# Patient Record
Sex: MALE | Race: WHITE | NOT HISPANIC OR LATINO
[De-identification: names, ages, dates, MRNs, and addresses within clinical notes are randomized per-mention and may not be internally consistent; named-entity substitution may affect disease eponyms.]

---

## 2021-07-30 ENCOUNTER — APPOINTMENT (OUTPATIENT)
Dept: OPHTHALMOLOGY | Facility: CLINIC | Age: 81
End: 2021-07-30
Payer: MEDICARE

## 2021-07-30 ENCOUNTER — NON-APPOINTMENT (OUTPATIENT)
Age: 81
End: 2021-07-30

## 2021-07-30 PROCEDURE — 92134 CPTRZ OPH DX IMG PST SGM RTA: CPT

## 2021-07-30 PROCEDURE — 92014 COMPRE OPH EXAM EST PT 1/>: CPT

## 2022-02-04 ENCOUNTER — APPOINTMENT (OUTPATIENT)
Dept: OPHTHALMOLOGY | Facility: CLINIC | Age: 82
End: 2022-02-04
Payer: MEDICARE

## 2022-02-04 ENCOUNTER — NON-APPOINTMENT (OUTPATIENT)
Age: 82
End: 2022-02-04

## 2022-02-04 PROCEDURE — 92014 COMPRE OPH EXAM EST PT 1/>: CPT

## 2022-02-04 PROCEDURE — 92133 CPTRZD OPH DX IMG PST SGM ON: CPT

## 2022-09-23 ENCOUNTER — APPOINTMENT (OUTPATIENT)
Dept: OPHTHALMOLOGY | Facility: CLINIC | Age: 82
End: 2022-09-23

## 2022-09-23 ENCOUNTER — NON-APPOINTMENT (OUTPATIENT)
Age: 82
End: 2022-09-23

## 2022-09-23 PROCEDURE — 92014 COMPRE OPH EXAM EST PT 1/>: CPT

## 2022-09-23 PROCEDURE — 92134 CPTRZ OPH DX IMG PST SGM RTA: CPT

## 2022-12-15 ENCOUNTER — TRANSCRIPTION ENCOUNTER (OUTPATIENT)
Age: 82
End: 2022-12-15

## 2022-12-15 PROBLEM — Z00.00 ENCOUNTER FOR PREVENTIVE HEALTH EXAMINATION: Status: ACTIVE | Noted: 2022-12-15

## 2022-12-19 ENCOUNTER — APPOINTMENT (OUTPATIENT)
Dept: CARE COORDINATION | Facility: HOME HEALTH | Age: 82
End: 2022-12-19
Payer: MEDICARE

## 2022-12-19 ENCOUNTER — TRANSCRIPTION ENCOUNTER (OUTPATIENT)
Age: 82
End: 2022-12-19

## 2022-12-19 DIAGNOSIS — I25.2 OLD MYOCARDIAL INFARCTION: ICD-10-CM

## 2022-12-19 DIAGNOSIS — Z95.5 PRESENCE OF CORONARY ANGIOPLASTY IMPLANT AND GRAFT: ICD-10-CM

## 2022-12-19 PROCEDURE — 99495 TRANSJ CARE MGMT MOD F2F 14D: CPT

## 2022-12-21 ENCOUNTER — NON-APPOINTMENT (OUTPATIENT)
Age: 82
End: 2022-12-21

## 2022-12-21 ENCOUNTER — APPOINTMENT (OUTPATIENT)
Dept: CARDIOLOGY | Facility: CLINIC | Age: 82
End: 2022-12-21

## 2022-12-21 VITALS
HEART RATE: 64 BPM | OXYGEN SATURATION: 99 % | SYSTOLIC BLOOD PRESSURE: 138 MMHG | DIASTOLIC BLOOD PRESSURE: 74 MMHG | RESPIRATION RATE: 15 BRPM

## 2022-12-21 VITALS
WEIGHT: 215 LBS | SYSTOLIC BLOOD PRESSURE: 118 MMHG | DIASTOLIC BLOOD PRESSURE: 66 MMHG | HEART RATE: 65 BPM | BODY MASS INDEX: 31.84 KG/M2 | HEIGHT: 69 IN | OXYGEN SATURATION: 100 %

## 2022-12-21 VITALS — DIASTOLIC BLOOD PRESSURE: 62 MMHG | SYSTOLIC BLOOD PRESSURE: 122 MMHG

## 2022-12-21 DIAGNOSIS — I10 ESSENTIAL (PRIMARY) HYPERTENSION: ICD-10-CM

## 2022-12-21 DIAGNOSIS — Z78.9 OTHER SPECIFIED HEALTH STATUS: ICD-10-CM

## 2022-12-21 DIAGNOSIS — Z85.828 PERSONAL HISTORY OF OTHER MALIGNANT NEOPLASM OF SKIN: ICD-10-CM

## 2022-12-21 DIAGNOSIS — Z79.899 OTHER LONG TERM (CURRENT) DRUG THERAPY: ICD-10-CM

## 2022-12-21 DIAGNOSIS — Z80.0 FAMILY HISTORY OF MALIGNANT NEOPLASM OF DIGESTIVE ORGANS: ICD-10-CM

## 2022-12-21 DIAGNOSIS — E78.00 PURE HYPERCHOLESTEROLEMIA, UNSPECIFIED: ICD-10-CM

## 2022-12-21 DIAGNOSIS — I25.10 ATHEROSCLEROTIC HEART DISEASE OF NATIVE CORONARY ARTERY W/OUT ANGINA PECTORIS: ICD-10-CM

## 2022-12-21 PROBLEM — Z95.5 H/O HEART ARTERY STENT: Status: ACTIVE | Noted: 2022-12-21

## 2022-12-21 PROBLEM — I25.2 H/O NON-ST ELEVATION MYOCARDIAL INFARCTION (NSTEMI): Status: RESOLVED | Noted: 2022-12-21 | Resolved: 2022-12-21

## 2022-12-21 PROCEDURE — 99204 OFFICE O/P NEW MOD 45 MIN: CPT

## 2022-12-21 PROCEDURE — 93000 ELECTROCARDIOGRAM COMPLETE: CPT

## 2022-12-21 RX ORDER — FAMOTIDINE 20 MG/1
20 TABLET, FILM COATED ORAL
Qty: 60 | Refills: 0 | Status: ACTIVE | COMMUNITY
Start: 2022-12-07

## 2022-12-21 RX ORDER — TAMSULOSIN HYDROCHLORIDE 0.4 MG/1
0.4 CAPSULE ORAL
Qty: 90 | Refills: 0 | Status: ACTIVE | COMMUNITY
Start: 2022-11-15

## 2022-12-21 RX ORDER — TICAGRELOR 90 MG/1
90 TABLET ORAL
Qty: 60 | Refills: 0 | Status: ACTIVE | COMMUNITY
Start: 2022-12-06

## 2022-12-21 RX ORDER — ATORVASTATIN CALCIUM 80 MG/1
80 TABLET, FILM COATED ORAL
Qty: 30 | Refills: 0 | Status: ACTIVE | COMMUNITY
Start: 2022-12-07

## 2022-12-21 RX ORDER — ASPIRIN 81 MG/1
81 TABLET, COATED ORAL
Qty: 30 | Refills: 0 | Status: ACTIVE | COMMUNITY
Start: 2022-12-07

## 2022-12-21 RX ORDER — AMLODIPINE BESYLATE 5 MG/1
5 TABLET ORAL
Qty: 60 | Refills: 0 | Status: ACTIVE | COMMUNITY
Start: 2022-12-07

## 2022-12-21 RX ORDER — OLMESARTAN MEDOXOMIL 20 MG/1
20 TABLET, FILM COATED ORAL
Qty: 90 | Refills: 0 | Status: ACTIVE | COMMUNITY
Start: 2022-11-15

## 2022-12-21 RX ORDER — METOPROLOL TARTRATE 25 MG/1
25 TABLET, FILM COATED ORAL
Qty: 60 | Refills: 0 | Status: ACTIVE | COMMUNITY
Start: 2022-12-07

## 2022-12-21 NOTE — ASSESSMENT
[FreeTextEntry1] : Byron Montemayor  is being seen after discharge home from [facility]. He was admitted on 12/5/22 for NSTEMI s/p stent placement and discharged home on 12/7/22.  Discharge medications were reviewed and reconciled with the current medication list and medications in home.\par \par 1)NSTEMI-s/p cardiac cath with stent\par Denies CP, SOB, on ASA, Brillinta\par HR controlled\par RRA access site without edema, no bleeding or hematoma\par \par Reminded of TCM program and encouraged pt to call with any questions or concerns and especially with worsening of symptoms. Verbalized understanding.\par

## 2022-12-21 NOTE — DISCUSSION/SUMMARY
[FreeTextEntry1] : 1. CAD: Patient had NSTEMI and was treated at [facility]. He underwent cardiac catheterization, 12/6/2022, and had PCI with 1 JIMMIE to his LAD. Continue DAPT with Aspirin 81mg daily and Brilinta 90mg BID for 1 year. Continue atorvastatin 80mg daily with goal LDL less than 70. Continue olmesartan 20mg daily and metoprolol 25mg (high risk medication with no signs of toxicity).\par \par 2. HTN: Goal BP less than 130/80. Recommend low salt diet. Continue olmesartan 20mg daily, amlodipine 5mg daily and metoprolol 25mg (high risk medication with no signs of toxicity).\par \par 3. HLD: goal LDL less than 70. Continue atorvastatin 80mg daily.\par \par Discussed cardiac rehab with patient. Discussed benefits.  He is opting against it.\par \par Follow up in 6 months.\par  [EKG obtained to assist in diagnosis and management of assessed problem(s)] : EKG obtained to assist in diagnosis and management of assessed problem(s)

## 2022-12-21 NOTE — HISTORY OF PRESENT ILLNESS
[Post-hospitalization from ___ Hospital] : Post-hospitalization from [unfilled] Hospital [Admitted on: ___] : The patient was admitted on [unfilled] [Discharged on ___] : discharged on [unfilled] [Discharge Summary] : discharge summary [Discharge Med List] : discharge medication list [Patient Contacted By: ____] : and contacted by [unfilled] [FreeTextEntry2] : Copied from EMR,"HOSPITAL COURSE: s/p Cardiac cath done via RRA\par s/p One JIMMIE to LAD\par DAPT\par No heavy lifting or pushing with procedure arm for 2 weeksNo driving for 2 days post procedureYou May [id number] hours after procedure but avoid bathing andswimming / submersion for one weekCheck wrist site for swelling bleeding or change in color / temperature If any changes or increase pain at site notify cardiologist immediately if condition declines call 911\par Do Not Stop taking Aspirin or Plavix/ Brilinta without speaking with your cardiologist\par Seek medicalttentionif:Bleeding,chest pain,dizziness,fainting,trine139.5,headache,shortness ofbreath,visionchanges increased wrist pain\par pt symptoms resolved, bp high- meds adjusted per cardio, gum bleeding stopped, pt given instructions reg tooth cleening. pt cleared by cardio for d/c home.\par pt electrolyte abnormalities corrected. chron medical problems under control, stable for d/c. Diet cardiac, regular, medications see discharge instructions, follow up pcp, cardiology, activity as tolerated. patient seen, examined on discharge day, stable for discharge home. pt understand and acknowledge and agree with discharge plan, medication regimen, and importance of f/ups after discharge."\par \par Pt seen in his home for transitional care management. He denies CP, SOB, no bleeding or edema from RRA.

## 2022-12-21 NOTE — PHYSICAL EXAM
[Normal] : moves all extremities, no focal deficits, normal speech [de-identified] : No carotid bruits auscultated bilaterally

## 2022-12-21 NOTE — CARDIOLOGY SUMMARY
[de-identified] : 12/21/2022, NSR with first degree AV Block. [de-identified] : 12/7/2022, LV EF 72%, trace--mild valvular disease.

## 2022-12-21 NOTE — PHYSICAL EXAM
[No Acute Distress] : no acute distress [Well Nourished] : well nourished [Well Developed] : well developed [Well-Appearing] : well-appearing [No Respiratory Distress] : no respiratory distress  [Clear to Auscultation] : lungs were clear to auscultation bilaterally [No Accessory Muscle Use] : no accessory muscle use [Normal Rate] : normal rate  [Regular Rhythm] : with a regular rhythm [Normal S1, S2] : normal S1 and S2 [No Murmur] : no murmur heard [Pedal Pulses Present] : the pedal pulses are present [Normal Gait] : normal gait [Coordination Grossly Intact] : coordination grossly intact [Speech Grossly Normal] : speech grossly normal [Normal Affect] : the affect was normal [Alert and Oriented x3] : oriented to person, place, and time [Normal Mood] : the mood was normal [Normal Insight/Judgement] : insight and judgment were intact [de-identified] : Trace pedal edema

## 2022-12-21 NOTE — HISTORY OF PRESENT ILLNESS
[FreeTextEntry1] : 82 year old male with PMhx of HTN, HLD presents to establish care with a cardiologist. Patient had NSTEMI and was treated at [facility]. He underwent cardiac catheterization, 12/6/2022, and had PCI with 1 JIMMIE to his LAD. \par \par Patient currently with no chest pain, dyspnea or palpitations.\par \par There is no history of CVA, CHF.\par

## 2023-01-05 ENCOUNTER — TRANSCRIPTION ENCOUNTER (OUTPATIENT)
Age: 83
End: 2023-01-05

## 2023-01-06 ENCOUNTER — TRANSCRIPTION ENCOUNTER (OUTPATIENT)
Age: 83
End: 2023-01-06

## 2023-03-24 ENCOUNTER — APPOINTMENT (OUTPATIENT)
Dept: OPHTHALMOLOGY | Facility: CLINIC | Age: 83
End: 2023-03-24

## 2023-04-28 ENCOUNTER — NON-APPOINTMENT (OUTPATIENT)
Age: 83
End: 2023-04-28

## 2023-04-28 ENCOUNTER — APPOINTMENT (OUTPATIENT)
Dept: OPHTHALMOLOGY | Facility: CLINIC | Age: 83
End: 2023-04-28
Payer: MEDICARE

## 2023-04-28 PROCEDURE — 92250 FUNDUS PHOTOGRAPHY W/I&R: CPT

## 2023-04-28 PROCEDURE — 92014 COMPRE OPH EXAM EST PT 1/>: CPT

## 2023-06-21 ENCOUNTER — APPOINTMENT (OUTPATIENT)
Dept: CARDIOLOGY | Facility: CLINIC | Age: 83
End: 2023-06-21

## 2023-12-08 ENCOUNTER — APPOINTMENT (OUTPATIENT)
Dept: OPHTHALMOLOGY | Facility: CLINIC | Age: 83
End: 2023-12-08
Payer: MEDICARE

## 2023-12-08 ENCOUNTER — NON-APPOINTMENT (OUTPATIENT)
Age: 83
End: 2023-12-08

## 2023-12-08 PROCEDURE — 92014 COMPRE OPH EXAM EST PT 1/>: CPT

## 2023-12-08 PROCEDURE — 92134 CPTRZ OPH DX IMG PST SGM RTA: CPT

## 2024-04-23 ENCOUNTER — INPATIENT (INPATIENT)
Facility: HOSPITAL | Age: 84
LOS: 7 days | Discharge: HOME | DRG: 66 | End: 2024-05-01
Attending: STUDENT IN AN ORGANIZED HEALTH CARE EDUCATION/TRAINING PROGRAM | Admitting: STUDENT IN AN ORGANIZED HEALTH CARE EDUCATION/TRAINING PROGRAM
Payer: MEDICARE

## 2024-04-23 VITALS
HEART RATE: 64 BPM | DIASTOLIC BLOOD PRESSURE: 60 MMHG | TEMPERATURE: 97 F | RESPIRATION RATE: 18 BRPM | SYSTOLIC BLOOD PRESSURE: 133 MMHG | WEIGHT: 226.64 LBS

## 2024-04-23 DIAGNOSIS — I60.9 NONTRAUMATIC SUBARACHNOID HEMORRHAGE, UNSPECIFIED: ICD-10-CM

## 2024-04-23 LAB
ABO RH CONFIRMATION: SIGNIFICANT CHANGE UP
ALBUMIN SERPL ELPH-MCNC: 2.6 G/DL — LOW (ref 3.3–5.2)
ALP SERPL-CCNC: 52 U/L — SIGNIFICANT CHANGE UP (ref 40–120)
ALT FLD-CCNC: 13 U/L — SIGNIFICANT CHANGE UP
AMPHET UR-MCNC: NEGATIVE — SIGNIFICANT CHANGE UP
ANION GAP SERPL CALC-SCNC: 10 MMOL/L — SIGNIFICANT CHANGE UP (ref 5–17)
ANION GAP SERPL CALC-SCNC: 9 MMOL/L — SIGNIFICANT CHANGE UP (ref 5–17)
APTT BLD: 26.3 SEC — SIGNIFICANT CHANGE UP (ref 24.5–35.6)
AST SERPL-CCNC: 22 U/L — SIGNIFICANT CHANGE UP
BARBITURATES UR SCN-MCNC: NEGATIVE — SIGNIFICANT CHANGE UP
BASOPHILS # BLD AUTO: 0.04 K/UL — SIGNIFICANT CHANGE UP (ref 0–0.2)
BASOPHILS NFR BLD AUTO: 0.5 % — SIGNIFICANT CHANGE UP (ref 0–2)
BENZODIAZ UR-MCNC: NEGATIVE — SIGNIFICANT CHANGE UP
BILIRUB DIRECT SERPL-MCNC: 0.2 MG/DL — SIGNIFICANT CHANGE UP (ref 0–0.3)
BILIRUB INDIRECT FLD-MCNC: 0.4 MG/DL — SIGNIFICANT CHANGE UP (ref 0.2–1)
BILIRUB SERPL-MCNC: 0.6 MG/DL — SIGNIFICANT CHANGE UP (ref 0.4–2)
BLD GP AB SCN SERPL QL: SIGNIFICANT CHANGE UP
BUN SERPL-MCNC: 22.8 MG/DL — HIGH (ref 8–20)
BUN SERPL-MCNC: 26.3 MG/DL — HIGH (ref 8–20)
CALCIUM SERPL-MCNC: 5.9 MG/DL — CRITICAL LOW (ref 8.4–10.5)
CALCIUM SERPL-MCNC: 7.9 MG/DL — LOW (ref 8.4–10.5)
CHLORIDE SERPL-SCNC: 107 MMOL/L — SIGNIFICANT CHANGE UP (ref 96–108)
CHLORIDE SERPL-SCNC: 116 MMOL/L — HIGH (ref 96–108)
CK MB CFR SERPL CALC: 4.7 NG/ML — SIGNIFICANT CHANGE UP (ref 0–6.7)
CK SERPL-CCNC: 185 U/L — SIGNIFICANT CHANGE UP (ref 30–200)
CO2 SERPL-SCNC: 17 MMOL/L — LOW (ref 22–29)
CO2 SERPL-SCNC: 22 MMOL/L — SIGNIFICANT CHANGE UP (ref 22–29)
COCAINE METAB.OTHER UR-MCNC: NEGATIVE — SIGNIFICANT CHANGE UP
CREAT SERPL-MCNC: 0.49 MG/DL — LOW (ref 0.5–1.3)
CREAT SERPL-MCNC: 0.81 MG/DL — SIGNIFICANT CHANGE UP (ref 0.5–1.3)
EGFR: 101 ML/MIN/1.73M2 — SIGNIFICANT CHANGE UP
EGFR: 87 ML/MIN/1.73M2 — SIGNIFICANT CHANGE UP
EOSINOPHIL # BLD AUTO: 0.01 K/UL — SIGNIFICANT CHANGE UP (ref 0–0.5)
EOSINOPHIL NFR BLD AUTO: 0.1 % — SIGNIFICANT CHANGE UP (ref 0–6)
ETHANOL SERPL-MCNC: <10 MG/DL — SIGNIFICANT CHANGE UP (ref 0–9)
GLUCOSE SERPL-MCNC: 79 MG/DL — SIGNIFICANT CHANGE UP (ref 70–99)
GLUCOSE SERPL-MCNC: 94 MG/DL — SIGNIFICANT CHANGE UP (ref 70–99)
HCT VFR BLD CALC: 34.8 % — LOW (ref 39–50)
HGB BLD-MCNC: 11.2 G/DL — LOW (ref 13–17)
IMM GRANULOCYTES NFR BLD AUTO: 0.2 % — SIGNIFICANT CHANGE UP (ref 0–0.9)
INR BLD: 1.13 RATIO — SIGNIFICANT CHANGE UP (ref 0.85–1.18)
LACTATE SERPL-SCNC: 1.3 MMOL/L — SIGNIFICANT CHANGE UP (ref 0.5–2)
LYMPHOCYTES # BLD AUTO: 0.74 K/UL — LOW (ref 1–3.3)
LYMPHOCYTES # BLD AUTO: 9.1 % — LOW (ref 13–44)
MAGNESIUM SERPL-MCNC: 1.8 MG/DL — SIGNIFICANT CHANGE UP (ref 1.6–2.6)
MAGNESIUM SERPL-MCNC: 2 MG/DL — SIGNIFICANT CHANGE UP (ref 1.8–2.6)
MCHC RBC-ENTMCNC: 28.4 PG — SIGNIFICANT CHANGE UP (ref 27–34)
MCHC RBC-ENTMCNC: 32.2 GM/DL — SIGNIFICANT CHANGE UP (ref 32–36)
MCV RBC AUTO: 88.1 FL — SIGNIFICANT CHANGE UP (ref 80–100)
METHADONE UR-MCNC: NEGATIVE — SIGNIFICANT CHANGE UP
MONOCYTES # BLD AUTO: 0.38 K/UL — SIGNIFICANT CHANGE UP (ref 0–0.9)
MONOCYTES NFR BLD AUTO: 4.7 % — SIGNIFICANT CHANGE UP (ref 2–14)
NEUTROPHILS # BLD AUTO: 6.97 K/UL — SIGNIFICANT CHANGE UP (ref 1.8–7.4)
NEUTROPHILS NFR BLD AUTO: 85.4 % — HIGH (ref 43–77)
OPIATES UR-MCNC: NEGATIVE — SIGNIFICANT CHANGE UP
PCP SPEC-MCNC: SIGNIFICANT CHANGE UP
PCP UR-MCNC: NEGATIVE — SIGNIFICANT CHANGE UP
PHOSPHATE SERPL-MCNC: 2.1 MG/DL — LOW (ref 2.4–4.7)
PHOSPHATE SERPL-MCNC: 2.1 MG/DL — LOW (ref 2.4–4.7)
PLATELET # BLD AUTO: 138 K/UL — LOW (ref 150–400)
PLATELET RESPONSE ASPIRIN RESULT: 411 ARU — SIGNIFICANT CHANGE UP
POTASSIUM SERPL-MCNC: 3.3 MMOL/L — LOW (ref 3.5–5.3)
POTASSIUM SERPL-MCNC: 4.2 MMOL/L — SIGNIFICANT CHANGE UP (ref 3.5–5.3)
POTASSIUM SERPL-SCNC: 3.3 MMOL/L — LOW (ref 3.5–5.3)
POTASSIUM SERPL-SCNC: 4.2 MMOL/L — SIGNIFICANT CHANGE UP (ref 3.5–5.3)
PROT SERPL-MCNC: 4.3 G/DL — LOW (ref 6.6–8.7)
PROTHROM AB SERPL-ACNC: 12.5 SEC — SIGNIFICANT CHANGE UP (ref 9.5–13)
RBC # BLD: 3.95 M/UL — LOW (ref 4.2–5.8)
RBC # FLD: 13.8 % — SIGNIFICANT CHANGE UP (ref 10.3–14.5)
SODIUM SERPL-SCNC: 139 MMOL/L — SIGNIFICANT CHANGE UP (ref 135–145)
SODIUM SERPL-SCNC: 142 MMOL/L — SIGNIFICANT CHANGE UP (ref 135–145)
THC UR QL: NEGATIVE — SIGNIFICANT CHANGE UP
TROPONIN T, HIGH SENSITIVITY RESULT: 25 NG/L — SIGNIFICANT CHANGE UP (ref 0–51)
WBC # BLD: 8.16 K/UL — SIGNIFICANT CHANGE UP (ref 3.8–10.5)
WBC # FLD AUTO: 8.16 K/UL — SIGNIFICANT CHANGE UP (ref 3.8–10.5)

## 2024-04-23 PROCEDURE — 70496 CT ANGIOGRAPHY HEAD: CPT | Mod: 26

## 2024-04-23 PROCEDURE — 93010 ELECTROCARDIOGRAM REPORT: CPT

## 2024-04-23 PROCEDURE — 70498 CT ANGIOGRAPHY NECK: CPT | Mod: 26

## 2024-04-23 PROCEDURE — 99285 EMERGENCY DEPT VISIT HI MDM: CPT | Mod: GC

## 2024-04-23 PROCEDURE — 99285 EMERGENCY DEPT VISIT HI MDM: CPT

## 2024-04-23 PROCEDURE — 70450 CT HEAD/BRAIN W/O DYE: CPT | Mod: 26,59

## 2024-04-23 RX ORDER — INFLUENZA VIRUS VACCINE 15; 15; 15; 15 UG/.5ML; UG/.5ML; UG/.5ML; UG/.5ML
0.7 SUSPENSION INTRAMUSCULAR ONCE
Refills: 0 | Status: COMPLETED | OUTPATIENT
Start: 2024-04-23 | End: 2024-04-23

## 2024-04-23 RX ORDER — MAGNESIUM SULFATE 500 MG/ML
2 VIAL (ML) INJECTION ONCE
Refills: 0 | Status: COMPLETED | OUTPATIENT
Start: 2024-04-23 | End: 2024-04-23

## 2024-04-23 RX ORDER — ACETAMINOPHEN 500 MG
1000 TABLET ORAL EVERY 6 HOURS
Refills: 0 | Status: DISCONTINUED | OUTPATIENT
Start: 2024-04-23 | End: 2024-04-30

## 2024-04-23 RX ORDER — TAMSULOSIN HYDROCHLORIDE 0.4 MG/1
0.4 CAPSULE ORAL AT BEDTIME
Refills: 0 | Status: DISCONTINUED | OUTPATIENT
Start: 2024-04-23 | End: 2024-05-01

## 2024-04-23 RX ORDER — ATORVASTATIN CALCIUM 80 MG/1
40 TABLET, FILM COATED ORAL AT BEDTIME
Refills: 0 | Status: DISCONTINUED | OUTPATIENT
Start: 2024-04-23 | End: 2024-05-01

## 2024-04-23 RX ORDER — CHLORHEXIDINE GLUCONATE 213 G/1000ML
1 SOLUTION TOPICAL DAILY
Refills: 0 | Status: DISCONTINUED | OUTPATIENT
Start: 2024-04-23 | End: 2024-05-01

## 2024-04-23 RX ORDER — FAMOTIDINE 10 MG/ML
1 INJECTION INTRAVENOUS
Refills: 0 | DISCHARGE

## 2024-04-23 RX ORDER — FAMOTIDINE 10 MG/ML
20 INJECTION INTRAVENOUS DAILY
Refills: 0 | Status: DISCONTINUED | OUTPATIENT
Start: 2024-04-23 | End: 2024-05-01

## 2024-04-23 RX ORDER — EZETIMIBE 10 MG/1
1 TABLET ORAL
Refills: 0 | DISCHARGE

## 2024-04-23 RX ORDER — ATORVASTATIN CALCIUM 80 MG/1
1 TABLET, FILM COATED ORAL
Refills: 0 | DISCHARGE

## 2024-04-23 RX ORDER — SODIUM CHLORIDE 9 MG/ML
1000 INJECTION INTRAMUSCULAR; INTRAVENOUS; SUBCUTANEOUS
Refills: 0 | Status: DISCONTINUED | OUTPATIENT
Start: 2024-04-23 | End: 2024-04-24

## 2024-04-23 RX ORDER — TAMSULOSIN HYDROCHLORIDE 0.4 MG/1
1 CAPSULE ORAL
Refills: 0 | DISCHARGE

## 2024-04-23 RX ADMIN — Medication 85 MILLIMOLE(S): at 21:36

## 2024-04-23 RX ADMIN — SODIUM CHLORIDE 100 MILLILITER(S): 9 INJECTION INTRAMUSCULAR; INTRAVENOUS; SUBCUTANEOUS at 16:00

## 2024-04-23 RX ADMIN — Medication 25 GRAM(S): at 21:36

## 2024-04-23 RX ADMIN — TAMSULOSIN HYDROCHLORIDE 0.4 MILLIGRAM(S): 0.4 CAPSULE ORAL at 21:33

## 2024-04-23 RX ADMIN — ATORVASTATIN CALCIUM 40 MILLIGRAM(S): 80 TABLET, FILM COATED ORAL at 21:33

## 2024-04-23 NOTE — H&P ADULT - ATTENDING COMMENTS
Patient w/ ?syncopal falls, most recent fall w/ workup revealing ICH.   GCS 15 on assessment in trauma bay.   --To SICU for neuro monitoring.   --f/u 6hr repeat CT head; to undergo CT A head/neck at 6hr scan as well.   --Hold AC

## 2024-04-23 NOTE — PHARMACOTHERAPY INTERVENTION NOTE - COMMENTS
Referenced outpatient fill record to obtain medication list. Of note, patient reports non-compliance and has not taken his recently prescribed eliquis.    Outpatient Medication Review updated.    HOME MEDICATIONS:  apixaban 5 mg oral tablet: 1 tab(s) orally 2 times a day (23 Apr 2024 13:48)  atorvastatin 40 mg oral tablet: 1 tab(s) orally once a day (23 Apr 2024 13:48)  ezetimibe 10 mg oral tablet: 1 tab(s) orally once a day (23 Apr 2024 13:49)  famotidine 20 mg oral tablet: 1 tab(s) orally 2 times a day (23 Apr 2024 13:49)  metoprolol succinate 25 mg oral tablet, extended release: 1 tab(s) orally once a day (23 Apr 2024 13:49)  olmesartan 20 mg oral tablet: 1 tab(s) orally once a day (23 Apr 2024 13:49)  tamsulosin 0.4 mg oral capsule: 1 cap(s) orally once a day (23 Apr 2024 13:49)   Referenced outpatient fill record to obtain medication list. Of note, patient reports he was recently prescribed apixiban but has not taken it. He stated his PCP wanted to re-evaluate need for it on next visit.    Outpatient Medication Review updated.    HOME MEDICATIONS:  apixaban 5 mg oral tablet: 1 tab(s) orally 2 times a day (23 Apr 2024 13:48)-needs follow-up  atorvastatin 40 mg oral tablet: 1 tab(s) orally once a day (23 Apr 2024 13:48)  ezetimibe 10 mg oral tablet: 1 tab(s) orally once a day (23 Apr 2024 13:49)  famotidine 20 mg oral tablet: 1 tab(s) orally 2 times a day (23 Apr 2024 13:49)  metoprolol succinate 25 mg oral tablet, extended release: 1 tab(s) orally once a day (23 Apr 2024 13:49)  olmesartan 20 mg oral tablet: 1 tab(s) orally once a day (23 Apr 2024 13:49)  tamsulosin 0.4 mg oral capsule: 1 cap(s) orally once a day (23 Apr 2024 13:49)   Referenced outpatient fill record to obtain medication list. Of note, patient reports he was recently prescribed apixiban but has not taken it. He stated his PCP wanted to re-evaluate need for it on next visit.      Outpatient Medication Review updated.    HOME MEDICATIONS:  apixaban 5 mg oral tablet: 1 tab(s) orally 2 times a day (23 Apr 2024 14:01)  aspirin 81 mg oral delayed release tablet: 1 tab(s) orally once a day (23 Apr 2024 14:06)  atorvastatin 40 mg oral tablet: 1 tab(s) orally once a day (23 Apr 2024 13:48)  ezetimibe 10 mg oral tablet: 1 tab(s) orally once a day (23 Apr 2024 13:49)  famotidine 20 mg oral tablet: 1 tab(s) orally 2 times a day (23 Apr 2024 13:49)  metoprolol succinate 25 mg oral tablet, extended release: 1 tab(s) orally once a day (23 Apr 2024 14:01)  olmesartan 20 mg oral tablet: 1 tab(s) orally once a day (23 Apr 2024 14:01)  tamsulosin 0.4 mg oral capsule: 1 cap(s) orally once a day (23 Apr 2024 14:01)

## 2024-04-23 NOTE — ED ADULT NURSE NOTE - OBJECTIVE STATEMENT
A&OX4, RR even and unlabored on RA. Skin is warm a d dry. Bruising noted to the right flank that goes up the back. PT S/ unwitnessed mechanical fall. On Aspirin. Transfer for +brain bleed. Neuro intact. Offering no compilations at this time. Admitted to SICU. NEURO and SICU team at bedside.

## 2024-04-23 NOTE — ED ADULT TRIAGE NOTE - CHIEF COMPLAINT QUOTE
transfer [place] [facility] [place]. Presents to ed s/p unwitnessed fall questionable on adherence to taking his eliquis. CT @ [facility] showed "L sided subarachnoid hemorrhage" transfer from [facility] [place], trauma B activated. Presents to ed s/p unwitnessed fall questionable on adherence to taking his eliquis. CT @ [facility] showed "L sided subarachnoid hemorrhage"

## 2024-04-23 NOTE — ED ADULT NURSE NOTE - NSFALLHARMRISKINTERV_ED_ALL_ED
Assistance OOB with selected safe patient handling equipment if applicable/Assistance with ambulation/Communicate risk of Fall with Harm to all staff, patient, and family/Monitor gait and stability/Provide visual cue: red socks, yellow wristband, yellow gown, etc/Reinforce activity limits and safety measures with patient and family/Bed in lowest position, wheels locked, appropriate side rails in place/Call bell, personal items and telephone in reach/Instruct patient to call for assistance before getting out of bed/chair/stretcher/Non-slip footwear applied when patient is off stretcher/[place] to call system/Physically safe environment - no spills, clutter or unnecessary equipment/Purposeful Proactive Rounding/Room/bathroom lighting operational, light cord in reach

## 2024-04-23 NOTE — ED PROVIDER NOTE - PHYSICAL EXAMINATION
Gen: Well appearing in NAD  Head: NC +ABRASION TO PARIETAL SCALP  Neck: trachea midline  Resp:  No distress  Abd: soft, nd, nt  Ext: no deformities  MSK: +LOW BACK TTP  Neuro:  A&O appears non focal  Skin:  Warm and dry as visualized +ECCHYMOSIS TO LOW BACK  Psych:  Normal affect and mood Gen: Well appearing in NAD  Head: NC +ABRASION TO PARIETAL SCALP  Neck: trachea midline  Resp:  No distress, saturating well on RA, Breath sounds present and equal b/l  CV: /60, central pulses in tact  Abd: soft, nd, nt  Ext: no deformities  MSK: +LOW BACK TTP  Neuro:  A&O appears non focal  Skin:  Warm and dry as visualized +ECCHYMOSIS TO LOW BACK  Psych:  Normal affect and mood

## 2024-04-23 NOTE — ED ADULT NURSE NOTE - CHIEF COMPLAINT QUOTE
[place] [facility] [place] activated. Presents to ed s/p unwitnessed fall questionable on adherence to taking his eliquis. CT @ [facility] showed "L sided subarachnoid hemorrhage"

## 2024-04-23 NOTE — GOALS OF CARE CONVERSATION - ADVANCED CARE PLANNING - CONVERSATION DETAILS
Byron Montemayor is a 84y M, PMHx CAD on ASA, s/p fall and admitted to the SICU with SAH.     I spoke with Byron and his wife Sonja ([phone]) at bedside, who was identified as the health care proxy, and we had an extensive conversation about Byron's care and current condition.     Discussed overall goals of care including advanced directives; during this discussion we reviewed the current diagnosis, treatment plan, and likely prognosis. An explanation of advanced directives and MOLST was provided.    I obtained consent to discuss what his wishes would be if his conditioned worsened. Explained that if his brain bleed were to worsen, his breathing could slow down, requiring intubation and mechanical ventilation, or his heart could stop, requiring chest compressions and electricity to bring him back.     After this conversation decision was made by Byron and his wife to continue full code status.

## 2024-04-23 NOTE — H&P ADULT - ASSESSMENT
83 yo male with a pmhx of a cardiac stent (No on AC), A. Flutter (recently diagnosed), HTN, HLD, GERD, who presents to the ED as as transfer from [facility] [place] after having a fall at home with a SAH.     - Admit to SICU   - Med rec   - Appreciate nsx med recs  - Follow up repeat scans 6 hours later from [facility] [place] at 5:30pm   - Pain control   - Rest of care per primary team

## 2024-04-23 NOTE — PATIENT PROFILE ADULT - FALL HARM RISK - HARM RISK INTERVENTIONS

## 2024-04-23 NOTE — ED PROVIDER NOTE - ATTENDING CONTRIBUTION TO CARE
I have seen and examined this patient and fully participated in the care of this patient as the teaching attending. I personally made/approved the management plan and take responsibility for the patient management.     I have reviewed the resident's documentation. The documentation has been edited as indicated to reflect my findings. Dolores Lewis MD, Attending Physician

## 2024-04-23 NOTE — H&P ADULT - HISTORY OF PRESENT ILLNESS
Pt is 84 year old male with a PMH gerd, HTN, HLD, newly diagnosed a flutter, and cardiac stent (not on blood thinners) transferred from [facility] after a fall from standing height. [place] [facility] [place] the pt was triaged and scans performed at 11:30am were significant for a SAH. When the pt was tx to [facility], the pt was seen and examined in the trauma bay. The pt was A&Ox3, speaking in full sentences, and had a GCS of 15. The pt stated that he was walking and "doesn't know what happened" but fell with (+) head strike, and that he recently fell prior on April 19th. Upon examination, the pt had a right parietal abrasion, ecchymosis on the right hip, and stated that he was seeing double. The pt denied headaches and stated that he currently does not take any blood thinners, despite being prescribed them. Recently he stated that his cardiologist started him on a new medication which he said "could be the reason I fell".

## 2024-04-23 NOTE — ED PROVIDER NOTE - OBJECTIVE STATEMENT
KALINA CHRISTENSEN is a 83yo Male with PMH HTN, a flutter on Eliquis who presents from [facility] [place] where he was found to have SAH. Pt reports two episodes of syncope in the last week w/ associated falls. PT denies any symptoms of cp/sob, fevers, chills, nausea, vomiting, abdominal pain, urinary symptoms, weakness, confusion. KALINA CHRISTENSEN is a 85yo Male with PMH HTN, a flutter on Eliquis which he has not taken recently who [place] [facility] [place] SAH. Pt reports two episodes of syncope in the last week w/ associated falls. PT denies any symptoms of cp/sob, fevers, chills, nausea, vomiting, abdominal pain, urinary symptoms, weakness, confusion.

## 2024-04-23 NOTE — ED ADULT NURSE REASSESSMENT NOTE - NS ED NURSE REASSESS COMMENT FT1
Pt awake and alert, able to move all extremities with no drifts, no facial droop, sensory intact, respirations even and unlabored on RA, skin warm and dry, NSR on CM. Pt denies HA, dizziness, SOB, N/V/D, CP, palpitations, fevers, chills. Pt endorses double vision which he stated has been present since arrival, and has not gotten better or worse. Pt placed on transport monitor for transport to [id number]

## 2024-04-23 NOTE — ED PROVIDER NOTE - CLINICAL SUMMARY MEDICAL DECISION MAKING FREE TEXT BOX
ASSESSMENT:   KALINA CHRISTENSEN is a 83yo M who presented as transfer from [facility] [place] w/ SAH. Vitals stable.     PLAN: Admit SICU ASSESSMENT:   KALINA CHRISTENSEN is a 83yo M who presented as transfer from [facility] [place] w/ SAH. Vitals stable.     PLAN: Admit SICU. SICU team to follow up repeat imaging

## 2024-04-23 NOTE — CONSULT NOTE ADULT - SUBJECTIVE AND OBJECTIVE BOX
Patient is a 84y old  Male who presents with a chief complaint of fall     HISTORY OF PRESENT ILLNESS:   83 yo male with a pmhx of a cardiac stent (No on AC), A. Flutter (recently diagnosed), HTN, HLD, GERD, who presents to the ED as as [place] [facility] [place] having a fall at home. Patient states that he was going to get some coffee and fell. Patient denies any symptoms prior to the fall. He denies any headaches or new weakness in his extremities at this time. (+) Head strije, (-) LOC    Allergies  Allergy Status Unknown    REVIEW OF SYSTEMS  Negative except as noted in HPI    Vital Signs Last 24 Hrs  T(C): 36.1 (23 Apr 2024 13:32), Max: 36.1 (23 Apr 2024 13:32)  T(F): 97 (23 Apr 2024 13:32), Max: 97 (23 Apr 2024 13:32)  HR: 64 (23 Apr 2024 13:32) (64 - 64)  BP: 133/60 (23 Apr 2024 13:32) (133/60 - 133/60)  BP(mean): --  RR: 18 (23 Apr 2024 13:32) (18 - 18)  SpO2: --    Parameters below as of 23 Apr 2024 13:32  Patient On (Oxygen Delivery Method): room air    PHYSICAL EXAM:  GENERAL: NAD  HEAD:  (+) Abrasion to the medial temporal area of his scalp  EYES: Pupils 3mmR  NECK: Supple, No tenderness to palpation  DNENYS COMA SCORE: E- V- M- = 15  MENTAL STATUS: AAO x3; Awake, Opens eyes spontaneously, Appropriately conversant without aphasia, following simple commands  CRANIAL NERVES: PERRL. EOMI without nystagmus. Facial sensation intact V1-3 distribution b/l. Face symmetric w/ normal eye closure and smile, tongue midline. Hearing grossly intact. Speech clear  REFLEXES: PERRL. Corneals intact b/l. Gag intact. Cough intact. Oculocephalic reflex intact (Doll's eye). Negative Goetz's b/l. Negative clonus b/l  MOTOR: strength 5/5 b/l upper and lower extremities  SENSATION: grossly intact to light touch all extremities  SKIN: Warm, dry; no rashes or lesions    LABS:                CULTURES:      RADIOLOGY & ADDITIONAL STUDIES:      CAPRINI SCORE [CLOT]:  Patient has an estimated Caprini score of greater than 5.  However, the patient's unique clinical situation will be addressed in an individual manner to determine appropriate anticoagulation treatment, if any. Patient is a 84y old  Male who presents with a chief complaint of fall     HISTORY OF PRESENT ILLNESS:   85 yo male with a pmhx of a cardiac stent (No on AC), A. Flutter (recently diagnosed), HTN, HLD, GERD, who [place] ED [place] [facility] [place] at [place]. Patient states that he was going to get some coffee and fell. Patient denies any symptoms prior to the fall. He denies any headaches or new weakness in his extremities at this time. (+) Head strike (-) LOC    Allergies  Allergy Status Unknown    REVIEW OF SYSTEMS  Negative except as noted in HPI    Vital Signs Last 24 Hrs  T(C): 36.1 (23 Apr 2024 13:32), Max: 36.1 (23 Apr 2024 13:32)  T(F): 97 (23 Apr 2024 13:32), Max: 97 (23 Apr 2024 13:32)  HR: 64 (23 Apr 2024 13:32) (64 - 64)  BP: 133/60 (23 Apr 2024 13:32) (133/60 - 133/60)  BP(mean): --  RR: 18 (23 Apr 2024 13:32) (18 - 18)  SpO2: --    Parameters below as of 23 Apr 2024 13:32  Patient On (Oxygen Delivery Method): room air    PHYSICAL EXAM:  GENERAL: NAD  HEAD:  (+) Abrasion to the medial temporal area of his scalp  EYES: Pupils 3mmR  NECK: Supple, No tenderness to palpation  DENNYS COMA SCORE: E- V- M- = 15  MENTAL STATUS: AAO x3; Awake, Opens eyes spontaneously, Appropriately conversant without aphasia, following simple commands  CRANIAL NERVES: PERRL. EOMI without nystagmus. Facial sensation intact V1-3 distribution b/l. Face symmetric w/ normal eye closure and smile, tongue midline. Hearing grossly intact. Speech clear  REFLEXES: PERRLA  MOTOR: strength 5/5 b/l upper and lower extremities  SENSATION: grossly intact to light touch all extremities  SKIN: Warm, dry; no rashes or lesions    LABS:  Prothrombin Time and INR, Plasma (04.23.24 @ 13:34)   Prothrombin Time, Plasma: 12.5 sec  INR: 1.13: Recommended targets/ranges for therapeutic INR:   2.0-3.0 Deep vein thrombosis, pulmonary embolism, atrial fibrillation   2.0-3.0 Mechanical aortic valve, antiphospholipid syndrome with previous   arterial or venous thromboembolism   2.5-3.5 Mechanical mitral valve, double mechanical valve (aortic and   mitral positions, high risk valves)   Note: Chest 2012 Feb;141(2 Suppl):7S-47S   Routine coagulation results should be interpreted with caution when   taking Factor Xa inhibitors or direct thrombin inhibitors; blood sampling   prior to drug intake is recommended. ratioComplete Blood Count + Automated Diff (04.23.24 @ 13:34)   WBC Count: 8.16 K/uL  RBC Count: 3.95 M/uL  Hemoglobin: 11.2 g/dL  Hematocrit: 34.8 %  Mean Cell Volume: 88.1 fl  Mean Cell Hemoglobin: 28.4 pg  Mean Cell Hemoglobin Conc: 32.2 gm/dL  Red Cell Distrib Width: 13.8 %  Platelet Count - Automated: 138 K/uL  Auto Neutrophil #: 6.97 K/uL  Auto Lymphocyte #: 0.74 K/uL  Auto Monocyte #: 0.38 K/uL  Auto Eosinophil #: 0.01 K/uL  Auto Basophil #: 0.04 K/uL  Auto Neutrophil %: 85.4: Differential percentages must be correlated with absolute numbers for   clinical significance. %  Auto Lymphocyte %: 9.1 %  Auto Monocyte %: 4.7 %  Auto Eosinophil %: 0.1 %  Auto Basophil %: 0.5 %  Auto Immature Granulocyte %: 0.2: (Includes meta, myelo and promyelocytes). Mild elevations in immature   granulocytes may be seen with many inflammatory processes and pregnancy;   clinical correlation suggested. %Alcohol, Blood (04.23.24 @ 13:34)   Alcohol, Blood: <10: TOXIC CONCENTRATIONS (mg/dL):   Flushing, Slowing of Reflexes, Impaired Visual Acuity:    Depression of CNS:   > 100   Fatalities Reported:   > 400   Results reported as a "< number" are below reliably detectable limits and   considered negative   These ranges are intended as general guidelines.   Alcohol metabolism can vary widely among individuals.   This test is approved for clinical and not for forensic purposes. mg/dL  RADIOLOGY & ADDITIONAL STUDIES:  [facility] CTH @ 11:34am: left frontal TSAH, Left ambient/quadrigeminal plate cistern    CAPRINI SCORE [CLOT]:  Patient has an estimated Caprini score of greater than 5.  However, the patient's unique clinical situation will be addressed in an individual manner to determine appropriate anticoagulation treatment, if any.

## 2024-04-24 ENCOUNTER — RESULT REVIEW (OUTPATIENT)
Age: 84
End: 2024-04-24

## 2024-04-24 DIAGNOSIS — I48.92 UNSPECIFIED ATRIAL FLUTTER: ICD-10-CM

## 2024-04-24 DIAGNOSIS — R55 SYNCOPE AND COLLAPSE: ICD-10-CM

## 2024-04-24 DIAGNOSIS — I25.10 ATHEROSCLEROTIC HEART DISEASE OF NATIVE CORONARY ARTERY WITHOUT ANGINA PECTORIS: ICD-10-CM

## 2024-04-24 LAB
A1C WITH ESTIMATED AVERAGE GLUCOSE RESULT: 5.3 % — SIGNIFICANT CHANGE UP (ref 4–5.6)
ANION GAP SERPL CALC-SCNC: 10 MMOL/L — SIGNIFICANT CHANGE UP (ref 5–17)
BASOPHILS # BLD AUTO: 0.03 K/UL — SIGNIFICANT CHANGE UP (ref 0–0.2)
BASOPHILS NFR BLD AUTO: 0.5 % — SIGNIFICANT CHANGE UP (ref 0–2)
BUN SERPL-MCNC: 21.7 MG/DL — HIGH (ref 8–20)
CALCIUM SERPL-MCNC: 7.1 MG/DL — LOW (ref 8.4–10.5)
CHLORIDE SERPL-SCNC: 107 MMOL/L — SIGNIFICANT CHANGE UP (ref 96–108)
CO2 SERPL-SCNC: 20 MMOL/L — LOW (ref 22–29)
CREAT SERPL-MCNC: 0.69 MG/DL — SIGNIFICANT CHANGE UP (ref 0.5–1.3)
EGFR: 91 ML/MIN/1.73M2 — SIGNIFICANT CHANGE UP
EOSINOPHIL # BLD AUTO: 0.05 K/UL — SIGNIFICANT CHANGE UP (ref 0–0.5)
EOSINOPHIL NFR BLD AUTO: 0.8 % — SIGNIFICANT CHANGE UP (ref 0–6)
ESTIMATED AVERAGE GLUCOSE: 105 MG/DL — SIGNIFICANT CHANGE UP (ref 68–114)
FOLATE SERPL-MCNC: >20 NG/ML — SIGNIFICANT CHANGE UP
GLUCOSE SERPL-MCNC: 104 MG/DL — HIGH (ref 70–99)
HCT VFR BLD CALC: 32.3 % — LOW (ref 39–50)
HGB BLD-MCNC: 10.4 G/DL — LOW (ref 13–17)
IMM GRANULOCYTES NFR BLD AUTO: 0.2 % — SIGNIFICANT CHANGE UP (ref 0–0.9)
INR BLD: 1.24 RATIO — HIGH (ref 0.85–1.18)
LYMPHOCYTES # BLD AUTO: 1.33 K/UL — SIGNIFICANT CHANGE UP (ref 1–3.3)
LYMPHOCYTES # BLD AUTO: 20.3 % — SIGNIFICANT CHANGE UP (ref 13–44)
MAGNESIUM SERPL-MCNC: 2.1 MG/DL — SIGNIFICANT CHANGE UP (ref 1.6–2.6)
MCHC RBC-ENTMCNC: 28 PG — SIGNIFICANT CHANGE UP (ref 27–34)
MCHC RBC-ENTMCNC: 32.2 GM/DL — SIGNIFICANT CHANGE UP (ref 32–36)
MCV RBC AUTO: 87.1 FL — SIGNIFICANT CHANGE UP (ref 80–100)
MONOCYTES # BLD AUTO: 0.57 K/UL — SIGNIFICANT CHANGE UP (ref 0–0.9)
MONOCYTES NFR BLD AUTO: 8.7 % — SIGNIFICANT CHANGE UP (ref 2–14)
MRSA PCR RESULT.: SIGNIFICANT CHANGE UP
NEUTROPHILS # BLD AUTO: 4.56 K/UL — SIGNIFICANT CHANGE UP (ref 1.8–7.4)
NEUTROPHILS NFR BLD AUTO: 69.5 % — SIGNIFICANT CHANGE UP (ref 43–77)
PHOSPHATE SERPL-MCNC: 3.6 MG/DL — SIGNIFICANT CHANGE UP (ref 2.4–4.7)
PLATELET # BLD AUTO: 135 K/UL — LOW (ref 150–400)
POTASSIUM SERPL-MCNC: 4.1 MMOL/L — SIGNIFICANT CHANGE UP (ref 3.5–5.3)
POTASSIUM SERPL-SCNC: 4.1 MMOL/L — SIGNIFICANT CHANGE UP (ref 3.5–5.3)
PROTHROM AB SERPL-ACNC: 13.7 SEC — HIGH (ref 9.5–13)
RBC # BLD: 3.71 M/UL — LOW (ref 4.2–5.8)
RBC # FLD: 13.9 % — SIGNIFICANT CHANGE UP (ref 10.3–14.5)
S AUREUS DNA NOSE QL NAA+PROBE: SIGNIFICANT CHANGE UP
SODIUM SERPL-SCNC: 137 MMOL/L — SIGNIFICANT CHANGE UP (ref 135–145)
TROPONIN T, HIGH SENSITIVITY RESULT: 33 NG/L — SIGNIFICANT CHANGE UP (ref 0–51)
TSH SERPL-MCNC: 0.48 UIU/ML — SIGNIFICANT CHANGE UP (ref 0.27–4.2)
VIT B12 SERPL-MCNC: 1166 PG/ML — SIGNIFICANT CHANGE UP (ref 232–1245)
WBC # BLD: 6.55 K/UL — SIGNIFICANT CHANGE UP (ref 3.8–10.5)
WBC # FLD AUTO: 6.55 K/UL — SIGNIFICANT CHANGE UP (ref 3.8–10.5)

## 2024-04-24 PROCEDURE — 99291 CRITICAL CARE FIRST HOUR: CPT

## 2024-04-24 PROCEDURE — 93010 ELECTROCARDIOGRAM REPORT: CPT

## 2024-04-24 PROCEDURE — 99223 1ST HOSP IP/OBS HIGH 75: CPT

## 2024-04-24 PROCEDURE — 70450 CT HEAD/BRAIN W/O DYE: CPT | Mod: 26

## 2024-04-24 PROCEDURE — 99233 SBSQ HOSP IP/OBS HIGH 50: CPT

## 2024-04-24 PROCEDURE — 99222 1ST HOSP IP/OBS MODERATE 55: CPT

## 2024-04-24 PROCEDURE — 93306 TTE W/DOPPLER COMPLETE: CPT | Mod: 26

## 2024-04-24 RX ORDER — HYDRALAZINE HCL 50 MG
10 TABLET ORAL
Refills: 0 | Status: DISCONTINUED | OUTPATIENT
Start: 2024-04-24 | End: 2024-05-01

## 2024-04-24 RX ORDER — POLYETHYLENE GLYCOL 3350 17 G/17G
17 POWDER, FOR SOLUTION ORAL DAILY
Refills: 0 | Status: DISCONTINUED | OUTPATIENT
Start: 2024-04-24 | End: 2024-05-01

## 2024-04-24 RX ORDER — SODIUM CHLORIDE 9 MG/ML
1000 INJECTION INTRAMUSCULAR; INTRAVENOUS; SUBCUTANEOUS
Refills: 0 | Status: DISCONTINUED | OUTPATIENT
Start: 2024-04-25 | End: 2024-04-25

## 2024-04-24 RX ORDER — SODIUM CHLORIDE 9 MG/ML
1000 INJECTION, SOLUTION INTRAVENOUS
Refills: 0 | Status: DISCONTINUED | OUTPATIENT
Start: 2024-04-24 | End: 2024-04-24

## 2024-04-24 RX ORDER — SENNA PLUS 8.6 MG/1
2 TABLET ORAL AT BEDTIME
Refills: 0 | Status: DISCONTINUED | OUTPATIENT
Start: 2024-04-24 | End: 2024-05-01

## 2024-04-24 RX ORDER — LABETALOL HCL 100 MG
10 TABLET ORAL
Refills: 0 | Status: DISCONTINUED | OUTPATIENT
Start: 2024-04-24 | End: 2024-04-26

## 2024-04-24 RX ADMIN — FAMOTIDINE 20 MILLIGRAM(S): 10 INJECTION INTRAVENOUS at 12:10

## 2024-04-24 RX ADMIN — POLYETHYLENE GLYCOL 3350 17 GRAM(S): 17 POWDER, FOR SOLUTION ORAL at 18:22

## 2024-04-24 RX ADMIN — CHLORHEXIDINE GLUCONATE 1 APPLICATION(S): 213 SOLUTION TOPICAL at 12:11

## 2024-04-24 RX ADMIN — ATORVASTATIN CALCIUM 40 MILLIGRAM(S): 80 TABLET, FILM COATED ORAL at 21:47

## 2024-04-24 RX ADMIN — TAMSULOSIN HYDROCHLORIDE 0.4 MILLIGRAM(S): 0.4 CAPSULE ORAL at 21:47

## 2024-04-24 NOTE — PHYSICAL THERAPY INITIAL EVALUATION ADULT - GENERAL OBSERVATIONS, REHAB EVAL
Pt received in chair + IV loc, tele//BP monitoring, breathing on RA in NAD, in 0/10 pain, agreeable to PT evaluation

## 2024-04-24 NOTE — CONSULT NOTE ADULT - PROBLEM SELECTOR RECOMMENDATION 9
.  - pt states newly diagnosed, denies taking AC at home  - Aflutter variable rates, ranging 2:1 to 7:1  - Majority of pauses less then 3 seconds, longest 4.5 seconds   - avoid AV mariela blockers  - CHADSVASC (age, HTN, MI) 4   - Admitted for fall, sub arachnoid  - not a candidate for AC at this time  - EP consulted to evaluate for ILR vs PPM, also would benefit from eventual watchman evaluation

## 2024-04-24 NOTE — CONSULT NOTE ADULT - ASSESSMENT
84 year old male with a PMH GERD, HTN, HLD, recently diagnosed A flutter (never started Eliquis due to fear of side-effects), and cardiac stent 1.5 years ago, was transferred from [facility] after a fall from standing height. [place] [facility] and scans performed at 11:30am were significant for a SAH. When the pt was tx to [facility], the pt was seen and examined in the trauma bay. The pt was A&Ox3, speaking in full sentences, and had a GCS of 15. The pt stated that he was walking and "doesn't know what happened" but fell with (+) head strike, and that he recently fell prior on April 19th. Upon examination, the pt had a right parietal abrasion, ecchymosis on the right hip, and stated that he was seeing double.   [facility] consulted for comanagement.     # Traumatic SAH  - management per neurosurgery and trauma service  - Suggest syncope work up:       Monitor on telemetry (ensure there are no bradycardic events, or other significant arrhythmias)       TTE pending       Obtain Orthostatic vital signs  - PT evaluation    # Atrial flutter  - currently off metoprolol ER 25mg daily with Aflutter in 60s on telemetry.   - Would obtain cardiology consult, evaluate need for PPM  - monitor vital signs    # Normocytic anemia  - Hg 10.4 appears stable  - Check CBC in AM  - TSH, B12 and folate WNL  - Suggest outpatient follow up with PCP    # BPH  - continue flomax for now (can result in orthostatic hypotension, pending vital signs)    # HLD  - continue home meds    # HTN  - hold ARB for now 84 year old male with a PMH GERD, HTN, HLD, recently diagnosed A flutter (never started Eliquis due to fear of side-effects), and cardiac stent 1.5 years ago, was transferred from [facility] after a fall from standing height. At [facility] [place], the pt was triaged and scans performed at 11:30am were significant for a SAH. When the pt was tx to [facility], the pt was seen and examined in the trauma bay. The pt was A&Ox3, speaking in full sentences, and had a GCS of 15. The pt stated that he was walking and "doesn't know what happened" but fell with (+) head strike, and that he recently fell prior on April 19th. Upon examination, the pt had a right parietal abrasion, ecchymosis on the right hip, and stated that he was seeing double.   [facility] consulted for comanagement.     # Traumatic SAH  - management per neurosurgery and trauma service  - Suggest syncope work up:       Monitor on telemetry (ensure there are no bradycardic events, or other significant arrhythmias)       TTE pending       Obtain Orthostatic vital signs  - PT evaluation    # Atrial flutter  - currently off metoprolol ER 25mg daily with Aflutter in 60s on telemetry.   - Would obtain EP consult, evaluate need for PPM vs ILR  - monitor vital signs    # Normocytic anemia  - Hg 10.4 appears stable  - Check CBC in AM  - TSH, B12 and folate WNL  - Suggest outpatient follow up with PCP    # BPH  - continue flomax for now (can result in orthostatic hypotension, pending vital signs)    # HLD  - continue home meds    # HTN  - hold ARB for now

## 2024-04-24 NOTE — PHYSICAL THERAPY INITIAL EVALUATION ADULT - PERTINENT HX OF CURRENT PROBLEM, REHAB EVAL
85 yo male s/p a fall who was found to have a left frontal hemorrhage and a left ambient cistern hemorrhage concerning for a AVF

## 2024-04-24 NOTE — CONSULT NOTE ADULT - PROBLEM SELECTOR RECOMMENDATION 2
.  - PCI LAD 12/2022  - Off brilinta approximately 2/24  - resume asa when cleared by neurosx team  - cont statin

## 2024-04-24 NOTE — PHYSICAL THERAPY INITIAL EVALUATION ADULT - ADDITIONAL COMMENTS
Pt reports living in private home with his wife who is able to assist. No steps to enter/inside. Independent prior with no DME. Pt drives

## 2024-04-24 NOTE — CONSULT NOTE ADULT - ASSESSMENT
A/P  84 year old male with GERD, HTN, HLD, CAD (PCI with JIMMIE to LAD 12/22), newly diagnosed a flutter (not on AC) and now with SAH.  Pt with 2 recent falls within past 2 weeks, with at least one being related to a syncopal episode, new onset Atrial flutter diagnosed about a week and a half ago on EKG at urgent care center and now with SAH after the most recent fall.   EP consulted as pt has been in Atrial Flutter with pauses on telemetry monitor since arriving at [facility].   Of note pt had been started on Toprol 25mg daily for which he took only 2 doses but does not know when the last dose was.  Pt does also state that he previously was on metoprolol years ago but was taken off of it but does not know why it was discontinued.  Pt also states that he was prescribed Eliquis after being diagnosed with Atrial flutter but did never took it because of concern for bleeding risk.    Telemetry- Atrial flutter ~60bpm with frequent pauses most under 3 secs (longest episode 4.6 secs @ 3:00am during sleeping hours), and 3 beats of NSVT.      EKG 8/24/24 @ 08:00 - typical Atrial Flutter VR 65 bpm    Recommendations  -TTE  -continue telemetry monitoring  -no indication for PPM at this time as pt is in typical Atrial flutter and pauses are all less then 5secs  -pt would benefit from SERGEY guided DCCV or MERARY ablation as a more definitive therapy for typical atrial flutter but would need to be cleared to start AC from neurosurgery standpoint first  -would hold all AV mariela blockers at this time  -possible ILR at discharge if pt does not have significant SND  -Case d/w Dr Pratt, Further recommendations to follow

## 2024-04-24 NOTE — CONSULT NOTE ADULT - SUBJECTIVE AND OBJECTIVE BOX
Patient is a 84y old  Male who presents with a chief complaint of Fall (24 Apr 2024 13:57)    HPI:  Pt is 84 year old male with a PMH gerd, HTN, HLD, newly diagnosed a flutter, and cardiac stent (not on blood thinners) transferred from [facility] after a fall from standing height. [place] [facility] [place] and scans performed at 11:30am were significant for a SAH. When the pt was tx to [facility], the pt was seen and examined in the trauma bay. The pt was A&Ox3, speaking in full sentences, and had a GCS of 15. The pt stated that he was walking and "doesn't know what happened" but fell with (+) head strike, and that he recently fell prior on April 19th. Upon examination, the pt had a right parietal abrasion, ecchymosis on the right hip, and stated that he was seeing double. The pt denied headaches and stated that he currently does not take any blood thinners, despite being prescribed them. Recently he stated that his cardiologist started him on a new medication which he said "could be the reason I fell".  (23 Apr 2024 14:00)      HISTORY OF PRESENT ILLNESS:   84yM PMH     PAST MEDICAL & SURGICAL HISTORY:    FAMILY HISTORY:      SOCIAL HISTORY:  Tobacco Use:  EtOH use:   Substance:    Allergies    Allergy Status Unknown    Intolerances        REVIEW OF SYSTEMS  Negative except as noted in HPI    HOME MEDICATIONS:  Home Medications:  apixaban 5 mg oral tablet: 1 tab(s) orally 2 times a day (23 Apr 2024 15:59)  aspirin 81 mg oral delayed release tablet: 1 tab(s) orally once a day (23 Apr 2024 15:59)  atorvastatin 40 mg oral tablet: 1 tab(s) orally once a day (23 Apr 2024 15:59)  ezetimibe 10 mg oral tablet: 1 tab(s) orally once a day (23 Apr 2024 15:59)  famotidine 20 mg oral tablet: 1 tab(s) orally 2 times a day (23 Apr 2024 15:59)  metoprolol succinate 25 mg oral tablet, extended release: 1 tab(s) orally once a day (23 Apr 2024 15:59)  olmesartan 20 mg oral tablet: 1 tab(s) orally once a day (23 Apr 2024 15:59)  tamsulosin 0.4 mg oral capsule: 1 cap(s) orally once a day (23 Apr 2024 15:59)      MEDICATIONS:  Antibiotics:    Neuro:  acetaminophen   IVPB .. 1000 milliGRAM(s) IV Intermittent every 6 hours PRN    Anticoagulation:    OTHER:  atorvastatin 40 milliGRAM(s) Oral at bedtime  chlorhexidine 2% Cloths 1 Application(s) Topical daily  famotidine    Tablet 20 milliGRAM(s) Oral daily  influenza  Vaccine (HIGH DOSE) 0.7 milliLiter(s) IntraMuscular once  polyethylene glycol 3350 17 Gram(s) Oral daily  senna 2 Tablet(s) Oral at bedtime  tamsulosin 0.4 milliGRAM(s) Oral at bedtime    IVF:      Vital Signs Last 24 Hrs  T(C): 37 (24 Apr 2024 11:11), Max: 37.1 (23 Apr 2024 23:54)  T(F): 98.6 (24 Apr 2024 11:11), Max: 98.7 (23 Apr 2024 23:54)  HR: 67 (24 Apr 2024 13:00) (52 - 67)  BP: 134/68 (24 Apr 2024 13:00) (114/57 - 149/66)  BP(mean): 86 (24 Apr 2024 13:00) (68 - 94)  RR: 17 (24 Apr 2024 13:00) (15 - 26)  SpO2: 97% (24 Apr 2024 13:00) (93% - 100%)    Parameters below as of 24 Apr 2024 08:00  Patient On (Oxygen Delivery Method): room air          PHYSICAL EXAM:  GENERAL: NAD, well-groomed  HEAD:  Atraumatic, normocephalic  NECK: Supple, nontender to palpation  [place] COMA SCORE: E- V- M- = 15       E: 4= opens eyes spontaneously 3= to voice 2= to noxious 1= no opening       V: 5= oriented 4= confused 3= inappropriate words 2= incomprehensible sounds 1= nonverbal 1T= intubated       M: 6= follows commands 5= localizes 4= withdraws 3= flexor posturing 2= extensor posturing 1= no movement  MENTAL STATUS: AAO x3; Awake; Opens eyes spontaneously; Appropriately conversant without aphasia; following simple commands  CRANIAL NERVES: Visual fields full to confrontation, PERRL. EOMI without nystagmus. Facial sensation intact V1-3 distribution b/l. Face symmetric w/ normal eye closure and smile, tongue midline. Hearing grossly intact. Speech clear. Head turning and shoulder shrug intact.   MOTOR: strength 5/5 b/l upper and lower extremities  CHEST/LUNG: Chest rise and fall equally and bilaterally   HEART: +S1/+S2   ABDOMEN: Soft, nontender, nondistended   EXTREMITIES:  2+ peripheral pulses, no clubbing, cyanosis, or edema  SKIN: Warm, dry; no rashes or lesions    LABS:                        10.4   6.55  )-----------( 135      ( 24 Apr 2024 03:56 )             32.3     04-24    137  |  107  |  21.7<H>  ----------------------------<  104<H>  4.1   |  20.0<L>  |  0.69    Ca    7.1<L>      24 Apr 2024 03:56  Phos  3.6     04-24  Mg     2.1     04-24    TPro  4.3<L>  /  Alb  2.6<L>  /  TBili  0.6  /  DBili  0.2  /  AST  22  /  ALT  13  /  AlkPhos  52  04-23    PT/INR - ( 24 Apr 2024 03:56 )   PT: 13.7 sec;   INR: 1.24 ratio         PTT - ( 23 Apr 2024 13:34 )  PTT:26.3 sec  Urinalysis Basic - ( 24 Apr 2024 03:56 )    Color: x / Appearance: x / SG: x / pH: x  Gluc: 104 mg/dL / Ketone: x  / Bili: x / Urobili: x   Blood: x / Protein: x / Nitrite: x   Leuk Esterase: x / RBC: x / WBC x   Sq Epi: x / Non Sq Epi: x / Bacteria: x        CULTURES:      RADIOLOGY & ADDITIONAL STUDIES:  4/23 CTH: acute SAH    4/23 CT C-spine: No evidence of an acute cervical spine fracture.    4/23 6hr CTH: stable    4/23 CTA H/N: Prominent vasculature L perimesencephalic cistern/Ltentorium. Cannot rule out dural AV fistula    4/24 24 h CTH: Stable     ASSESSMENT AND PLAN:   Assessment:	  85 yo male s/p a fall who was found to have a left frontal hemorrhage and a left ambient cistern hemorrhage concerning for a AVF    PLAN:  Neuro:  - Q1 hour Neuro checks, Q1 hour Vitals  - HOB 30 degrees, Neck midline position  - Maintain normothermia, PO acetaminophen for temp>38 C or pain  - 24 hour CTH stable   - CTA head/neck 4/23: Prominent vasculature L perimesencephalic cistern/Ltentorium. Cannot rule out dural AV fistula  - MRI then angio   - PT/OT   	  CV:  - SBP Goal<160  - EP and cardiology following for AV flutter with block; f/u recs   - Atorvastatin   - PRN: hydralazine and labetalol     Pulm:  - Supplemental O2 PRN to maintain Spo2>92%    GI:  - Regular diet   - Miralax and senna   - Famotidine   	  Gu:  - Monitor Electrolytes & Renal Function  - Flomax     Heme:  - Monitor H&H  - DVT ppx: SCDs  - Hold ASA and SQL; get MRI before starting   	  ID:  - Monitor WBC and Temperature    Endo  - Monitor BGL, maintain <180  - ISS          Patient is a 84y old  Male who presents with a chief complaint of Fall (24 Apr 2024 13:57)    HPI:  Pt is 84 year old male with a PMH gerd, HTN, HLD, newly diagnosed a flutter, and cardiac stent (not on blood thinners) transferred from [facility] after a fall from standing height. [place] [facility] [place] and scans performed at 11:30am were significant for a SAH. When the pt was tx to [facility], the pt was seen and examined in the trauma bay. The pt was A&Ox3, speaking in full sentences, and had a GCS of 15. The pt stated that he was walking and "doesn't know what happened" but fell with (+) head strike, and that he recently fell prior on April 19th. Upon examination, the pt had a right parietal abrasion, ecchymosis on the right hip, and stated that he was seeing double. The pt denied headaches and stated that he currently does not take any blood thinners, despite being prescribed them. Recently he stated that his cardiologist started him on a new medication which he said "could be the reason I fell".  (23 Apr 2024 14:00)      HISTORY OF PRESENT ILLNESS:   84yM PMH     PAST MEDICAL & SURGICAL HISTORY:    FAMILY HISTORY:      SOCIAL HISTORY:  Tobacco Use:  EtOH use:   Substance:    Allergies    Allergy Status Unknown    Intolerances        REVIEW OF SYSTEMS  Negative except as noted in HPI    HOME MEDICATIONS:  Home Medications:  apixaban 5 mg oral tablet: 1 tab(s) orally 2 times a day (23 Apr 2024 15:59)  aspirin 81 mg oral delayed release tablet: 1 tab(s) orally once a day (23 Apr 2024 15:59)  atorvastatin 40 mg oral tablet: 1 tab(s) orally once a day (23 Apr 2024 15:59)  ezetimibe 10 mg oral tablet: 1 tab(s) orally once a day (23 Apr 2024 15:59)  famotidine 20 mg oral tablet: 1 tab(s) orally 2 times a day (23 Apr 2024 15:59)  metoprolol succinate 25 mg oral tablet, extended release: 1 tab(s) orally once a day (23 Apr 2024 15:59)  olmesartan 20 mg oral tablet: 1 tab(s) orally once a day (23 Apr 2024 15:59)  tamsulosin 0.4 mg oral capsule: 1 cap(s) orally once a day (23 Apr 2024 15:59)      MEDICATIONS:  Antibiotics:    Neuro:  acetaminophen   IVPB .. 1000 milliGRAM(s) IV Intermittent every 6 hours PRN    Anticoagulation:    OTHER:  atorvastatin 40 milliGRAM(s) Oral at bedtime  chlorhexidine 2% Cloths 1 Application(s) Topical daily  famotidine    Tablet 20 milliGRAM(s) Oral daily  influenza  Vaccine (HIGH DOSE) 0.7 milliLiter(s) IntraMuscular once  polyethylene glycol 3350 17 Gram(s) Oral daily  senna 2 Tablet(s) Oral at bedtime  tamsulosin 0.4 milliGRAM(s) Oral at bedtime    IVF:      Vital Signs Last 24 Hrs  T(C): 37 (24 Apr 2024 11:11), Max: 37.1 (23 Apr 2024 23:54)  T(F): 98.6 (24 Apr 2024 11:11), Max: 98.7 (23 Apr 2024 23:54)  HR: 67 (24 Apr 2024 13:00) (52 - 67)  BP: 134/68 (24 Apr 2024 13:00) (114/57 - 149/66)  BP(mean): 86 (24 Apr 2024 13:00) (68 - 94)  RR: 17 (24 Apr 2024 13:00) (15 - 26)  SpO2: 97% (24 Apr 2024 13:00) (93% - 100%)    Parameters below as of 24 Apr 2024 08:00  Patient On (Oxygen Delivery Method): room air          PHYSICAL EXAM:  GENERAL: NAD, well-groomed  HEAD:  Atraumatic, normocephalic  NECK: Supple, nontender to palpation  [place] COMA SCORE: E- V- M- = 15       E: 4= opens eyes spontaneously 3= to voice 2= to noxious 1= no opening       V: 5= oriented 4= confused 3= inappropriate words 2= incomprehensible sounds 1= nonverbal 1T= intubated       M: 6= follows commands 5= localizes 4= withdraws 3= flexor posturing 2= extensor posturing 1= no movement  MENTAL STATUS: AAO x3; Awake; Opens eyes spontaneously; Appropriately conversant without aphasia; following simple commands  CRANIAL NERVES: Visual fields full to confrontation, PERRL. EOMI without nystagmus. Facial sensation intact V1-3 distribution b/l. Face symmetric w/ normal eye closure and smile, tongue midline. Hearing grossly intact. Speech clear. Head turning and shoulder shrug intact.   MOTOR: strength 5/5 b/l upper and lower extremities  CHEST/LUNG: Chest rise and fall equally and bilaterally   HEART: +S1/+S2   ABDOMEN: Soft, nontender, nondistended   EXTREMITIES:  2+ peripheral pulses, no clubbing, cyanosis, or edema  SKIN: Warm, dry; no rashes or lesions    LABS:                        10.4   6.55  )-----------( 135      ( 24 Apr 2024 03:56 )             32.3     04-24    137  |  107  |  21.7<H>  ----------------------------<  104<H>  4.1   |  20.0<L>  |  0.69    Ca    7.1<L>      24 Apr 2024 03:56  Phos  3.6     04-24  Mg     2.1     04-24    TPro  4.3<L>  /  Alb  2.6<L>  /  TBili  0.6  /  DBili  0.2  /  AST  22  /  ALT  13  /  AlkPhos  52  04-23    PT/INR - ( 24 Apr 2024 03:56 )   PT: 13.7 sec;   INR: 1.24 ratio         PTT - ( 23 Apr 2024 13:34 )  PTT:26.3 sec  Urinalysis Basic - ( 24 Apr 2024 03:56 )    Color: x / Appearance: x / SG: x / pH: x  Gluc: 104 mg/dL / Ketone: x  / Bili: x / Urobili: x   Blood: x / Protein: x / Nitrite: x   Leuk Esterase: x / RBC: x / WBC x   Sq Epi: x / Non Sq Epi: x / Bacteria: x        CULTURES:      RADIOLOGY & ADDITIONAL STUDIES:  4/23 CTH: acute SAH    4/23 CT C-spine: No evidence of an acute cervical spine fracture.    4/23 6hr CTH: stable    4/23 CTA H/N: Prominent vasculature L perimesencephalic cistern/Ltentorium. Cannot rule out dural AV fistula    4/24 24 h CTH: Stable     ASSESSMENT AND PLAN:   Assessment:	  85 yo male s/p a fall who was found to have a left frontal hemorrhage and a left ambient cistern hemorrhage concerning for a AVF    PLAN:  Neuro:  - Q1 hour Neuro checks, Q1 hour Vitals  - HOB 30 degrees, Neck midline position  - Maintain normothermia, PO acetaminophen for temp>38 C or pain  - 24 hour CTH stable   - CTA head/neck 4/23: Prominent vasculature L perimesencephalic cistern/Ltentorium. Cannot rule out dural AV fistula  - MRI then angio 4/25; preop tonight   - PT/OT   	  CV:  - SBP Goal<160  - EP and cardiology following for AV flutter with block; f/u recs   - Atorvastatin   - PRN: hydralazine and labetalol     Pulm:  - Supplemental O2 PRN to maintain Spo2>92%    GI:  - Regular diet   - Miralax and senna   - Famotidine   	  Gu:  - Monitor Electrolytes & Renal Function  - Flomax     Heme:  - Monitor H&H  - DVT ppx: SCDs  - Hold ASA and SQL; get MRI before starting   	  ID:  - Monitor WBC and Temperature    Endo  - Monitor BGL, maintain <180  - ISS          Patient is a 84y old  Male who presents with a chief complaint of Fall (24 Apr 2024 13:57)    HPI:  Pt is 84 year old male with a PMH gerd, HTN, HLD, newly diagnosed a flutter, and cardiac stent (not on blood thinners) transferred from [facility] after a fall from standing height. [place] [facility] [place] and scans performed at 11:30am were significant for a SAH. When the pt was tx to [facility], the pt was seen and examined in the trauma bay. The pt was A&Ox3, speaking in full sentences, and had a GCS of 15. The pt stated that he was walking and "doesn't know what happened" but fell with (+) head strike, and that he recently fell prior on April 19th. Upon examination, the pt had a right parietal abrasion, ecchymosis on the right hip, and stated that he was seeing double. The pt denied headaches and stated that he currently does not take any blood thinners, despite being prescribed them. Recently he stated that his cardiologist started him on a new medication which he said "could be the reason I fell".  (23 Apr 2024 14:00)      HISTORY OF PRESENT ILLNESS:   84yM PMH     PAST MEDICAL & SURGICAL HISTORY:    FAMILY HISTORY:      SOCIAL HISTORY:  Tobacco Use:  EtOH use:   Substance:    Allergies    Allergy Status Unknown    Intolerances        REVIEW OF SYSTEMS  Negative except as noted in HPI    HOME MEDICATIONS:  Home Medications:  apixaban 5 mg oral tablet: 1 tab(s) orally 2 times a day (23 Apr 2024 15:59)  aspirin 81 mg oral delayed release tablet: 1 tab(s) orally once a day (23 Apr 2024 15:59)  atorvastatin 40 mg oral tablet: 1 tab(s) orally once a day (23 Apr 2024 15:59)  ezetimibe 10 mg oral tablet: 1 tab(s) orally once a day (23 Apr 2024 15:59)  famotidine 20 mg oral tablet: 1 tab(s) orally 2 times a day (23 Apr 2024 15:59)  metoprolol succinate 25 mg oral tablet, extended release: 1 tab(s) orally once a day (23 Apr 2024 15:59)  olmesartan 20 mg oral tablet: 1 tab(s) orally once a day (23 Apr 2024 15:59)  tamsulosin 0.4 mg oral capsule: 1 cap(s) orally once a day (23 Apr 2024 15:59)      MEDICATIONS:  Antibiotics:    Neuro:  acetaminophen   IVPB .. 1000 milliGRAM(s) IV Intermittent every 6 hours PRN    Anticoagulation:    OTHER:  atorvastatin 40 milliGRAM(s) Oral at bedtime  chlorhexidine 2% Cloths 1 Application(s) Topical daily  famotidine    Tablet 20 milliGRAM(s) Oral daily  influenza  Vaccine (HIGH DOSE) 0.7 milliLiter(s) IntraMuscular once  polyethylene glycol 3350 17 Gram(s) Oral daily  senna 2 Tablet(s) Oral at bedtime  tamsulosin 0.4 milliGRAM(s) Oral at bedtime    IVF:      Vital Signs Last 24 Hrs  T(C): 37 (24 Apr 2024 11:11), Max: 37.1 (23 Apr 2024 23:54)  T(F): 98.6 (24 Apr 2024 11:11), Max: 98.7 (23 Apr 2024 23:54)  HR: 67 (24 Apr 2024 13:00) (52 - 67)  BP: 134/68 (24 Apr 2024 13:00) (114/57 - 149/66)  BP(mean): 86 (24 Apr 2024 13:00) (68 - 94)  RR: 17 (24 Apr 2024 13:00) (15 - 26)  SpO2: 97% (24 Apr 2024 13:00) (93% - 100%)    Parameters below as of 24 Apr 2024 08:00  Patient On (Oxygen Delivery Method): room air          PHYSICAL EXAM:  GENERAL: NAD, well-groomed  HEAD:  Atraumatic, normocephalic  NECK: Supple, nontender to palpation  [place] COMA SCORE: E- V- M- = 15       E: 4= opens eyes spontaneously 3= to voice 2= to noxious 1= no opening       V: 5= oriented 4= confused 3= inappropriate words 2= incomprehensible sounds 1= nonverbal 1T= intubated       M: 6= follows commands 5= localizes 4= withdraws 3= flexor posturing 2= extensor posturing 1= no movement  MENTAL STATUS: AAO x3; Awake; Opens eyes spontaneously; Appropriately conversant without aphasia; following simple commands  CRANIAL NERVES: PERRL. EOMI without nystagmus. Facial sensation intact V1-3 distribution b/l. Face symmetric w/ normal eye closure and smile, tongue midline. Hearing grossly intact. Speech clear. Head turning and shoulder shrug intact.   MOTOR: strength 5/5 b/l upper and lower extremities  CHEST/LUNG: Chest rise and fall equally and bilaterally   HEART: +S1/+S2   ABDOMEN: Soft, nontender, nondistended   EXTREMITIES:  2+ peripheral pulses, no clubbing, cyanosis, or edema  SKIN: Warm, dry; no rashes or lesions    LABS:                        10.4   6.55  )-----------( 135      ( 24 Apr 2024 03:56 )             32.3     04-24    137  |  107  |  21.7<H>  ----------------------------<  104<H>  4.1   |  20.0<L>  |  0.69    Ca    7.1<L>      24 Apr 2024 03:56  Phos  3.6     04-24  Mg     2.1     04-24    TPro  4.3<L>  /  Alb  2.6<L>  /  TBili  0.6  /  DBili  0.2  /  AST  22  /  ALT  13  /  AlkPhos  52  04-23    PT/INR - ( 24 Apr 2024 03:56 )   PT: 13.7 sec;   INR: 1.24 ratio         PTT - ( 23 Apr 2024 13:34 )  PTT:26.3 sec  Urinalysis Basic - ( 24 Apr 2024 03:56 )    Color: x / Appearance: x / SG: x / pH: x  Gluc: 104 mg/dL / Ketone: x  / Bili: x / Urobili: x   Blood: x / Protein: x / Nitrite: x   Leuk Esterase: x / RBC: x / WBC x   Sq Epi: x / Non Sq Epi: x / Bacteria: x        CULTURES:      RADIOLOGY & ADDITIONAL STUDIES:  4/23 CTH: acute SAH    4/23 CT C-spine: No evidence of an acute cervical spine fracture.    4/23 6hr CTH: stable    4/23 CTA H/N: Prominent vasculature L perimesencephalic cistern/Ltentorium. Cannot rule out dural AV fistula    4/24 24 h CTH: Stable     ASSESSMENT AND PLAN:   Assessment:	  83 yo male s/p a fall who was found to have a left frontal hemorrhage and a left ambient cistern hemorrhage concerning for a AVF    PLAN:  Neuro:  - Q1 hour Neuro checks, Q1 hour Vitals  - HOB 30 degrees, Neck midline position  - Maintain normothermia, PO acetaminophen for temp>38 C or pain  - 24 hour CTH stable   - CTA head/neck 4/23: Prominent vasculature L perimesencephalic cistern/Ltentorium. Cannot rule out dural AV fistula  - MRI then angio 4/25; preop tonight   - PT/OT   	  CV:  - SBP Goal<160  - EP and cardiology following for AV flutter with block; f/u recs   - Atorvastatin   - PRN: hydralazine and labetalol     Pulm:  - Supplemental O2 PRN to maintain Spo2>92%    GI:  - Regular diet   - Miralax and senna   - Famotidine   	  Gu:  - Monitor Electrolytes & Renal Function  - Flomax     Heme:  - Monitor H&H  - DVT ppx: SCDs  - Hold ASA and SQL; get MRI before starting   	  ID:  - Monitor WBC and Temperature    Endo  - Monitor BGL, maintain <180  - ISS

## 2024-04-24 NOTE — PHYSICAL THERAPY INITIAL EVALUATION ADULT - LEVEL OF INDEPENDENCE: SIT/SUPINE, REHAB EVAL
returned to [facility] with patient transport and KLEBER Skelton to go to CT Scan/supervision

## 2024-04-24 NOTE — CONSULT NOTE ADULT - SUBJECTIVE AND OBJECTIVE BOX
[facility]                                              CARDIOLOGY AT [address], [place] [address]                                             Telephone: [phone]. Fax:[phone]                                                       CARDIOLOGY CONSULTATION NOTE                                                                                             History obtained by: Patient and medical record  Community Cardiologist:    obtained: Yes [  ] No [  ]  Reason for Consultation:   Available out pt records reviewed: Yes [  ] No [  ]    Chief complaint:    Patient is a 84y old  Male who presents with a chief complaint of Fall (24 Apr 2024 08:33)      HPI:  Pt is 84 year old male with a PMH gerd, HTN, HLD, newly diagnosed a flutter, and cardiac stent (not on blood thinners) transferred from [facility] after a [place] from standing height. [place] [facility] [place] and scans performed at 11:30am were significant for a SAH. When the pt was tx to [facility], the pt was seen and examined in the trauma bay. The pt was A&Ox3, speaking in full sentences, and had a GCS of 15. The pt stated that he was walking and "doesn't know what happened" but fell with (+) head strike, and that he recently fell prior on April 19th. Upon examination, the pt had a right parietal abrasion, ecchymosis on the right hip, and stated that he was seeing double. The pt denied headaches and stated that he currently does not take any blood thinners, despite being prescribed them. Recently he stated that his cardiologist started him on a new medication which he said "could be the reason I fell".  (23 Apr 2024 14:00)      CARDIAC TESTING   pending echo      PAST MEDICAL HISTORY      PAST SURGICAL HISTORY      SOCIAL HISTORY:  Denies smoking/alcohol/drugs  lives at home with wife      FAMILY HISTORY:  Denies         HOME MEDICATIONS:  aspirin 81 mg oral delayed release tablet: 1 tab(s) orally once a day (23 Apr 2024 15:59)  atorvastatin 40 mg oral tablet: 1 tab(s) orally once a day (23 Apr 2024 15:59)  ezetimibe 10 mg oral tablet: 1 tab(s) orally once a day (23 Apr 2024 15:59)  famotidine 20 mg oral tablet: 1 tab(s) orally 2 times a day (23 Apr 2024 15:59)  metoprolol succinate 25 mg oral tablet, extended release: 1 tab(s) orally once a day (23 Apr 2024 15:59)  olmesartan 20 mg oral tablet: 1 tab(s) orally once a day (23 Apr 2024 15:59)  tamsulosin 0.4 mg oral capsule: 1 cap(s) orally once a day (23 Apr 2024 15:59)        CURRENT CARDIAC MEDICATIONS:        CURRENT OTHER MEDICATIONS:  acetaminophen   IVPB .. 1000 milliGRAM(s) IV Intermittent every 6 hours, Stop order after: 4 Doses PRN Temp greater or equal to 38C (100.4F), Mild Pain (1 - 3)  famotidine    Tablet 20 milliGRAM(s) Oral daily  atorvastatin 40 milliGRAM(s) Oral at bedtime  chlorhexidine 2% Cloths 1 Application(s) Topical daily  influenza  Vaccine (HIGH DOSE) 0.7 milliLiter(s) IntraMuscular once  multiple electrolytes Injection Type 1 1000 milliLiter(s) (100 mL/Hr) IV Continuous <Continuous>  tamsulosin 0.4 milliGRAM(s) Oral at bedtime        ALLERGIES:   Allergy Status Unknown        REVIEW OF SYMPTOMS:   CONSTITUTIONAL: No fever, no chills, no weight loss, no weight gain, no fatigue   ENMT:  No vertigo; No sinus or throat pain  NECK: No pain or stiffness  CARDIOVASCULAR: No chest pain, no dyspnea, no syncope/presyncope, no palpitations, no dizziness, no Orthopnea, no Paroxsymal nocturnal dyspnea  RESPIRATORY: no Shortness of breath, no cough, no wheezing  : No dysuria, no hematuria   GI: No dark color stool, no nausea, no diarrhea, no constipation, no abdominal pain   NEURO: No headache, no slurred speech   MUSCULOSKELETAL: No joint pain or swelling; No muscle, back, or extremity pain  PSYCH: No agitation, no anxiety.    ALL OTHER REVIEW OF SYSTEMS ARE NEGATIVE.        VITAL SIGNS:  T(C): 36.9 (04-24-24 @ 07:23), Max: 37.1 (04-23-24 @ 23:54)  T(F): 98.5 (04-24-24 @ 07:23), Max: 98.7 (04-23-24 @ 23:54)  HR: 66 (04-24-24 @ 09:00) (52 - 70)  BP: 125/45 (04-24-24 @ 09:00) (114/57 - 149/66)  RR: 18 (04-24-24 @ 09:00) (18 - 26)  SpO2: 95% (04-24-24 @ 09:00) (93% - 100%)        INTAKE AND OUTPUT:     04-23 @ 07:01  -  04-24 @ 07:00  --------------------------------------------------------  IN: 1600 mL / OUT: 1050 mL / NET: 550 mL        PHYSICAL EXAM:  Constitutional: Comfortable . No acute distress.   HEENT: Atraumatic and normocephalic , neck is supple . no JVD. No carotid bruit.  CNS: A&Ox3. No focal deficits.   Respiratory: CTAB, unlabored   Cardiovascular: RRR normal s1 s2. No murmur. No rubs or gallop.  Gastrointestinal: Soft, non-tender. +Bowel sounds.   Extremities: 2+ Peripheral Pulses, No clubbing, cyanosis, or edema  Psychiatric: Calm . no agitation.   Skin: Warm and dry, no ulcers on extremities         LABS:  ( 23 Apr 2024 13:34 )  Troponin T  X    ,  CPK  185  , CKMB  X    , BNP X                                  10.4   6.55  )-----------( 135      ( 24 Apr 2024 03:56 )             32.3     04-24    137  |  107  |  21.7<H>  ----------------------------<  104<H>  4.1   |  20.0<L>  |  0.69    Ca    7.1<L>      24 Apr 2024 03:56  Phos  3.6     04-24  Mg     2.1     04-24    TPro  4.3<L>  /  Alb  2.6<L>  /  TBili  0.6  /  DBili  0.2  /  AST  22  /  ALT  13  /  AlkPhos  52  04-23    PT/INR - ( 24 Apr 2024 03:56 )   PT: 13.7 sec;   INR: 1.24 ratio         PTT - ( 23 Apr 2024 13:34 )  PTT:26.3 sec  Urinalysis Basic - ( 24 Apr 2024 03:56 )    Color: x / Appearance: x / SG: x / pH: x  Gluc: 104 mg/dL / Ketone: x  / Bili: x / Urobili: x   Blood: x / Protein: x / Nitrite: x   Leuk Esterase: x / RBC: x / WBC x   Sq Epi: x / Non Sq Epi: x / Bacteria: x        Thyroid Stimulating Hormone, Serum: 0.48 uIU/mL (04-24-24 @ 03:56)      INTERPRETATION OF TELEMETRY: Aflutter, Afib, variable rates, pauses majority under 3 seconds, longest pause 4.5 seconds     ECG: rate 65 aflutter variable rates, no ischemia, no infarct      RADIOLOGY & ADDITIONAL STUDIES:    X-ray:    CT scan:   MRI:   US:                                                [facility]                                              CARDIOLOGY AT [address], [place] [address]                                             Telephone: [phone]. Fax:[phone]                                                       CARDIOLOGY CONSULTATION NOTE                                                                                             History obtained by: Patient and medical record  Community Cardiologist: Juan Ramon   obtained: Yes [  ] No [ x ]  Reason for Consultation: aflutter  Available out pt records reviewed: Yes [  ] No [x  ]    Chief complaint:    Patient is a 84y old  Male who presents with a chief complaint of Fall (24 Apr 2024 08:33)      HPI:  84 year old male with a PMH gerd, HTN, HLD, newly diagnosed a flutter, CAD (PCI LAD 12/22) [place] [facility] [place] standing [place]. States fell two times, first time recalls feeling a hot flash, grabbed chair and then fell. 2nd fall patient does not recall. Patient does not know if he passed out. Denies other syncopal episodes. Denies chest pain shortness of breath. [facility] PCP diagnosed aflutter, within past week, has not seen cardiologist yet. [facility] takes Asa, taken off brilinta few months ago, recently doseages of metoprolol changed, but can not recall if he took new dose or not. TTE 12/22 normal EF, no rwma.      CARDIAC TESTING   pending echo      PAST MEDICAL HISTORY      PAST SURGICAL HISTORY      SOCIAL HISTORY:  Denies smoking/alcohol/drugs  lives at home with wife      FAMILY HISTORY:  Denies         HOME MEDICATIONS:  aspirin 81 mg oral delayed release tablet: 1 tab(s) orally once a day (23 Apr 2024 15:59)  atorvastatin 40 mg oral tablet: 1 tab(s) orally once a day (23 Apr 2024 15:59)  ezetimibe 10 mg oral tablet: 1 tab(s) orally once a day (23 Apr 2024 15:59)  famotidine 20 mg oral tablet: 1 tab(s) orally 2 times a day (23 Apr 2024 15:59)  metoprolol succinate 25 mg oral tablet, extended release: 1 tab(s) orally once a day (23 Apr 2024 15:59)  olmesartan 20 mg oral tablet: 1 tab(s) orally once a day (23 Apr 2024 15:59)  tamsulosin 0.4 mg oral capsule: 1 cap(s) orally once a day (23 Apr 2024 15:59)        CURRENT CARDIAC MEDICATIONS:        CURRENT OTHER MEDICATIONS:  acetaminophen   IVPB .. 1000 milliGRAM(s) IV Intermittent every 6 hours, Stop order after: 4 Doses PRN Temp greater or equal to 38C (100.4F), Mild Pain (1 - 3)  famotidine    Tablet 20 milliGRAM(s) Oral daily  atorvastatin 40 milliGRAM(s) Oral at bedtime  chlorhexidine 2% Cloths 1 Application(s) Topical daily  influenza  Vaccine (HIGH DOSE) 0.7 milliLiter(s) IntraMuscular once  multiple electrolytes Injection Type 1 1000 milliLiter(s) (100 mL/Hr) IV Continuous <Continuous>  tamsulosin 0.4 milliGRAM(s) Oral at bedtime        ALLERGIES:   Allergy Status Unknown        REVIEW OF SYMPTOMS:   CONSTITUTIONAL: No fever, no chills, no weight loss, no weight gain, no fatigue   ENMT:  No vertigo; No sinus or throat pain  NECK: No pain or stiffness  CARDIOVASCULAR: No chest pain, no dyspnea, no syncope/presyncope, no palpitations, no dizziness, no Orthopnea, no Paroxsymal nocturnal dyspnea  RESPIRATORY: no Shortness of breath, no cough, no wheezing  : No dysuria, no hematuria   GI: No dark color stool, no nausea, no diarrhea, no constipation, no abdominal pain   NEURO: No headache, no slurred speech   MUSCULOSKELETAL: No joint pain or swelling; No muscle, back, or extremity pain  PSYCH: No agitation, no anxiety.    ALL OTHER REVIEW OF SYSTEMS ARE NEGATIVE.        VITAL SIGNS:  T(C): 36.9 (04-24-24 @ 07:23), Max: 37.1 (04-23-24 @ 23:54)  T(F): 98.5 (04-24-24 @ 07:23), Max: 98.7 (04-23-24 @ 23:54)  HR: 66 (04-24-24 @ 09:00) (52 - 70)  BP: 125/45 (04-24-24 @ 09:00) (114/57 - 149/66)  RR: 18 (04-24-24 @ 09:00) (18 - 26)  SpO2: 95% (04-24-24 @ 09:00) (93% - 100%)        INTAKE AND OUTPUT:     04-23 @ 07:01  -  04-24 @ 07:00  --------------------------------------------------------  IN: 1600 mL / OUT: 1050 mL / NET: 550 mL        PHYSICAL EXAM:  Constitutional: Comfortable . No acute distress.   HEENT: normocephalic , neck is supple . no JVD.   CNS: A&Ox3. No focal deficits.   Respiratory: CTAB, unlabored   Cardiovascular: RRR normal s1 s2. No murmur.   Gastrointestinal: Soft, non-tender. +Bowel sounds.   Extremities: + edema  Psychiatric: Calm . no agitation.   Skin: Warm and dry, no ulcers on extremities         LABS:  ( 23 Apr 2024 13:34 )  Troponin T  X    ,  CPK  185  , CKMB  X    , BNP X                                10.4   6.55  )-----------( 135      ( 24 Apr 2024 03:56 )             32.3     04-24    137  |  107  |  21.7<H>  ----------------------------<  104<H>  4.1   |  20.0<L>  |  0.69    Ca    7.1<L>      24 Apr 2024 03:56  Phos  3.6     04-24  Mg     2.1     04-24    TPro  4.3<L>  /  Alb  2.6<L>  /  TBili  0.6  /  DBili  0.2  /  AST  22  /  ALT  13  /  AlkPhos  52  04-23    PT/INR - ( 24 Apr 2024 03:56 )   PT: 13.7 sec;   INR: 1.24 ratio         PTT - ( 23 Apr 2024 13:34 )  PTT:26.3 sec  Urinalysis Basic - ( 24 Apr 2024 03:56 )    Color: x / Appearance: x / SG: x / pH: x  Gluc: 104 mg/dL / Ketone: x  / Bili: x / Urobili: x   Blood: x / Protein: x / Nitrite: x   Leuk Esterase: x / RBC: x / WBC x   Sq Epi: x / Non Sq Epi: x / Bacteria: x      Thyroid Stimulating Hormone, Serum: 0.48 uIU/mL (04-24-24 @ 03:56)      INTERPRETATION OF TELEMETRY: Aflutter, Afib, variable rates, pauses majority under 3 seconds, longest pause 4.5 seconds       ECG: rate 65 aflutter variable rates, no ischemia, no infarct      RADIOLOGY & ADDITIONAL STUDIES:    X-ray:    CT scan:   < from: CT Angio Neck w/ IV Cont (04.23.24 @ 17:53) >    IMPRESSION:  CT head:  -Stable left perimesencephalic cistern and left superior frontal   subarachnoid hemorrhage.  -No new or worsening intracranial hemorrhage.    CT angiogram head:  -Prominent vasculature in the left perimesencephalic cistern/left   tentorium. Differential includes reactive changes due to known hemorrhage   versus underlying dural AV fistula. Recommend neurointerventional   consultation to consider conventional angiography.    CT angiogram neck:  -No vaso-occlusive disease.    < end of copied text >    MRI:   US:                                                [facility]                                              CARDIOLOGY AT [address], [place] [address]                                             Telephone: [phone]. Fax:[phone]                                                       CARDIOLOGY CONSULTATION NOTE                                                                                             History obtained by: Patient and medical record  Community Cardiologist:  Dr. Markus Delatorre   obtained: Yes [  ] No [ x ]  Reason for Consultation: andres  Available out pt records reviewed: Yes [  ] No [x  ]    Chief complaint:    Patient is a 84y old  Male who presents with a chief complaint of Fall (24 Apr 2024 08:33)      HPI:  84 year old male with a PMH gerd, HTN, HLD, newly diagnosed a flutter, CAD (PCI LAD 12/22) [place] [facility] [place]. States fell two times, first time recalls feeling a hot flash, grabbed chair and then fell. 2nd fall patient does not recall. Patient does not know if he passed out. Denies other syncopal episodes. Denies chest pain shortness of breath. [facility] P[facility] diagnosed aflutter, within past week, has not seen cardiologist yet. [facility] takes As[facility], taken off brilinta few months ago, recently doseages of metoprolol changed, but can not recall if he took new dose or not. TTE 12/22 normal EF, no rwma.      CARDIAC TESTING   pending echo      PAST MEDICAL HISTORY      PAST SURGICAL HISTORY      SOCIAL HISTORY:  Denies smoking/alcohol/drugs  lives at home with wife      FAMILY HISTORY:  Denies         HOME MEDICATIONS:  aspirin 81 mg oral delayed release tablet: 1 tab(s) orally once a day (23 Apr 2024 15:59)  atorvastatin 40 mg oral tablet: 1 tab(s) orally once a day (23 Apr 2024 15:59)  ezetimibe 10 mg oral tablet: 1 tab(s) orally once a day (23 Apr 2024 15:59)  famotidine 20 mg oral tablet: 1 tab(s) orally 2 times a day (23 Apr 2024 15:59)  metoprolol succinate 25 mg oral tablet, extended release: 1 tab(s) orally once a day (23 Apr 2024 15:59)  olmesartan 20 mg oral tablet: 1 tab(s) orally once a day (23 Apr 2024 15:59)  tamsulosin 0.4 mg oral capsule: 1 cap(s) orally once a day (23 Apr 2024 15:59)        CURRENT CARDIAC MEDICATIONS:        CURRENT OTHER MEDICATIONS:  acetaminophen   IVPB .. 1000 milliGRAM(s) IV Intermittent every 6 hours, Stop order after: 4 Doses PRN Temp greater or equal to 38C (100.4F), Mild Pain (1 - 3)  famotidine    Tablet 20 milliGRAM(s) Oral daily  atorvastatin 40 milliGRAM(s) Oral at bedtime  chlorhexidine 2% Cloths 1 Application(s) Topical daily  influenza  Vaccine (HIGH DOSE) 0.7 milliLiter(s) IntraMuscular once  multiple electrolytes Injection Type 1 1000 milliLiter(s) (100 mL/Hr) IV Continuous <Continuous>  tamsulosin 0.4 milliGRAM(s) Oral at bedtime        ALLERGIES:   Allergy Status Unknown        REVIEW OF SYMPTOMS:   CONSTITUTIONAL: No fever, no chills, no weight loss, no weight gain, no fatigue   ENMT:  No vertigo; No sinus or throat pain  NECK: No pain or stiffness  CARDIOVASCULAR: No chest pain, no dyspnea, no syncope/presyncope, no palpitations, no dizziness, no Orthopnea, no Paroxsymal nocturnal dyspnea  RESPIRATORY: no Shortness of breath, no cough, no wheezing  : No dysuria, no hematuria   GI: No dark color stool, no nausea, no diarrhea, no constipation, no abdominal pain   NEURO: No headache, no slurred speech   MUSCULOSKELETAL: No joint pain or swelling; No muscle, back, or extremity pain  PSYCH: No agitation, no anxiety.    ALL OTHER REVIEW OF SYSTEMS ARE NEGATIVE.        VITAL SIGNS:  T(C): 36.9 (04-24-24 @ 07:23), Max: 37.1 (04-23-24 @ 23:54)  T(F): 98.5 (04-24-24 @ 07:23), Max: 98.7 (04-23-24 @ 23:54)  HR: 66 (04-24-24 @ 09:00) (52 - 70)  BP: 125/45 (04-24-24 @ 09:00) (114/57 - 149/66)  RR: 18 (04-24-24 @ 09:00) (18 - 26)  SpO2: 95% (04-24-24 @ 09:00) (93% - 100%)        INTAKE AND OUTPUT:     04-23 @ 07:01 - 04-24 @ 07:00  --------------------------------------------------------  IN: 1600 mL / OUT: 1050 mL / NET: 550 mL        PHYSICAL EXAM:  Constitutional: Comfortable . No acute distress.   HEENT: normocephalic , neck is supple . no JVD.   CNS: A&Ox3. No focal deficits.   Respiratory: CTAB, unlabored   Cardiovascular: RRR normal s1 s2. No murmur.   Gastrointestinal: Soft, non-tender. +Bowel sounds.   Extremities: + edema  Psychiatric: Calm . no agitation.   Skin: Warm and dry, no ulcers on extremities         LABS:  ( 23 Apr 2024 13:34 )  Troponin T  X    ,  CPK  185  , CKMB  X    , BNP X                                10.4   6.55  )-----------( 135      ( 24 Apr 2024 03:56 )             32.3     04-24    137  |  107  |  21.7<H>  ----------------------------<  104<H>  4.1   |  20.0<L>  |  0.69    Ca    7.1<L>      24 Apr 2024 03:56  Phos  3.6     04-24  Mg     2.1     04-24    TPro  4.3<L>  /  Alb  2.6<L>  /  TBili  0.6  /  DBili  0.2  /  AST  22  /  ALT  13  /  AlkPhos  52  04-23    PT/INR - ( 24 Apr 2024 03:56 )   PT: 13.7 sec;   INR: 1.24 ratio         PTT - ( 23 Apr 2024 13:34 )  PTT:26.3 sec  Urinalysis Basic - ( 24 Apr 2024 03:56 )    Color: x / Appearance: x / SG: x / pH: x  Gluc: 104 mg/dL / Ketone: x  / Bili: x / Urobili: x   Blood: x / Protein: x / Nitrite: x   Leuk Esterase: x / RBC: x / WBC x   Sq Epi: x / Non Sq Epi: x / Bacteria: x      Thyroid Stimulating Hormone, Serum: 0.48 uIU/mL (04-24-24 @ 03:56)      INTERPRETATION OF TELEMETRY: Aflutter, Afib, variable rates, pauses majority under 3 seconds, longest pause 4.5 seconds       ECG: rate 65 aflutter variable rates, no ischemia, no infarct      RADIOLOGY & ADDITIONAL STUDIES:    X-ray:    CT scan:   < from: CT Angio Neck w/ IV Cont (04.23.24 @ 17:53) >    IMPRESSION:  CT head:  -Stable left perimesencephalic cistern and left superior frontal   subarachnoid hemorrhage.  -No new or worsening intracranial hemorrhage.    CT angiogram head:  -Prominent vasculature in the left perimesencephalic cistern/left   tentorium. Differential includes reactive changes due to known hemorrhage   versus underlying dural AV fistula. Recommend neurointerventional   consultation to consider conventional angiography.    CT angiogram neck:  -No vaso-occlusive disease.    < end of copied text >    MRI:   US:

## 2024-04-24 NOTE — PROGRESS NOTE ADULT - ASSESSMENT
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

## 2024-04-24 NOTE — PROGRESS NOTE ADULT - SUBJECTIVE AND OBJECTIVE BOX
HPI:  HPI:  Pt is 84 year old male with a PMH gerd, HTN, HLD, newly diagnosed a flutter, and cardiac stent (not on blood thinners) transferred from [facility] after a fall from standing height. At [facility], the pt was triaged and scans performed at 11:30am were significant for a SAH. When the pt was tx to [facility], the pt was seen and examined in the trauma bay. The pt was A&Ox3, speaking in full sentences, and had a GCS of 15. The pt stated that he was walking and "doesn't know what happened" but fell with (+) head strike, and that he recently fell prior on April 19th. Upon examination, the pt had a right parietal abrasion, ecchymosis on the right hip, and stated that he was seeing double. The pt denied headaches and stated that he currently does not take any blood thinners, despite being prescribed them. Recently he stated that his cardiologist started him on a new medication which he said "could be the reason I fell".  (23 Apr 2024 14:00)        INTERVAL HPI/OVERNIGHT EVENTS:  POD#  Hospital Day#    84y Male s/p __ seen lying comfortably in bed. Tolerating diet. Passing gas/BM. Voiding. Santana in with __ clear urine. Denies headache, weakness, numbness, n/v/d, fevers, chills, chest pain, SOB.       Vital Signs Last 24 Hrs  T(C): 36.9 (24 Apr 2024 07:23), Max: 37.1 (23 Apr 2024 23:54)  T(F): 98.5 (24 Apr 2024 07:23), Max: 98.7 (23 Apr 2024 23:54)  HR: 55 (24 Apr 2024 06:00) (52 - 70)  BP: 117/55 (24 Apr 2024 06:00) (114/57 - 149/66)  BP(mean): 70 (24 Apr 2024 06:00) (70 - 88)  RR: 21 (24 Apr 2024 06:00) (18 - 26)  SpO2: 95% (24 Apr 2024 06:00) (93% - 100%)    Parameters below as of 23 Apr 2024 15:30  Patient On (Oxygen Delivery Method): room air      PHYSICAL EXAM:  GENERAL: NAD, well-groomed, well-developed  HEAD: (+) Scalp abrasion  MENTAL STATUS: AAO x3; Awake/Comatose; Opens eyes spontaneously/to voice/to light touch/to noxious stimuli; Appropriately conversant without aphasia/Nonverbal; following simple commands/mimicking/not following commands  CRANIAL NERVES: PERRL; EOMI; corneals intact b/l; Dolls sign positive; blinks to threat b/l; no facial asymmetry; facial sensation grossly intact to light touch b/l;  tongue midline; palate rises symmetrically; gag reflex intact  MOTOR: strength 5/5 B/L upper and lower extremities; sensation grossly intact all extremities; DTRs 2+ intact and symmetric; negative Goetz's b/l; negative clonus b/l  CHEST/LUNG: Clear to auscultation bilaterally; no rales, rhonchi, wheezing, or rubs  HEART: +S1/+S2; Regular rate and rhythm; no murmurs, rubs, or gallops  ABDOMEN: Soft, nontender, nondistended; bowel sounds present all four quadrants  EXTREMITIES:  2+ peripheral pulses, no clubbing, cyanosis, or edema  SKIN: Warm, dry; no rashes or lesions      LABS:                        10.4   6.55  )-----------( 135      ( 24 Apr 2024 03:56 )             32.3     04-24    137  |  107  |  21.7<H>  ----------------------------<  104<H>  4.1   |  20.0<L>  |  0.69    Ca    7.1<L>      24 Apr 2024 03:56  Phos  3.6     04-24  Mg     2.1     04-24    TPro  4.3<L>  /  Alb  2.6<L>  /  TBili  0.6  /  DBili  0.2  /  AST  22  /  ALT  13  /  AlkPhos  52  04-23    PT/INR - ( 24 Apr 2024 03:56 )   PT: 13.7 sec;   INR: 1.24 ratio         PTT - ( 23 Apr 2024 13:34 )  PTT:26.3 sec  Urinalysis Basic - ( 24 Apr 2024 03:56 )    Color: x / Appearance: x / SG: x / pH: x  Gluc: 104 mg/dL / Ketone: x  / Bili: x / Urobili: x   Blood: x / Protein: x / Nitrite: x   Leuk Esterase: x / RBC: x / WBC x   Sq Epi: x / Non Sq Epi: x / Bacteria: x        04-23 @ 07:01  -  04-24 @ 07:00  --------------------------------------------------------  IN: 1600 mL / OUT: 1050 mL / NET: 550 mL        RADIOLOGY & ADDITIONAL TESTS:   HPI:  Pt is 84 year old male with a PMH gerd, HTN, HLD, newly diagnosed a flutter, and cardiac stent (not on blood thinners) transferred from [facility] after a fall from standing height. At [facility] [place], the pt was triaged and scans performed at 11:30am were significant for a SAH. When the pt was tx to [facility], the pt was seen and examined in the trauma bay. The pt was A&Ox3, speaking in full sentences, and had a GCS of 15. The pt stated that he was walking and "doesn't know what happened" but fell with (+) head strike, and that he recently fell prior on April 19th. Upon examination, the pt had a right parietal abrasion, ecchymosis on the right hip, and stated that he was seeing double. The pt denied headaches and stated that he currently does not take any blood thinners, despite being prescribed them. Recently he stated that his cardiologist started him on a new medication which he said "could be the reason I fell".  (23 Apr 2024 14:00)    INTERVAL HPI      OVERNIGHT EVENTS:    83 yo male seen and examined       Vital Signs Last 24 Hrs  T(C): 36.9 (24 Apr 2024 07:23), Max: 37.1 (23 Apr 2024 23:54)  T(F): 98.5 (24 Apr 2024 07:23), Max: 98.7 (23 Apr 2024 23:54)  HR: 55 (24 Apr 2024 06:00) (52 - 70)  BP: 117/55 (24 Apr 2024 06:00) (114/57 - 149/66)  BP(mean): 70 (24 Apr 2024 06:00) (70 - 88)  RR: 21 (24 Apr 2024 06:00) (18 - 26)  SpO2: 95% (24 Apr 2024 06:00) (93% - 100%)    Parameters below as of 23 Apr 2024 15:30  Patient On (Oxygen Delivery Method): room air      PHYSICAL EXAM:  GENERAL: NAD, well-groomed, well-developed  HEAD: (+) Scalp abrasion  MENTAL STATUS: AAO x3; Awake/Comatose; Opens eyes spontaneously/to voice/to light touch/to noxious stimuli; Appropriately conversant without aphasia/Nonverbal; following simple commands/mimicking/not following commands  CRANIAL NERVES: PERRL; EOMI; corneals intact b/l; Dolls sign positive; blinks to threat b/l; no facial asymmetry; facial sensation grossly intact to light touch b/l;  tongue midline; palate rises symmetrically; gag reflex intact  MOTOR: strength 5/5 B/L upper and lower extremities; sensation grossly intact all extremities; DTRs 2+ intact and symmetric; negative Goetz's b/l; negative clonus b/l  CHEST/LUNG: Clear to auscultation bilaterally; no rales, rhonchi, wheezing, or rubs  HEART: +S1/+S2; Regular rate and rhythm; no murmurs, rubs, or gallops  ABDOMEN: Soft, nontender, nondistended; bowel sounds present all four quadrants  EXTREMITIES:  2+ peripheral pulses, no clubbing, cyanosis, or edema  SKIN: Warm, dry; no rashes or lesions      LABS:                        10.4   6.55  )-----------( 135      ( 24 Apr 2024 03:56 )             32.3     04-24    137  |  107  |  21.7<H>  ----------------------------<  104<H>  4.1   |  20.0<L>  |  0.69    Ca    7.1<L>      24 Apr 2024 03:56  Phos  3.6     04-24  Mg     2.1     04-24    TPro  4.3<L>  /  Alb  2.6<L>  /  TBili  0.6  /  DBili  0.2  /  AST  22  /  ALT  13  /  AlkPhos  52  04-23    PT/INR - ( 24 Apr 2024 03:56 )   PT: 13.7 sec;   INR: 1.24 ratio         PTT - ( 23 Apr 2024 13:34 )  PTT:26.3 sec  Urinalysis Basic - ( 24 Apr 2024 03:56 )    Color: x / Appearance: x / SG: x / pH: x  Gluc: 104 mg/dL / Ketone: x  / Bili: x / Urobili: x   Blood: x / Protein: x / Nitrite: x   Leuk Esterase: x / RBC: x / WBC x   Sq Epi: x / Non Sq Epi: x / Bacteria: x        04-23 @ 07:01  -  04-24 @ 07:00  --------------------------------------------------------  IN: 1600 mL / OUT: 1050 mL / NET: 550 mL        RADIOLOGY & ADDITIONAL TESTS:   HPI:  Pt is 84 year old male with a PMH GEDRD, HTN, HLD, newly diagnosed a flutter, and cardiac stent (not on blood thinners) transferred from [facility] after a fall from standing height. At [facility] [place], the [place] was triaged and scans performed at 11:30am were significant for a SAH. When the pt was tx to [facility], the pt was seen and examined in the trauma bay. The pt was A&Ox3, speaking in full sentences, and had a GCS of 15. The pt stated that he was walking and "doesn't know what happened" but fell with (+) head strike, and that he recently fell prior on April 19th. Upon examination, the pt had a right parietal abrasion, ecchymosis on the right hip, and stated that he was seeing double. The pt denied headaches and stated that he currently does not take any blood thinners, despite being prescribed them. Recently he stated that his cardiologist started him on a new medication which he said "could be the reason I fell".  (23 Apr 2024 14:00)    INTERVAL HPI  4/24 CTH: Stable, CTA: Concerns for an underlying dural AVF    OVERNIGHT EVENTS:    85 yo male seen and examined at bedside NAD. Patient denies any worsening headaches, weakness or numbness in her extremities. No changes since admission     Vital Signs Last 24 Hrs  T(C): 36.9 (24 Apr 2024 07:23), Max: 37.1 (23 Apr 2024 23:54)  T(F): 98.5 (24 Apr 2024 07:23), Max: 98.7 (23 Apr 2024 23:54)  HR: 55 (24 Apr 2024 06:00) (52 - 70)  BP: 117/55 (24 Apr 2024 06:00) (114/57 - 149/66)  BP(mean): 70 (24 Apr 2024 06:00) (70 - 88)  RR: 21 (24 Apr 2024 06:00) (18 - 26)  SpO2: 95% (24 Apr 2024 06:00) (93% - 100%)    Parameters below as of 23 Apr 2024 15:30  Patient On (Oxygen Delivery Method): room air      PHYSICAL EXAM:  GENERAL: NAD, well-groomed, well-developed  HEAD: (+) Scalp abrasion  MENTAL STATUS: AAO x3; Awake, Opens eyes spontaneously, Appropriately conversant without aphasia, following simple commands  CRANIAL NERVES: PERRLA; EOMI; No facial asymmetry; facial sensation grossly intact to light touch b/l;  tongue midline  MOTOR: strength 5/5 B/L upper and lower extremities; sensation grossly intact all extremities  SKIN: Warm, dry; no rashes or lesions      LABS:                        10.4   6.55  )-----------( 135      ( 24 Apr 2024 03:56 )             32.3     04-24    137  |  107  |  21.7<H>  ----------------------------<  104<H>  4.1   |  20.0<L>  |  0.69    Ca    7.1<L>      24 Apr 2024 03:56  Phos  3.6     04-24  Mg     2.1     04-24    TPro  4.3<L>  /  Alb  2.6<L>  /  TBili  0.6  /  DBili  0.2  /  AST  22  /  ALT  13  /  AlkPhos  52  04-23    PT/INR - ( 24 Apr 2024 03:56 )   PT: 13.7 sec;   INR: 1.24 ratio         PTT - ( 23 Apr 2024 13:34 )  PTT:26.3 sec  Urinalysis Basic - ( 24 Apr 2024 03:56 )    Color: x / Appearance: x / SG: x / pH: x  Gluc: 104 mg/dL / Ketone: x  / Bili: x / Urobili: x   Blood: x / Protein: x / Nitrite: x   Leuk Esterase: x / RBC: x / WBC x   Sq Epi: x / Non Sq Epi: x / Bacteria: x    04-23 @ 07:01  -  04-24 @ 07:00  --------------------------------------------------------  IN: 1600 mL / OUT: 1050 mL / NET: 550 mL    RADIOLOGY & ADDITIONAL TESTS:    < from: CT Angio Neck w/ IV Cont (04.23.24 @ 17:53) >  IMPRESSION:  CT head:  -Stable left perimesencephalic cistern and left superior frontal   subarachnoid hemorrhage.  -No new or worsening intracranial hemorrhage.    CT angiogram head:  -Prominent vasculature in the left perimesencephalic cistern/left   tentorium. Differential includes reactive changes due to known hemorrhage   versus underlying dural AV fistula. Recommend neurointerventional   consultation to consider conventional angiography.    CT angiogram neck:  -No vaso-occlusive disease.    _____________  NASCET criteria for internal carotid artery stenosis:  Mild: 0% to 49%  Moderate: 50% to 69%  Severe: 70% to 99%  Complete Occlusion    --- End of Report ---            MARKIE CUNNINGHAM MD;Attending Radiologist  This document has been electronically signed. Apr 23 2024  6:47PM    < end of copied text >   HPI:  Pt is 84 year old male with a PMH GEDRD, HTN, HLD, newly diagnosed a flutter, and cardiac stent (not on blood thinners) transferred from [facility] after a fall from standing height. At [facility] [place], the pt was triaged and scans performed at 11:30am were significant for a SAH. When the pt was tx to [facility], the pt was seen and examined in the trauma bay. The pt was A&Ox3, speaking in full sentences, and had a GCS of 15. The pt stated that he was walking and "doesn't know what happened" but fell with (+) head strike, and that he recently fell prior on April 19th. Upon examination, the pt had a right parietal abrasion, ecchymosis on the right hip, and stated that he was seeing double. The pt denied headaches and stated that he currently does not take any blood thinners, despite being prescribed them. Recently he stated that his cardiologist started him on a new medication which he said "could be the reason I fell".  (23 Apr 2024 14:00)    INTERVAL HPI  4/24 CTH: Stable, CTA: Concerns for an underlying dural AVF    OVERNIGHT EVENTS:    85 yo male seen and examined at bedside NAD. Patient denies any worsening headaches, weakness or numbness in her extremities. No changes since admission     Vital Signs Last 24 Hrs  T(C): 36.9 (24 Apr 2024 07:23), Max: 37.1 (23 Apr 2024 23:54)  T(F): 98.5 (24 Apr 2024 07:23), Max: 98.7 (23 Apr 2024 23:54)  HR: 55 (24 Apr 2024 06:00) (52 - 70)  BP: 117/55 (24 Apr 2024 06:00) (114/57 - 149/66)  BP(mean): 70 (24 Apr 2024 06:00) (70 - 88)  RR: 21 (24 Apr 2024 06:00) (18 - 26)  SpO2: 95% (24 Apr 2024 06:00) (93% - 100%)    Parameters below as of 23 Apr 2024 15:30  Patient On (Oxygen Delivery Method): room air      PHYSICAL EXAM:  GENERAL: NAD, well-groomed, well-developed  HEAD: (+) Scalp abrasion  MENTAL STATUS: AAO x3; Awake, Opens eyes spontaneously, Appropriately conversant without aphasia, following simple commands  CRANIAL NERVES: PERRLA; EOMI; No facial asymmetry; facial sensation grossly intact to light touch b/l;  tongue midline  MOTOR: strength 5/5 B/L upper and lower extremities; sensation grossly intact all extremities  SKIN: Warm, dry; no rashes or lesions      LABS:                        10.4   6.55  )-----------( 135      ( 24 Apr 2024 03:56 )             32.3     04-24    137  |  107  |  21.7<H>  ----------------------------<  104<H>  4.1   |  20.0<L>  |  0.69    Ca    7.1<L>      24 Apr 2024 03:56  Phos  3.6     04-24  Mg     2.1     04-24    TPro  4.3<L>  /  Alb  2.6<L>  /  TBili  0.6  /  DBili  0.2  /  AST  22  /  ALT  13  /  AlkPhos  52  04-23    PT/INR - ( 24 Apr 2024 03:56 )   PT: 13.7 sec;   INR: 1.24 ratio         PTT - ( 23 Apr 2024 13:34 )  PTT:26.3 sec  Urinalysis Basic - ( 24 Apr 2024 03:56 )    Color: x / Appearance: x / SG: x / pH: x  Gluc: 104 mg/dL / Ketone: x  / Bili: x / Urobili: x   Blood: x / Protein: x / Nitrite: x   Leuk Esterase: x / RBC: x / WBC x   Sq Epi: x / Non Sq Epi: x / Bacteria: x    04-23 @ 07:01  -  04-24 @ 07:00  --------------------------------------------------------  IN: 1600 mL / OUT: 1050 mL / NET: 550 mL    RADIOLOGY & ADDITIONAL TESTS:    < from: CT Angio Neck w/ IV Cont (04.23.24 @ 17:53) >  IMPRESSION:  CT head:  -Stable left perimesencephalic cistern and left superior frontal   subarachnoid hemorrhage.  -No new or worsening intracranial hemorrhage.    CT angiogram head:  -Prominent vasculature in the left perimesencephalic cistern/left   tentorium. Differential includes reactive changes due to known hemorrhage   versus underlying dural AV fistula. Recommend neurointerventional   consultation to consider conventional angiography.    CT angiogram neck:  -No vaso-occlusive disease.    _____________  NASCET criteria for internal carotid artery stenosis:  Mild: 0% to 49%  Moderate: 50% to 69%  Severe: 70% to 99%  Complete Occlusion    --- End of Report ---    MARKIE CUNNINGHAM MD;Attending Radiologist  This document has been electronically signed. Apr 23 2024  6:47PM    < end of copied text >

## 2024-04-24 NOTE — CONSULT NOTE ADULT - TIME BILLING
Time spent reviewing the chart documentation, reviewing labs and imaging studies, evaluating the patient, discussing the plan of care with the treatment team, and documenting.
Aflutter with pauses, SAH, stable CAD

## 2024-04-24 NOTE — CHART NOTE - NSCHARTNOTEFT_GEN_A_CORE
Tertiary Trauma Survey (TTS)    Date of TTS: 04-24-24 @ 09:49                             Admit Date: 04-23-24 @ 13:59      Trauma Activation:    List Injuries Identified to Date:  1. Subarachnoid Hemorrhage         List Operative and Interventional Radiological Procedures:           Physical Exam:  Vital Signs:      Primary Survey:    A - airway intact  B - bilateral breath sounds and good chest rise  C - palpable pulses in all extremities  D - GCS 15 on arrival, MURO  Exposure obtained    Secondary Survey:   General: NAD  HEENT: Normocephalic, atraumatic, EOMI, PEERL. no scalp lacerations   Neck: Soft, midline trachea. no cspine tenderness  Chest: No chest wall tenderness. or subq  emphysema   Cardiac: S1, S2, RRR  Respiratory: Bilateral breath sounds, clear and equal bilaterally  Abdomen: Soft, non-distended, non-tender, no rebound,   Groin: Normal appearing, pelvis stable   Ext: palp radial b/l UE, b/l DP palp in Lower Extrem.   Back: no TTP, no palpable runoff/stepoff/deformity  Rectal: No loyda blood, DAVID with good tone    FAST      RADIOLOGICAL FINDINGS REVIEW:    INCIDENTAL FINDINGS:    [ ] No    [ ] Yes, Findings are:        [ ] Tertiary exam complete, there are no new injuries identified    [ ] Tertiary exam done, new injuries identified are:                [ ] Imaging ordered: Tertiary Trauma Survey (TTS)    Date of TTS: 04-24-24 @ 09:49                             Admit Date: 04-23-24 @ 13:59      Trauma Activation:    List Injuries Identified to Date:  1. Subarachnoid Hemorrhage   2.  Reactive changes due to known hemorrhage vs underlying dural AV fistula        List Operative and Interventional Radiological Procedures:           Physical Exam:  Vital Signs:      Primary Survey:    A - airway intact  B - bilateral breath sounds and good chest rise  C - palpable pulses in all extremities  D - GCS 15 on arrival, MURO  Exposure obtained    Secondary Survey:   General: NAD  HEENT: +Diplopia. Normocephalic, atraumatic, EOMI, PEERL. Skin cancer s/p excision on forehead.   Neck: Soft, midline trachea. no cspine tenderness  Chest: No chest wall tenderness. or subq  emphysema   Cardiac: S1, S2, RRR  Respiratory: Bilateral breath sounds, clear and equal bilaterally  Abdomen: Soft, non-distended, non-tender, no rebound,   Groin: Normal appearing, pelvis stable   Ext: palp radial b/l UE, b/l DP palp in Lower Extrem.   Back: no TTP, no palpable runoff/stepoff/deformity. + Extensive ecchymosis over lumbo-sacral area, no point tenderness.       RADIOLOGICAL FINDINGS REVIEW:    4/23/24 CT Chest / Abdomen / Pelvis (11:33):  IMPRESSION:  1. No acute intra-thoracoabdominal injury or displaced fracture of included axial skeleton.  2. Subcutaneous RIGHT buttock infiltration / bruising    4/23/24 CT Cervical Spine:  CERVICAL SPINE CT: No evidence of an acute cervical spine fracture.    < from: CT Head No Cont (04.23.24 @ 17:51) >    IMPRESSION:  CT head:  -Stable left perimesencephalic cistern and left superior frontal   subarachnoid hemorrhage.  -No new or worsening intracranial hemorrhage.    CT angiogram head:  -Prominent vasculature in the left perimesencephalic cistern/left   tentorium. Differential includes reactive changes due to known hemorrhage   versus underlying dural AV fistula. Recommend neurointerventional   consultation to consider conventional angiography.    CT angiogram neck:  -No vaso-occlusive disease.    < end of copied text >        INCIDENTAL FINDINGS:    [x] No    [ ] Yes, Findings are:        [x] Tertiary exam complete, there are no new injuries identified    [ ] Tertiary exam done, new injuries identified are:                [ ] Imaging ordered:

## 2024-04-24 NOTE — CHART NOTE - NSCHARTNOTEFT_GEN_A_CORE
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 1000 milliGRAM(s) IV Intermittent every 6 hours PRN  atorvastatin 40 milliGRAM(s) Oral at bedtime  chlorhexidine 2% Cloths 1 Application(s) Topical daily  famotidine    Tablet 20 milliGRAM(s) Oral daily  influenza  Vaccine (HIGH DOSE) 0.7 milliLiter(s) IntraMuscular once  multiple electrolytes Injection Type 1 1000 milliLiter(s) IV Continuous <Continuous>  tamsulosin 0.4 milliGRAM(s) Oral at bedtime      [x] I attest I have reviewed and reconciled all medications prior to transfer    IV Fluids  Plasma-Lyte A, 1000 milliLiter(s) infuse at 100 mL/Hr    Indication: maintenance     Antibiotics: none        I have discussed this case with the ACS team upon transfer and all questions regarding ICU course were answered.  The following items are to be followed up:    - Pain management  - PT/OT  - Continue with SCD's, holding chemical DVT prophylaxis for now  - Pending MRI head w/wo contrast  - F/u formal echo  - Q1 neurochecks  - Cardiology: avoid AV mariela blockers. Resume aspirin once cleared. Continue statins. Pending EP cs for possible pacemaker vs ILR placement. Cards/EP to follow. Recs appreciated.  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 1000 milliGRAM(s) IV Intermittent every 6 hours PRN  atorvastatin 40 milliGRAM(s) Oral at bedtime  chlorhexidine 2% Cloths 1 Application(s) Topical daily  famotidine    Tablet 20 milliGRAM(s) Oral daily  influenza  Vaccine (HIGH DOSE) 0.7 milliLiter(s) IntraMuscular once  multiple electrolytes Injection Type 1 1000 milliLiter(s) IV Continuous <Continuous>  tamsulosin 0.4 milliGRAM(s) Oral at bedtime      [x] I attest I have reviewed and reconciled all medications prior to transfer    IV Fluids  Plasma-Lyte A, 1000 milliLiter(s) infuse at 100 mL/Hr    Indication: maintenance     Antibiotics: none        I have discussed this case with the NSICU team upon transfer and all questions regarding ICU course were answered.  The following items are to be followed up:    - Pain management  - PT/OT  - Continue with SCD's, holding chemical DVT prophylaxis for now  - Pending MRI head w/wo contrast  - F/u formal echo  - Q1 neurochecks  - Cardiology: avoid AV mariela blockers. Resume aspirin once cleared. Continue statins. Pending EP cs for possible pacemaker vs ILR placement. Cards/EP to follow. Recs appreciated.  - Continue home meds  - Regular diet

## 2024-04-24 NOTE — PROGRESS NOTE ADULT - SUBJECTIVE AND OBJECTIVE BOX
SICU Attending Progress Note    24H Events:  Patient had episodes of bradycardia yesterday down to the 30s and 40s but blood pressure remained within normal limits. CTA head and neck showed a possible AV fistula requiring MRI to confirm as per neurosurgery. Patient continues to complain of diplopia. Neurosurgery aware. Otherwise, patient is out of bed to chair this morning.       acetaminophen   IVPB .. 1000 milliGRAM(s) IV Intermittent every 6 hours PRN  atorvastatin 40 milliGRAM(s) Oral at bedtime  chlorhexidine 2% Cloths 1 Application(s) Topical daily  famotidine    Tablet 20 milliGRAM(s) Oral daily  influenza  Vaccine (HIGH DOSE) 0.7 milliLiter(s) IntraMuscular once  multiple electrolytes Injection Type 1 1000 milliLiter(s) IV Continuous <Continuous>  tamsulosin 0.4 milliGRAM(s) Oral at bedtime    T(C): 37 (04-24-24 @ 11:11), Max: 37.1 (04-23-24 @ 23:54)  HR: 67 (04-24-24 @ 12:00) (52 - 70)  BP: 147/72 (04-24-24 @ 12:00) (114/57 - 149/66)  RR: 19 (04-24-24 @ 12:00) (15 - 26)  SpO2: 95% (04-24-24 @ 12:00) (93% - 100%)      04-23-24 @ 07:01  -  04-24-24 @ 07:00  --------------------------------------------------------  IN: 1600 mL / OUT: 1050 mL / NET: 550 mL    04-24-24 @ 07:01  -  04-24-24 @ 12:52  --------------------------------------------------------  IN: 600 mL / OUT: 500 mL / NET: 100 mL        PHYSICAL EXAM:    Gen: NAD    Neurological: AAOx3, diplopia    Pulmonary: no respiratory distress    Cardiovascular: bradycardia with irregular rhythm    Gastrointestinal: soft, non distended, non tender to palpation        Assesment:84[place] [facility] [place]H      Plan:    Neuro: NAD, f/u repeat CT head  Pulm: No respiratory distress  Card: Patient continues to have episodes of bradycardia, cardiology consulted, recs appreciated, f/u EP consult, continue to hold home beta blockers, f/u formal ECHO  GI: Restart diet today, monitor tolerance  Endo: continue glycemic monitoring and control  Renal: monitor urine output  ID:  no leukocytosis, no abx, afebrile  Heme: H/H stable, continue to hold chemical DVT ppx as per NSG, SCDs for DVT ppx    Dispo/Code Status: potential transfer to NSICU today      Patient required critical care management and is at risk for life threatening decompensation  I provided ________of non-continuous care to the patient.     SICU Attending Progress Note    24H Events:  Patient had episodes of bradycardia yesterday down to the 30s and 40s but blood pressure remained within normal limits. CTA head and neck showed a possible AV fistula requiring MRI to confirm as per neurosurgery. Patient continues to complain of diplopia. Neurosurgery aware. Otherwise, patient is out of bed to chair this morning.       acetaminophen   IVPB .. 1000 milliGRAM(s) IV Intermittent every 6 hours PRN  atorvastatin 40 milliGRAM(s) Oral at bedtime  chlorhexidine 2% Cloths 1 Application(s) Topical daily  famotidine    Tablet 20 milliGRAM(s) Oral daily  influenza  Vaccine (HIGH DOSE) 0.7 milliLiter(s) IntraMuscular once  multiple electrolytes Injection Type 1 1000 milliLiter(s) IV Continuous <Continuous>  tamsulosin 0.4 milliGRAM(s) Oral at bedtime    T(C): 37 (04-24-24 @ 11:11), Max: 37.1 (04-23-24 @ 23:54)  HR: 67 (04-24-24 @ 12:00) (52 - 70)  BP: 147/72 (04-24-24 @ 12:00) (114/57 - 149/66)  RR: 19 (04-24-24 @ 12:00) (15 - 26)  SpO2: 95% (04-24-24 @ 12:00) (93% - 100%)      04-23-24 @ 07:01  -  04-24-24 @ 07:00  --------------------------------------------------------  IN: 1600 mL / OUT: 1050 mL / NET: 550 mL    04-24-24 @ 07:01  -  04-24-24 @ 12:52  --------------------------------------------------------  IN: 600 mL / OUT: 500 mL / NET: 100 mL        PHYSICAL EXAM:    Gen: NAD    Neurological: AAOx3, diplopia    Pulmonary: no respiratory distress    Cardiovascular: bradycardia with irregular rhythm    Gastrointestinal: soft, non distended, non tender to palpation        Assesment:84yMale transferred from [facility] [place]/p fall with SAH      Plan:    Neuro: NAD, f/u repeat CT head, MRI head today for possible AV fistula as per NSG  Pulm: No respiratory distress  Card: Patient continues to have episodes of bradycardia, cardiology consulted, recs appreciated, f/u EP consult, continue to hold home beta blockers, f/u formal ECHO  GI: Restart diet today, monitor tolerance  Endo: continue glycemic monitoring and control  Renal: monitor urine output  ID:  no leukocytosis, no abx, afebrile  Heme: H/H stable, continue to hold chemical DVT ppx as per NSG, SCDs for DVT ppx    Dispo/Code Status: potential transfer to NSICU today

## 2024-04-24 NOTE — CONSULT NOTE ADULT - ASSESSMENT
84 year old male with a PMH gerd, HTN, HLD, newly diagnosed a flutter, CAD (PCI LAD 12/22) [place] [facility] [place] from standing height. States fell two times, first time recalls feeling a hot flash, grabbed chair and then fell. 2nd fall patient does not recall. Patient does not know if he passed out. Denies other syncopal episodes. Denies chest pain shortness of breath. [facility] PCP diagnosed aflutter, within past week, has not seen cardiologist yet. [facility] takes Asa, taken off brilinta few months ago, recently doseages of metoprolol changed, but can not recall if he took new dose or not.

## 2024-04-24 NOTE — CONSULT NOTE ADULT - SUBJECTIVE AND OBJECTIVE BOX
CARDIAC ELECTROPHYSIOLOGY  [facility]   Office: [address] [place] [address]  Telephone: [phone] Fax:[phone]    HPI  84 year old male with GERD, HTN, HLD, CAD (PCI with JIMMIE to LAD 12/22), and newly diagnosed a flutter.  Pt had a fall at home on about a week ago.  Pt states that he recalls feeling a hot flash, grabbing for a chair and then falling to the floor.  Pt then proceeded to [facility] (urgent care) where an EKG revealed that he was in new onset Atrial flutter.  Pt was sent to [facility], where he was seen by Dr Olivier Hanson (Cardiology).  By the time pt arrived to [facility] pt was back in SR but was recommended to be started on Eliquis 5mg bid and Toprol 25mg daily and ultimately discharged home.  Pt did not start the eliquis due to concerns of bleeding risk and only took 2 dose of the Toprol.  Pt then had another fall from standing height which he does not recall how he ended up on the floor on 4/23/24.  Pt was brought to [facility] [place] where he was found to have a SAH and for transferred to [facility] to be seen and managed by Neurosurgery.  EP now consulted as pt has been in Atrial Flutter with pauses on telemetry monitor.                     Pt denies ever having syncopal episodes before and denies ever experiencing any chest pain, shortness of breath, palpitations or other symptoms.  Pt does also state that he previously was on metoprolol but was taken off of it but does not know why it was discontinued    Telemetry- Atrial flutter ~60bpm with frequent pauses most under 3 secs (longest episode 4.6 secs @ 3:00am during sleeping hours), and 3 beats of NSVT.     Cardiology Summary     EKG 8/24/24 @ 08:00 - typical Atrial Flutter    TTE 12/7/2022 - EF 72%, Trace MR, Trace AI        PAST MEDICAL & SURGICAL HISTORY:  as above     REVIEW OF SYSTEMS:    CONSTITUTIONAL: No fever, weight loss, or fatigue  EYES: No visual disturbances  NECK: No pain or stiffness  RESPIRATORY: No cough, wheezing, chills or hemoptysis; No shortness of breath  CARDIOVASCULAR: see HPI  GASTROINTESTINAL: No abdominal or epigastric pain. No nausea, vomiting, or hematemesis; No diarrhea or constipation. No melena or hematochezia.  NEUROLOGICAL: No headaches, memory loss, loss of strength, numbness, or tremors  SKIN: No itching, burning, rashes, or lesions   LYMPH NODES: No enlarged glands  ENDOCRINE: No heat or cold intolerance; No hair loss  PSYCHIATRIC: No depression, anxiety, mood swings, or difficulty sleeping  HEME/LYMPH: No easy bruising, or bleeding gums      MEDICATIONS  (STANDING):  atorvastatin 40 milliGRAM(s) Oral at bedtime  chlorhexidine 2% Cloths 1 Application(s) Topical daily  famotidine    Tablet 20 milliGRAM(s) Oral daily  influenza  Vaccine (HIGH DOSE) 0.7 milliLiter(s) IntraMuscular once  multiple electrolytes Injection Type 1 1000 milliLiter(s) (100 mL/Hr) IV Continuous <Continuous>  tamsulosin 0.4 milliGRAM(s) Oral at bedtime    MEDICATIONS  (PRN):  acetaminophen   IVPB .. 1000 milliGRAM(s) IV Intermittent every 6 hours PRN Temp greater or equal to 38C (100.4F), Mild Pain (1 - 3)      Allergies  Allergy Status Unknown      SOCIAL HISTORY:  Tobacco use: denies  Alcohol use: denies  Illicit drug use: denies  Caffine use: 2 cups of coffee daily      FAMILY HISTORY:  no family history of any heart disease     Vital Signs Last 24 Hrs  T(C): 37 (24 Apr 2024 11:11), Max: 37.1 (23 Apr 2024 23:54)  T(F): 98.6 (24 Apr 2024 11:11), Max: 98.7 (23 Apr 2024 23:54)  HR: 67 (24 Apr 2024 12:00) (52 - 70)  BP: 147/72 (24 Apr 2024 12:00) (114/57 - 149/66)  BP(mean): 94 (24 Apr 2024 12:00) (68 - 94)  RR: 19 (24 Apr 2024 12:00) (15 - 26)  SpO2: 95% (24 Apr 2024 12:00) (93% - 100%)    Parameters below as of 24 Apr 2024 08:00  Patient On (Oxygen Delivery Method): room air        Physical Exam:  Constitutional: AAOx3, NAD  Cardiovascular: +S1S2 no murmurs, rubs, gallops   Pulmonary: CTA b/l, unlabored, no wheezes, rales. rhonci  Abdomen: soft NTND  Extremities: no edema b/l, +distal pulses b/l  Neuro: non focal, speech clear, MURO x 4    LABS:                        10.4   6.55  )-----------( 135      ( 24 Apr 2024 03:56 )             32.3   04-24    137  |  107  |  21.7<H>  ----------------------------<  104<H>  4.1   |  20.0<L>  |  0.69    Ca    7.1<L>      24 Apr 2024 03:56  Phos  3.6     04-24  Mg     2.1     04-24    TPro  4.3<L>  /  Alb  2.6<L>  /  TBili  0.6  /  DBili  0.2  /  AST  22  /  ALT  13  /  AlkPhos  52  04-23  LIVER FUNCTIONS - ( 23 Apr 2024 17:00 )  Alb: 2.6 g/dL / Pro: 4.3 g/dL / ALK PHOS: 52 U/L / ALT: 13 U/L / AST: 22 U/L / GGT: x           PT/INR - ( 24 Apr 2024 03:56 )   PT: 13.7 sec;   INR: 1.24 ratio         PTT - ( 23 Apr 2024 13:34 )  PTT:26.3 secCARDIAC MARKERS ( 23 Apr 2024 13:34 )  x     / x     / 185 U/L / x     / 4.7 ng/mL      Urinalysis Basic - ( 24 Apr 2024 03:56 )    Color: x / Appearance: x / SG: x / pH: x  Gluc: 104 mg/dL / Ketone: x  / Bili: x / Urobili: x   Blood: x / Protein: x / Nitrite: x   Leuk Esterase: x / RBC: x / WBC x   Sq Epi: x / Non Sq Epi: x / Bacteria: x         CARDIAC ELECTROPHYSIOLOGY  [facility]   Office: [address] [address]  Telephone: [phone] Fax:[phone]    HPI  84 year old male with GERD, HTN, HLD, CAD (PCI with JIMMIE to LAD 12/22), and newly diagnosed a flutter.  Pt had a fall at home on about a week ago.  Pt states that he recalls feeling a hot flash, grabbing for a chair and then falling to the floor.  Pt then proceeded to [facility] (urgent care) where an EKG revealed that he was in new onset Atrial flutter.  Pt was sent to [facility], where he was seen by Dr Olivier Hanson (Cardiology).  By the time pt arrived to [facility] pt was back in SR but was recommended to be started on Eliquis 5mg bid and Toprol 25mg daily and ultimately discharged home.  Pt did not start the eliquis due to concerns of bleeding risk and only took 2 dose of the Toprol.  Pt then had another fall from standing height which he does not recall how he ended up on the floor on 4/23/24.  Pt was brought to [facility] [place] where he was found to have a SAH and was transferred to [facility] to be seen and managed by Neurosurgery.  EP now consulted as pt has been in Atrial Flutter with pauses on telemetry monitor.                     Pt denies ever having syncopal episodes before and denies ever experiencing any chest pain, shortness of breath, palpitations or other symptoms.  Pt does also state that he previously was on metoprolol years ago but was taken off of it but does not know why it was discontinued.    Telemetry- Atrial flutter ~60bpm with frequent pauses most under 3 secs (longest episode 4.6 secs @ 3:00am during sleeping hours), and 3 beats of NSVT.     Cardiology Summary     EKG 8/24/24 @ 08:00 - typical Atrial Flutter    TTE 12/7/2022 - EF 72%, Trace MR, Trace AI        PAST MEDICAL & SURGICAL HISTORY:  as above     REVIEW OF SYSTEMS:    CONSTITUTIONAL: No fever, weight loss, or fatigue  EYES: No visual disturbances  NECK: No pain or stiffness  RESPIRATORY: No cough, wheezing, chills or hemoptysis; No shortness of breath  CARDIOVASCULAR: see HPI  GASTROINTESTINAL: No abdominal or epigastric pain. No nausea, vomiting, or hematemesis; No diarrhea or constipation. No melena or hematochezia.  NEUROLOGICAL: No headaches, memory loss, loss of strength, numbness, or tremors  SKIN: No itching, burning, rashes, or lesions   LYMPH NODES: No enlarged glands  ENDOCRINE: No heat or cold intolerance; No hair loss  PSYCHIATRIC: No depression, anxiety, mood swings, or difficulty sleeping  HEME/LYMPH: No easy bruising, or bleeding gums      MEDICATIONS  (STANDING):  atorvastatin 40 milliGRAM(s) Oral at bedtime  chlorhexidine 2% Cloths 1 Application(s) Topical daily  famotidine    Tablet 20 milliGRAM(s) Oral daily  influenza  Vaccine (HIGH DOSE) 0.7 milliLiter(s) IntraMuscular once  multiple electrolytes Injection Type 1 1000 milliLiter(s) (100 mL/Hr) IV Continuous <Continuous>  tamsulosin 0.4 milliGRAM(s) Oral at bedtime    MEDICATIONS  (PRN):  acetaminophen   IVPB .. 1000 milliGRAM(s) IV Intermittent every 6 hours PRN Temp greater or equal to 38C (100.4F), Mild Pain (1 - 3)      Allergies  Allergy Status Unknown      SOCIAL HISTORY:  Tobacco use: denies  Alcohol use: denies  Illicit drug use: denies  Caffine use: 2 cups of coffee daily      FAMILY HISTORY:  no family history of any heart disease     Vital Signs Last 24 Hrs  T(C): 37 (24 Apr 2024 11:11), Max: 37.1 (23 Apr 2024 23:54)  T(F): 98.6 (24 Apr 2024 11:11), Max: 98.7 (23 Apr 2024 23:54)  HR: 67 (24 Apr 2024 12:00) (52 - 70)  BP: 147/72 (24 Apr 2024 12:00) (114/57 - 149/66)  BP(mean): 94 (24 Apr 2024 12:00) (68 - 94)  RR: 19 (24 Apr 2024 12:00) (15 - 26)  SpO2: 95% (24 Apr 2024 12:00) (93% - 100%)    Parameters below as of 24 Apr 2024 08:00  Patient On (Oxygen Delivery Method): room air        Physical Exam:  Constitutional: AAOx3, NAD  Cardiovascular: +S1S2 no murmurs, rubs, gallops   Pulmonary: CTA b/l, unlabored, no wheezes, rales. rhonci  Abdomen: soft NTND  Extremities: no edema b/l, +distal pulses b/l  Neuro: non focal, speech clear, MURO x 4    LABS:                        10.4   6.55  )-----------( 135      ( 24 Apr 2024 03:56 )             32.3   04-24    137  |  107  |  21.7<H>  ----------------------------<  104<H>  4.1   |  20.0<L>  |  0.69    Ca    7.1<L>      24 Apr 2024 03:56  Phos  3.6     04-24  Mg     2.1     04-24    TPro  4.3<L>  /  Alb  2.6<L>  /  TBili  0.6  /  DBili  0.2  /  AST  22  /  ALT  13  /  AlkPhos  52  04-23  LIVER FUNCTIONS - ( 23 Apr 2024 17:00 )  Alb: 2.6 g/dL / Pro: 4.3 g/dL / ALK PHOS: 52 U/L / ALT: 13 U/L / AST: 22 U/L / GGT: x           PT/INR - ( 24 Apr 2024 03:56 )   PT: 13.7 sec;   INR: 1.24 ratio         PTT - ( 23 Apr 2024 13:34 )  PTT:26.3 secCARDIAC MARKERS ( 23 Apr 2024 13:34 )  x     / x     / 185 U/L / x     / 4.7 ng/mL      Urinalysis Basic - ( 24 Apr 2024 03:56 )    Color: x / Appearance: x / SG: x / pH: x  Gluc: 104 mg/dL / Ketone: x  / Bili: x / Urobili: x   Blood: x / Protein: x / Nitrite: x   Leuk Esterase: x / RBC: x / WBC x   Sq Epi: x / Non Sq Epi: x / Bacteria: x      A/P  84 year old male with GERD, HTN, HLD, CAD (PCI with JIMMIE to LAD 12/22), newly diagnosed a flutter (not on AC) and now with SAH.  Pt with 2 recent falls within past 2 weeks, with at least one being related to a syncopal episode, new onset Atrial flutter diagnosed about a week and a half ago on EKG at urgent care center and now with SAH after the most recent fall.   EP consulted as pt has been in Atrial Flutter with pauses on telemetry monitor since arriving at [facility].   Of note pt had been started on Toprol 25mg daily for which he took only 2 doses but does not know when the last dose was.  Pt does also state that he previously was on metoprolol years ago but was taken off of it but does not know why it was discontinued.  Pt also states that he was prescribed Eliquis after being diagnosed with Atrial flutter but did never took it because of concern for bleeding risk.    Telemetry- Atrial flutter ~60bpm with frequent pauses most under 3 secs (longest episode 4.6 secs @ 3:00am during sleeping hours), and 3 beats of NSVT.      EKG 8/24/24 @ 08:00 - typical Atrial Flutter    Recommendations  -TTE  -continue telemetry monitoring  -no indication for PPM at this time as pt is in Atrial flutter and pauses are all less then 5secs  -pt would benefit from MERARY ablation of atrial flutter but would need to be cleared to start AC from neurosurgery standpoint first  -would hold all AV mariela blockers at this time  -ILR at discharge  -Case d/w Dr Pratt, Further recommendations to follow CARDIAC ELECTROPHYSIOLOGY  [facility]   Office: [address] [address]  Telephone: [phone] Fax:[phone]    HPI  84 year old male with GERD, HTN, HLD, CAD (PCI with JIMMIE to LAD 12/22), and newly diagnosed a flutter.  Pt had a fall at home on about a week ago.  Pt states that he recalls feeling a hot flash, grabbing for a chair and then falling to the floor.  Pt then proceeded to [facility] (urgent care) where an EKG revealed that he was in new onset Atrial flutter.  Pt was sent to [facility], where he was seen by Dr Olivier Hanson (Cardiology).  By the time pt arrived to [facility] [place] pt was back in SR but was recommended to be started on Eliquis 5mg bid and Toprol 25mg daily and ultimately discharged home.  Pt did not start the eliquis due to concerns of bleeding risk and only took 2 dose of the Toprol.  Pt then had another fall from standing height which he does not recall how he ended up on the floor on 4/23/24.  Pt was brought to [facility] where he was found to have a SAH and was transferred to [facility] to be seen and managed by Neurosurgery.  EP now consulted as pt has been in Atrial Flutter with pauses on telemetry monitor.                     Pt denies ever having syncopal episodes before and denies ever experiencing any chest pain, shortness of breath, palpitations or other symptoms.  Pt does also state that he previously was on metoprolol years ago but was taken off of it but does not know why it was discontinued.    Telemetry- Atrial flutter ~60bpm with frequent pauses most under 3 secs (longest episode 4.6 secs @ 3:00am during sleeping hours), and 3 beats of NSVT.     Cardiology Summary     EKG 8/24/24 @ 08:00 - typical Atrial Flutter    TTE 12/7/2022 - EF 72%, Trace MR, Trace AI        PAST MEDICAL & SURGICAL HISTORY:  as above     REVIEW OF SYSTEMS:    CONSTITUTIONAL: No fever, weight loss, or fatigue  EYES: No visual disturbances  NECK: No pain or stiffness  RESPIRATORY: No cough, wheezing, chills or hemoptysis; No shortness of breath  CARDIOVASCULAR: see HPI  GASTROINTESTINAL: No abdominal or epigastric pain. No nausea, vomiting, or hematemesis; No diarrhea or constipation. No melena or hematochezia.  NEUROLOGICAL: No headaches, memory loss, loss of strength, numbness, or tremors  SKIN: No itching, burning, rashes, or lesions   LYMPH NODES: No enlarged glands  ENDOCRINE: No heat or cold intolerance; No hair loss  PSYCHIATRIC: No depression, anxiety, mood swings, or difficulty sleeping  HEME/LYMPH: No easy bruising, or bleeding gums      MEDICATIONS  (STANDING):  atorvastatin 40 milliGRAM(s) Oral at bedtime  chlorhexidine 2% Cloths 1 Application(s) Topical daily  famotidine    Tablet 20 milliGRAM(s) Oral daily  influenza  Vaccine (HIGH DOSE) 0.7 milliLiter(s) IntraMuscular once  multiple electrolytes Injection Type 1 1000 milliLiter(s) (100 mL/Hr) IV Continuous <Continuous>  tamsulosin 0.4 milliGRAM(s) Oral at bedtime    MEDICATIONS  (PRN):  acetaminophen   IVPB .. 1000 milliGRAM(s) IV Intermittent every 6 hours PRN Temp greater or equal to 38C (100.4F), Mild Pain (1 - 3)      Allergies  Allergy Status Unknown      SOCIAL HISTORY:  Tobacco use: denies  Alcohol use: denies  Illicit drug use: denies  Caffine use: 2 cups of coffee daily      FAMILY HISTORY:  no family history of any heart disease     Vital Signs Last 24 Hrs  T(C): 37 (24 Apr 2024 11:11), Max: 37.1 (23 Apr 2024 23:54)  T(F): 98.6 (24 Apr 2024 11:11), Max: 98.7 (23 Apr 2024 23:54)  HR: 67 (24 Apr 2024 12:00) (52 - 70)  BP: 147/72 (24 Apr 2024 12:00) (114/57 - 149/66)  BP(mean): 94 (24 Apr 2024 12:00) (68 - 94)  RR: 19 (24 Apr 2024 12:00) (15 - 26)  SpO2: 95% (24 Apr 2024 12:00) (93% - 100%)    Parameters below as of 24 Apr 2024 08:00  Patient On (Oxygen Delivery Method): room air        Physical Exam:  Constitutional: AAOx3, NAD  Cardiovascular: +S1S2 no murmurs, rubs, gallops   Pulmonary: CTA b/l, unlabored, no wheezes, rales. rhonci  Abdomen: soft NTND  Extremities: no edema b/l, +distal pulses b/l  Neuro: non focal, speech clear, MURO x 4    LABS:                        10.4   6.55  )-----------( 135      ( 24 Apr 2024 03:56 )             32.3   04-24    137  |  107  |  21.7<H>  ----------------------------<  104<H>  4.1   |  20.0<L>  |  0.69    Ca    7.1<L>      24 Apr 2024 03:56  Phos  3.6     04-24  Mg     2.1     04-24    TPro  4.3<L>  /  Alb  2.6<L>  /  TBili  0.6  /  DBili  0.2  /  AST  22  /  ALT  13  /  AlkPhos  52  04-23  LIVER FUNCTIONS - ( 23 Apr 2024 17:00 )  Alb: 2.6 g/dL / Pro: 4.3 g/dL / ALK PHOS: 52 U/L / ALT: 13 U/L / AST: 22 U/L / GGT: x           PT/INR - ( 24 Apr 2024 03:56 )   PT: 13.7 sec;   INR: 1.24 ratio         PTT - ( 23 Apr 2024 13:34 )  PTT:26.3 secCARDIAC MARKERS ( 23 Apr 2024 13:34 )  x     / x     / 185 U/L / x     / 4.7 ng/mL      Urinalysis Basic - ( 24 Apr 2024 03:56 )    Color: x / Appearance: x / SG: x / pH: x  Gluc: 104 mg/dL / Ketone: x  / Bili: x / Urobili: x   Blood: x / Protein: x / Nitrite: x   Leuk Esterase: x / RBC: x / WBC x   Sq Epi: x / Non Sq Epi: x / Bacteria: x

## 2024-04-24 NOTE — CONSULT NOTE ADULT - SUBJECTIVE AND OBJECTIVE BOX
George Todd MD, MBA ([facility] Hospitalist)    PMD: Dr. Brandan Ribera    HPI:  84 year old male with a PMH GERD, HTN, HLD, recently diagnosed A flutter (never started Eliquis due to fear of side-effects), and cardiac stent 1.5 years ago, was transferred from [facility] after a fall from standing height. At [facility], the pt was triaged and scans performed at 11:30am were significant for a SAH. When the pt was tx to [facility], the pt was seen and examined in the trauma bay. The pt was A&Ox3, speaking in full sentences, and had a GCS of 15. The pt stated that he was walking and "doesn't know what happened" but fell with (+) head strike, and that he recently fell prior on April 19th. Upon examination, the pt had a right parietal abrasion, ecchymosis on the right hip, and stated that he was seeing double.   [facility] consulted for comanagement.     PAST MEDICAL & SURGICAL HISTORY:  GERD  HTN  HLD    REVIEW OF SYMPTOMS:  Besides HPI, no additional +ROS    ALLERGIES:  Allergy Status Unknown    SOCIAL HISTORY:   , lives with spouse  Functionally independent at baseline  Denies smoking, alcohol, and illicit drug use    FAMILY HISTORY:  N/A    HOME MEDICATIONS:  ASA 81 daily  lipitor 40 qHS  ezetimbine 10 daily  metoprolol 25mg ER daily  olmesartan 20mg daily  flomax 0.4 qHS    MEDICATIONS  (STANDING):  atorvastatin 40 milliGRAM(s) Oral at bedtime  chlorhexidine 2% Cloths 1 Application(s) Topical daily  famotidine    Tablet 20 milliGRAM(s) Oral daily  influenza  Vaccine (HIGH DOSE) 0.7 milliLiter(s) IntraMuscular once  multiple electrolytes Injection Type 1 1000 milliLiter(s) (100 mL/Hr) IV Continuous <Continuous>  tamsulosin 0.4 milliGRAM(s) Oral at bedtime    MEDICATIONS  (PRN):  acetaminophen   IVPB .. 1000 milliGRAM(s) IV Intermittent every 6 hours PRN Temp greater or equal to 38C (100.4F), Mild Pain (1 - 3)      Vital Signs Last 24 Hrs  T(C): 37 (24 Apr 2024 11:11), Max: 37.1 (23 Apr 2024 23:54)  T(F): 98.6 (24 Apr 2024 11:11), Max: 98.7 (23 Apr 2024 23:54)  HR: 67 (24 Apr 2024 11:00) (52 - 70)  BP: 123/59 (24 Apr 2024 11:00) (114/57 - 149/66)  BP(mean): 76 (24 Apr 2024 11:00) (68 - 88)  RR: 19 (24 Apr 2024 11:00) (15 - 26)  SpO2: 98% (24 Apr 2024 11:00) (93% - 100%)    Parameters below as of 24 Apr 2024 08:00  Patient On (Oxygen Delivery Method): room air      CAPILLARY BLOOD GLUCOSE        I&O's Summary    23 Apr 2024 07:01  -  24 Apr 2024 07:00  --------------------------------------------------------  IN: 1600 mL / OUT: 1050 mL / NET: 550 mL    24 Apr 2024 07:01  -  24 Apr 2024 12:02  --------------------------------------------------------  IN: 500 mL / OUT: 300 mL / NET: 200 mL        PHYSICAL EXAM:  GENERAL: elderly man sitting in the chair, NAD  HEENT:  MMM, neck supple, no JVD  EYES: EOMI, conjunctiva and sclera clear  CHEST/LUNG: Clear to auscultation bilaterally; No wheeze, rhonchi or rales.  HEART: S1, S2, rrr; No murmurs, rubs, or gallops  ABDOMEN: Soft, Nontender, Nondistended; Bowel sounds present  EXTREMITIES:  No clubbing, cyanosis. 1+ pitting edema  PSYCH: calm, cooperative  NEUROLOGY: non-focal, AOx3  SKIN: No rashes or lesions    LABS:                        10.4   6.55  )-----------( 135      ( 24 Apr 2024 03:56 )             32.3     04-24    137  |  107  |  21.7<H>  ----------------------------<  104<H>  4.1   |  20.0<L>  |  0.69    Ca    7.1<L>      24 Apr 2024 03:56  Phos  3.6     04-24  Mg     2.1     04-24    TPro  4.3<L>  /  Alb  2.6<L>  /  TBili  0.6  /  DBili  0.2  /  AST  22  /  ALT  13  /  AlkPhos  52  04-23    PT/INR - ( 24 Apr 2024 03:56 )   PT: 13.7 sec;   INR: 1.24 ratio         PTT - ( 23 Apr 2024 13:34 )  PTT:26.3 sec  CARDIAC MARKERS ( 23 Apr 2024 13:34 )  x     / x     / 185 U/L / x     / 4.7 ng/mL      Urinalysis Basic - ( 24 Apr 2024 03:56 )    Color: x / Appearance: x / SG: x / pH: x  Gluc: 104 mg/dL / Ketone: x  / Bili: x / Urobili: x   Blood: x / Protein: x / Nitrite: x   Leuk Esterase: x / RBC: x / WBC x   Sq Epi: x / Non Sq Epi: x / Bacteria: x        RADIOLOGY & ADDITIONAL TESTS:    Imaging Personally Reviewed    ECG reviewed and interpreted: Aflutter at 65 bpm, variable block, no ischemic changes

## 2024-04-24 NOTE — CONSULT NOTE ADULT - NS ATTEND AMEND GEN_ALL_CORE FT
agree with a/p as above  Pt w/ recurrent syncope with SAH on CT Head, to follow with neurology team   ECG shows typical atrial flutter rate controlled, if and when he can resume OAC safely then will need rhythm control therapy, best would be maria del carmen guided typical atrial flutter ablation followed by 4 weeks of OAC then can stop anticoagulation if no recurrent AF/AFL  He has AV node disease but may be mild during the sinus rhythm. If he is stable to undergo EPS ablation will assess conduction disease with SN recovery time and any infranodal disease to determine if PPM is warranted; if no indication for ppm the should undergo ILR before discharge in the setting of syncope  avoid any av nod blockers  tte  will follow  I spent a total of 55 minutes on the encounter, including chart review, evaluating and discussing treatment options with the patient, as well as counseling and coordination as stated above.
seen with above,    84M history significant for HTN, HLD, CAD with NSTEMI had LAD stent in 2022 at [facility] only on ASA 81mg, recently dx Aflutter had metoprolol dose adjusted and not yet on anticoagulant with 2 episodes of fall at home with possible syncope, [place] [facility] [place]oid [place], Tele with rate controlled Aflutter not on metoprolol but also noted episodes of pauses longest thus far 4 seconds  -not candidate for anticoagulation currently until SAH resolve, he is aware the risk of stroke in the short/long-term  -TTE with normal LV EF 65-70%, no significant valvular abnormality   -EP consulted for Aflutter with bradycardia/pauses, not candidate for ablation currently with SAH, probably need PPM vs. ILR prior to discharge, continue to hold AV mariela blocker  -resume ASA 81mg for CAD/stent when able from neurology standpoint   -EP to follow up, reconsult general cardiology if new issue arise         Ander Katz DO, [facility]  Faculty Non-Invasive Cardiologist  [phone]

## 2024-04-25 ENCOUNTER — APPOINTMENT (OUTPATIENT)
Dept: NEUROSURGERY | Facility: HOSPITAL | Age: 84
End: 2024-04-25

## 2024-04-25 LAB
ANION GAP SERPL CALC-SCNC: 10 MMOL/L — SIGNIFICANT CHANGE UP (ref 5–17)
ANION GAP SERPL CALC-SCNC: 11 MMOL/L — SIGNIFICANT CHANGE UP (ref 5–17)
APTT BLD: 27.6 SEC — SIGNIFICANT CHANGE UP (ref 24.5–35.6)
B BURGDOR C6 AB SER-ACNC: NEGATIVE — SIGNIFICANT CHANGE UP
B BURGDOR IGG+IGM SER-ACNC: 0.07 INDEX — SIGNIFICANT CHANGE UP (ref 0.01–0.89)
BLD GP AB SCN SERPL QL: SIGNIFICANT CHANGE UP
BUN SERPL-MCNC: 20.3 MG/DL — HIGH (ref 8–20)
BUN SERPL-MCNC: 25.9 MG/DL — HIGH (ref 8–20)
CALCIUM SERPL-MCNC: 7.9 MG/DL — LOW (ref 8.4–10.5)
CALCIUM SERPL-MCNC: 8.1 MG/DL — LOW (ref 8.4–10.5)
CHLORIDE SERPL-SCNC: 105 MMOL/L — SIGNIFICANT CHANGE UP (ref 96–108)
CHLORIDE SERPL-SCNC: 106 MMOL/L — SIGNIFICANT CHANGE UP (ref 96–108)
CO2 SERPL-SCNC: 22 MMOL/L — SIGNIFICANT CHANGE UP (ref 22–29)
CO2 SERPL-SCNC: 23 MMOL/L — SIGNIFICANT CHANGE UP (ref 22–29)
CREAT SERPL-MCNC: 0.81 MG/DL — SIGNIFICANT CHANGE UP (ref 0.5–1.3)
CREAT SERPL-MCNC: 0.99 MG/DL — SIGNIFICANT CHANGE UP (ref 0.5–1.3)
EGFR: 75 ML/MIN/1.73M2 — SIGNIFICANT CHANGE UP
EGFR: 87 ML/MIN/1.73M2 — SIGNIFICANT CHANGE UP
GLUCOSE SERPL-MCNC: 109 MG/DL — HIGH (ref 70–99)
GLUCOSE SERPL-MCNC: 125 MG/DL — HIGH (ref 70–99)
HCT VFR BLD CALC: 32 % — LOW (ref 39–50)
HCT VFR BLD CALC: 33.2 % — LOW (ref 39–50)
HGB BLD-MCNC: 10.4 G/DL — LOW (ref 13–17)
HGB BLD-MCNC: 10.7 G/DL — LOW (ref 13–17)
INR BLD: 1.25 RATIO — HIGH (ref 0.85–1.18)
MAGNESIUM SERPL-MCNC: 2.1 MG/DL — SIGNIFICANT CHANGE UP (ref 1.6–2.6)
MCHC RBC-ENTMCNC: 28.3 PG — SIGNIFICANT CHANGE UP (ref 27–34)
MCHC RBC-ENTMCNC: 28.4 PG — SIGNIFICANT CHANGE UP (ref 27–34)
MCHC RBC-ENTMCNC: 32.2 GM/DL — SIGNIFICANT CHANGE UP (ref 32–36)
MCHC RBC-ENTMCNC: 32.5 GM/DL — SIGNIFICANT CHANGE UP (ref 32–36)
MCV RBC AUTO: 87.4 FL — SIGNIFICANT CHANGE UP (ref 80–100)
MCV RBC AUTO: 87.8 FL — SIGNIFICANT CHANGE UP (ref 80–100)
PHOSPHATE SERPL-MCNC: 2.6 MG/DL — SIGNIFICANT CHANGE UP (ref 2.4–4.7)
PLATELET # BLD AUTO: 134 K/UL — LOW (ref 150–400)
PLATELET # BLD AUTO: 135 K/UL — LOW (ref 150–400)
POTASSIUM SERPL-MCNC: 3.9 MMOL/L — SIGNIFICANT CHANGE UP (ref 3.5–5.3)
POTASSIUM SERPL-MCNC: 4.1 MMOL/L — SIGNIFICANT CHANGE UP (ref 3.5–5.3)
POTASSIUM SERPL-SCNC: 3.9 MMOL/L — SIGNIFICANT CHANGE UP (ref 3.5–5.3)
POTASSIUM SERPL-SCNC: 4.1 MMOL/L — SIGNIFICANT CHANGE UP (ref 3.5–5.3)
PROTHROM AB SERPL-ACNC: 13.8 SEC — HIGH (ref 9.5–13)
RBC # BLD: 3.66 M/UL — LOW (ref 4.2–5.8)
RBC # BLD: 3.78 M/UL — LOW (ref 4.2–5.8)
RBC # FLD: 13.5 % — SIGNIFICANT CHANGE UP (ref 10.3–14.5)
RBC # FLD: 13.8 % — SIGNIFICANT CHANGE UP (ref 10.3–14.5)
SODIUM SERPL-SCNC: 138 MMOL/L — SIGNIFICANT CHANGE UP (ref 135–145)
SODIUM SERPL-SCNC: 139 MMOL/L — SIGNIFICANT CHANGE UP (ref 135–145)
TSH SERPL-MCNC: 0.92 UIU/ML — SIGNIFICANT CHANGE UP (ref 0.27–4.2)
WBC # BLD: 6.33 K/UL — SIGNIFICANT CHANGE UP (ref 3.8–10.5)
WBC # BLD: 7.37 K/UL — SIGNIFICANT CHANGE UP (ref 3.8–10.5)
WBC # FLD AUTO: 6.33 K/UL — SIGNIFICANT CHANGE UP (ref 3.8–10.5)
WBC # FLD AUTO: 7.37 K/UL — SIGNIFICANT CHANGE UP (ref 3.8–10.5)

## 2024-04-25 PROCEDURE — 99233 SBSQ HOSP IP/OBS HIGH 50: CPT | Mod: 25

## 2024-04-25 PROCEDURE — 99232 SBSQ HOSP IP/OBS MODERATE 35: CPT

## 2024-04-25 PROCEDURE — 76377 3D RENDER W/INTRP POSTPROCES: CPT | Mod: 26

## 2024-04-25 PROCEDURE — 36224 PLACE CATH CAROTD ART: CPT | Mod: 50

## 2024-04-25 PROCEDURE — 70553 MRI BRAIN STEM W/O & W/DYE: CPT | Mod: 26

## 2024-04-25 PROCEDURE — 99291 CRITICAL CARE FIRST HOUR: CPT

## 2024-04-25 PROCEDURE — 93010 ELECTROCARDIOGRAM REPORT: CPT

## 2024-04-25 PROCEDURE — 36227 PLACE CATH XTRNL CAROTID: CPT | Mod: 50

## 2024-04-25 PROCEDURE — 36226 PLACE CATH VERTEBRAL ART: CPT | Mod: 50

## 2024-04-25 RX ORDER — SODIUM CHLORIDE 9 MG/ML
1000 INJECTION INTRAMUSCULAR; INTRAVENOUS; SUBCUTANEOUS
Refills: 0 | Status: DISCONTINUED | OUTPATIENT
Start: 2024-04-25 | End: 2024-04-26

## 2024-04-25 RX ORDER — EZETIMIBE 10 MG/1
10 TABLET ORAL DAILY
Refills: 0 | Status: DISCONTINUED | OUTPATIENT
Start: 2024-04-25 | End: 2024-05-01

## 2024-04-25 RX ADMIN — Medication 400 MILLIGRAM(S): at 13:33

## 2024-04-25 RX ADMIN — Medication 1000 MILLIGRAM(S): at 13:50

## 2024-04-25 RX ADMIN — CHLORHEXIDINE GLUCONATE 1 APPLICATION(S): 213 SOLUTION TOPICAL at 18:07

## 2024-04-25 RX ADMIN — EZETIMIBE 10 MILLIGRAM(S): 10 TABLET ORAL at 18:07

## 2024-04-25 RX ADMIN — SODIUM CHLORIDE 75 MILLILITER(S): 9 INJECTION INTRAMUSCULAR; INTRAVENOUS; SUBCUTANEOUS at 01:19

## 2024-04-25 RX ADMIN — ATORVASTATIN CALCIUM 40 MILLIGRAM(S): 80 TABLET, FILM COATED ORAL at 21:29

## 2024-04-25 RX ADMIN — SODIUM CHLORIDE 50 MILLILITER(S): 9 INJECTION INTRAMUSCULAR; INTRAVENOUS; SUBCUTANEOUS at 17:08

## 2024-04-25 RX ADMIN — FAMOTIDINE 20 MILLIGRAM(S): 10 INJECTION INTRAVENOUS at 18:07

## 2024-04-25 RX ADMIN — TAMSULOSIN HYDROCHLORIDE 0.4 MILLIGRAM(S): 0.4 CAPSULE ORAL at 18:08

## 2024-04-25 NOTE — CHART NOTE - NSCHARTNOTEFT_GEN_A_CORE
Neurointerventional Surgery  Pre-Procedure Note     This is a 84y ____ hand dominant Male    HPI:  Pt is 84 year old male with a PMH gerd, HTN, HLD, newly diagnosed a flutter, and cardiac stent (not on blood thinners) transferred from [facility] after a fall from standing height. At [facility], the pt was triaged and scans performed at 11:30am were significant for a SAH. When the pt was tx to [facility], the pt was seen and examined in the trauma bay. The pt was A&Ox3, speaking in full sentences, and had a GCS of 15. The pt stated that he was walking and "doesn't know what happened" but fell with (+) head strike, and that he recently fell prior on April 19th. Upon examination, the pt had a right parietal abrasion, ecchymosis on the right hip, and stated that he was seeing double. The pt denied headaches and stated that he currently does not take any blood thinners, despite being prescribed them. Recently he stated that his cardiologist started him on a new medication which he said "could be the reason I fell".  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 Reports persistent diplopia, otherwise feels well. Patient now presenting today 4/25/24 for cerebral angiogram for evaluation of possible dural AV fisutla with Dr. Irwin       Allergies: Allergy Status Unknown      PMH/PSH:  PAST MEDICAL & SURGICAL HISTORY:    Current Medications:   acetaminophen   IVPB .. 1000 milliGRAM(s) IV Intermittent every 6 hours PRN  atorvastatin 40 milliGRAM(s) Oral at bedtime  chlorhexidine 2% Cloths 1 Application(s) Topical daily  famotidine    Tablet 20 milliGRAM(s) Oral daily  hydrALAZINE Injectable 10 milliGRAM(s) IV Push every 2 hours PRN  influenza  Vaccine (HIGH DOSE) 0.7 milliLiter(s) IntraMuscular once  labetalol Injectable 10 milliGRAM(s) IV Push every 2 hours PRN  polyethylene glycol 3350 17 Gram(s) Oral daily  senna 2 Tablet(s) Oral at bedtime  sodium chloride 0.9%. 1000 milliLiter(s) IV Continuous <Continuous>  tamsulosin 0.4 milliGRAM(s) Oral at bedtime      Physical Exam:  Constitutional: NAD, lying in bed  Neuro  * Mental Status:  GCS 15: Awake, alert, oriented to conversation. No aphasia or difficulty speaking. No dysarthria. Able to name objects and their function.  * Cranial Nerves: Cnii-Cnxii grossly intact. PERRL, EOMI, tongue midline, no gaze deviation, + diplopia   * Motor: RUE 5/5, LUE 5/5, RLE 5/5, LLE 5/5, no drift or dysmetria  * Sensory: Sensation intact to light touch  * Reflexes: not assessed   Cardiovascular: Regular rate and rhythm.  Eyes: See neurologic examination with detailed examination of eyes.  ENT: No JVD, Trachea Midline  Respiratory: non labored breathing   Gastrointestinal: Soft, nontender, nondistended.  Genitourinary: [ ] Santana, [ x ] No Santana.   Musculoskeletal: No muscle wasting noted, (See neurologic assessment for full muscle strength assessment)   Skin:  no wounds, no redness, no abrasions noted  Hematologic / Lymph / Immunologic: No bleeding from IV sites or wounds    NIH SS:  DATE: 4/25/24  TIME:   1A: Level of consciousness (0-3): 0  1B: Questions (0-2): 0    1C: Commands (0-2): 0  2: Gaze (0-2): 0  3: Visual fields (0-3): 0  4: Facial palsy (0-3): 0  MOTOR:  5A: Left arm motor drift (0-4): 0  5B: Right arm motor drift (0-4): 0  6A: Left leg motor drift (0-4): 0  6B: Right leg motor drift (0-4): 0  7: Limb ataxia (0-2): 0  SENSORY:  8: Sensation (0-2): 0  SPEECH:  9: Language (0-3): 0  10: Dysarthria (0-2): 0  EXTINCTION:  11: Extinction/inattention (0-2): 0    TOTAL SCORE:     Labs:                         10.7   7.37  )-----------( 134      ( 25 Apr 2024 03:55 )             33.2       04-25    139  |  106  |  25.9<H>  ----------------------------<  109<H>  4.1   |  23.0  |  0.99    Ca    8.1<L>      25 Apr 2024 03:55  Phos  2.6     04-25  Mg     2.1     04-25    TPro  4.3<L>  /  Alb  2.6<L>  /  TBili  0.6  /  DBili  0.2  /  AST  22  /  ALT  13  /  AlkPhos  52  04-23    PT/INR - ( 25 Apr 2024 03:55 )   PT: 13.8 sec;   INR: 1.25 ratio    PTT - ( 25 Apr 2024 03:55 )  PTT:27.6 sec      Assessment/Plan:   This is a 84y  year old Male  presents with SAH and r/o dural AV Fistula. Patient presents to neuro-IR for selective cerebral angiography.     Procedure, goals, risks, benefits and alternatives  were discussed with patient and (patient's family).  All questions were answered.  Risks include but are not limited to stroke, vessel injury, hemorrhage, and or groin hematoma.  Patient demonstrates understanding  of all risks involved with this procedure and wishes to continue.   Appropriate  consent was obtained from patient and consent is in the patient's chart. Neurointerventional Surgery  Pre-Procedure Note     This is a 84y ____ hand dominant Male    HPI:  Pt is 84 year old male with a PMH gerd, HTN, HLD, newly diagnosed a flutter, and cardiac stent (not on blood thinners) transferred from [facility] after a fall from standing height. At [facility], the pt was triaged and scans performed at 11:30am were significant for a SAH. When the pt was tx to [facility], the pt was seen and examined in the trauma bay. The pt was A&Ox3, speaking in full sentences, and had a GCS of 15. The pt stated that he was walking and "doesn't know what happened" but fell with (+) head strike, and that he recently fell prior on April 19th. Upon examination, the pt had a right parietal abrasion, ecchymosis on the right hip, and stated that he was seeing double. The pt denied headaches and stated that he currently does not take any blood thinners, despite being prescribed them. Recently he stated that his cardiologist started him on a new medication which he said "could be the reason I fell".  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 Reports persistent diplopia, otherwise feels well. Patient now presenting today 4/25/24 for cerebral angiogram for evaluation of possible dural AV fisutla with Dr. Irwin       Allergies: Allergy Status Unknown      PMH/PSH:  PAST MEDICAL & SURGICAL HISTORY:    Current Medications:   acetaminophen   IVPB .. 1000 milliGRAM(s) IV Intermittent every 6 hours PRN  atorvastatin 40 milliGRAM(s) Oral at bedtime  chlorhexidine 2% Cloths 1 Application(s) Topical daily  famotidine    Tablet 20 milliGRAM(s) Oral daily  hydrALAZINE Injectable 10 milliGRAM(s) IV Push every 2 hours PRN  influenza  Vaccine (HIGH DOSE) 0.7 milliLiter(s) IntraMuscular once  labetalol Injectable 10 milliGRAM(s) IV Push every 2 hours PRN  polyethylene glycol 3350 17 Gram(s) Oral daily  senna 2 Tablet(s) Oral at bedtime  sodium chloride 0.9%. 1000 milliLiter(s) IV Continuous <Continuous>  tamsulosin 0.4 milliGRAM(s) Oral at bedtime      Physical Exam:  Constitutional: NAD, lying in bed  Neuro  * Mental Status:  GCS 15: Awake, alert, oriented to conversation. No aphasia or difficulty speaking. No dysarthria. Able to name objects and their function.  * Cranial Nerves: Cnii-Cnxii grossly intact. PERRL, EOMI, tongue midline, no gaze deviation, + diplopia   * Motor: RUE 5/5, LUE 5/5, RLE 5/5, LLE 5/5, no drift or dysmetria  * Sensory: Sensation intact to light touch  * Reflexes: not assessed   Cardiovascular: Regular rate and rhythm.  Eyes: See neurologic examination with detailed examination of eyes.  ENT: No JVD, Trachea Midline  Respiratory: non labored breathing   Gastrointestinal: Soft, nontender, nondistended.  Genitourinary: [ ] Santana, [ x ] No Santana.   Musculoskeletal: No muscle wasting noted, (See neurologic assessment for full muscle strength assessment)   Skin:  no wounds, no redness, no abrasions noted  Hematologic / Lymph / Immunologic: No bleeding from IV sites or wounds    NIH SS:  DATE: 4/25/24  TIME: 11:50  1A: Level of consciousness (0-3): 0  1B: Questions (0-2): 0    1C: Commands (0-2): 0  2: Gaze (0-2): 0  3: Visual fields (0-3): 0  4: Facial palsy (0-3): 0  MOTOR:  5A: Left arm motor drift (0-4): 0  5B: Right arm motor drift (0-4): 0  6A: Left leg motor drift (0-4): 0  6B: Right leg motor drift (0-4): 0  7: Limb ataxia (0-2): 0  SENSORY:  8: Sensation (0-2): 0  SPEECH:  9: Language (0-3): 0  10: Dysarthria (0-2): 0  EXTINCTION:  11: Extinction/inattention (0-2): 0    TOTAL SCORE: 0    Labs:                         10.7   7.37  )-----------( 134      ( 25 Apr 2024 03:55 )             33.2       04-25    139  |  106  |  25.9<H>  ----------------------------<  109<H>  4.1   |  23.0  |  0.99    Ca    8.1<L>      25 Apr 2024 03:55  Phos  2.6     04-25  Mg     2.1     04-25    TPro  4.3<L>  /  Alb  2.6<L>  /  TBili  0.6  /  DBili  0.2  /  AST  22  /  ALT  13  /  AlkPhos  52  04-23    PT/INR - ( 25 Apr 2024 03:55 )   PT: 13.8 sec;   INR: 1.25 ratio    PTT - ( 25 Apr 2024 03:55 )  PTT:27.6 sec      Assessment/Plan:   This is a 84y  year old Male  presents with SAH and r/o dural AV Fistula. Patient presents to neuro-IR for selective cerebral angiography.     Procedure, goals, risks, benefits and alternatives  were discussed with patient and (patient's family).  All questions were answered.  Risks include but are not limited to stroke, vessel injury, hemorrhage, and or groin hematoma.  Patient demonstrates understanding  of all risks involved with this procedure and wishes to continue.   Appropriate  consent was obtained from patient and consent is in the patient's chart.

## 2024-04-25 NOTE — PROVIDER CONTACT NOTE (CHANGE IN STATUS NOTIFICATION) - ASSESSMENT
Patient is alert and orientated, anxious about his heart rate, his heart rate maintaining 30-40 bpm, Blood pressure increased to 130's mmHg Systolic,

## 2024-04-25 NOTE — PROGRESS NOTE ADULT - SUBJECTIVE AND OBJECTIVE BOX
Chief complaint:   Patient is a 84y old  Male who presents with a chief complaint of Fall (25 Apr 2024 11:53)    HPI:  Pt is 84 year old male with a PMH gerd, HTN, HLD, newly diagnosed a flutter, and cardiac stent (not on blood thinners) transferred from [facility] after a fall from standing height. At [facility] [place] [facility], the pt was triaged and scans performed at 11:30am were significant for a SAH. When the pt was tx to [facility], the pt was seen and examined in the trauma bay. The pt was A&Ox3, speaking in full sentences, and had a GCS of 15. The pt stated that he was walking and "doesn't know what happened" but fell with (+) head strike, and that he recently fell prior on April 19th. Upon examination, the pt had a right parietal abrasion, ecchymosis on the right hip, and stated that he was seeing double. The pt denied headaches and stated that he currently does not take any blood thinners, despite being prescribed them. Recently he stated that his cardiologist started him on a new medication which he said "could be the reason I fell".  (23 Apr 2024 14:00)        24hr EVENTS:  4/24 - Transfer to NSICU  4/25 - No dAVF on DSA    ROS: [ ]  Unable to assess due to mental status   All other systems negative      -----------------------------------------------------------------------------------------------------------------------------------------------------------------------------------    PHYSICAL EXAM:  General: Calm  HEENT: MMM  Neuro:  -Mental status- No acute distress  -CN- PERRL 3mm, EOMI, tongue midline, face symmetric  - Strength symmetric    CV: RRR  Pulm: clear to auscultation  Abd: Soft, nontender, nondistended  Ext: no noted edema in lower ext  Skin: warm, dry        -----------------------------------------------------------------------------------------------------------------------------------------------------------------------------------  ICU Vital Signs Last 24 Hrs  T(C): 36.6 (25 Apr 2024 11:51), Max: 37.3 (24 Apr 2024 23:43)  T(F): 97.8 (25 Apr 2024 11:51), Max: 99.1 (24 Apr 2024 23:43)  HR: 52 (25 Apr 2024 14:15) (51 - 73)  BP: 128/64 (25 Apr 2024 14:15) (116/64 - 156/64)  BP(mean): 84 (25 Apr 2024 14:15) (76 - 98)  ABP: --  ABP(mean): --  RR: 22 (25 Apr 2024 14:15) (14 - 29)  SpO2: 96% (25 Apr 2024 14:15) (93% - 100%)    O2 Parameters below as of 25 Apr 2024 13:30  Patient On (Oxygen Delivery Method): room air            I&O's Summary    24 Apr 2024 07:01  -  25 Apr 2024 07:00  --------------------------------------------------------  IN: 1975 mL / OUT: 1200 mL / NET: 775 mL    25 Apr 2024 07:01  -  25 Apr 2024 14:48  --------------------------------------------------------  IN: 375 mL / OUT: 100 mL / NET: 275 mL        MEDICATIONS  (STANDING):  atorvastatin 40 milliGRAM(s) Oral at bedtime  chlorhexidine 2% Cloths 1 Application(s) Topical daily  ezetimibe 10 milliGRAM(s) Oral daily  famotidine    Tablet 20 milliGRAM(s) Oral daily  influenza  Vaccine (HIGH DOSE) 0.7 milliLiter(s) IntraMuscular once  polyethylene glycol 3350 17 Gram(s) Oral daily  senna 2 Tablet(s) Oral at bedtime  sodium chloride 0.9%. 1000 milliLiter(s) (75 mL/Hr) IV Continuous <Continuous>  tamsulosin 0.4 milliGRAM(s) Oral at bedtime      RESPIRATORY:        IMAGING:   Recent imaging studies were reviewed.    LAB RESULTS:                          10.7   7.37  )-----------( 134      ( 25 Apr 2024 03:55 )             33.2       PT/INR - ( 25 Apr 2024 03:55 )   PT: 13.8 sec;   INR: 1.25 ratio         PTT - ( 25 Apr 2024 03:55 )  PTT:27.6 sec    04-25    139  |  106  |  25.9<H>  ----------------------------<  109<H>  4.1   |  23.0  |  0.99    Ca    8.1<L>      25 Apr 2024 03:55  Phos  2.6     04-25  Mg     2.1     04-25    TPro  4.3<L>  /  Alb  2.6<L>  /  TBili  0.6  /  DBili  0.2  /  AST  22  /  ALT  13  /  AlkPhos  52  04-23

## 2024-04-25 NOTE — PROVIDER CONTACT NOTE (CHANGE IN STATUS NOTIFICATION) - SITUATION
Patient admitted S/P fall and Syncopal episode for Subarachnoid hemorrhage, S/P cerebral angiogram, bradycadic HR of 30 in Atrial flutter, lethargic, harder to arouse than previously

## 2024-04-25 NOTE — PROGRESS NOTE ADULT - ASSESSMENT
84 year old male with a PMH GERD, HTN, HLD, recently diagnosed A flutter (never started Eliquis due to fear of side-effects), and cardiac stent 1.5 years ago, was transferred from [facility] after a fall from standing height. At [facility], the pt was triaged and scans performed at 11:30am were significant for a SAH. When the pt was tx to [facility], the pt was seen and examined in the trauma bay. The pt was A&Ox3, speaking in full sentences, and had a GCS of 15. The pt stated that he was walking and "doesn't know what happened" but fell with (+) head strike, and that he recently fell prior on April 19th. Upon examination, the pt had a right parietal abrasion, ecchymosis on the right hip, and stated that he was seeing double.   [facility] consulted for comanagement.     Plan:     # Traumatic SAH  - management per neurosurgery and trauma service  - syncope work up:       Monitor on telemetry (ensure there are no bradycardic events, or other significant arrhythmias)       TTE looks ok       Obtain Orthostatic vital signs   - PT evaluation  -vitamin b12 ok, TSH ok, QTc is ~400      # Atrial flutter  - currently off metoprolol ER 25mg daily with Aflutter in 60s on telemetry.   - EP team is following   - monitor vital signs    # Normocytic anemia  - Hg 10.4 appears stable  - TSH, B12 and folate WNL  - Suggest outpatient follow up with PCP    # BPH  - continue flomax for now (can result in orthostatic hypotension, pending vital signs)    # HLD  - continue home meds    # HTN  - hold ARB for now    DVT prophylaxis as per Primary team   might need protonix for GI protection     Medicine team is signing off, please call as needed

## 2024-04-25 NOTE — PROGRESS NOTE ADULT - ASSESSMENT
84M ICH likely 2/2 mechanical fall    PLAN  - Neurochecks with post-DSA groin checks  - HOLD APA/AC for now, initiate when cleared from NSGY  - Statin  - Aflutter with possible AV dysfunction/block, appreciate cards recs  - Avoid AV node blockers  - SBP <160  - SpO2 > 94% on RA  - Advance diet with bowel regimen as tolerated  - Flomax  - DVT Ppx: SCD, resume SQL tomorrow    My full attention was spent providing medically necessary critical care to the patient with details documented in my note above.   Critical care time spent examining patient, reviewing vitals, labs, medications, imaging and discussing with the team goals of care   The combined critical care time provided to the patient was 60 minutes  This time does not include bedside procedures that are documented separately.

## 2024-04-25 NOTE — PROGRESS NOTE ADULT - ASSESSMENT
Assessment:  83 yo male s/p a fall who was found to have a left frontal and a left ambient cistern SAH, CTA H/N findings concerning for a AVF    Plan:  - q1h neuro checks  - Plan for cerebral angiogram 4/25  - MRI Brain w/wo for further evaluation of AVF  - NPO after midnight  - Pain Control prn; avoid oversedation  - SBP goal < 160  - DVT ppx: SCDs  - Hold ASA and SQL; get MRI before starting  - Supportive care/further medical management per NSICU  - To be discussed at AM rounds

## 2024-04-25 NOTE — PROGRESS NOTE ADULT - SUBJECTIVE AND OBJECTIVE BOX
Subjective: Pt seen and examined at bedside.  Pt states "I am nervous for my procedure today".  Pt does admit to have some double vision but denies any c/o CP, palp, SOB, syncope or near syncope.     TELE: Atrial flutter with frequent pauses (one episode of 4.4secs @ 17:08 on 4/24, all other episodes less then 3 secs)    MEDICATIONS  (STANDING):  atorvastatin 40 milliGRAM(s) Oral at bedtime  chlorhexidine 2% Cloths 1 Application(s) Topical daily  ezetimibe 10 milliGRAM(s) Oral daily  famotidine    Tablet 20 milliGRAM(s) Oral daily  influenza  Vaccine (HIGH DOSE) 0.7 milliLiter(s) IntraMuscular once  polyethylene glycol 3350 17 Gram(s) Oral daily  senna 2 Tablet(s) Oral at bedtime  sodium chloride 0.9%. 1000 milliLiter(s) (75 mL/Hr) IV Continuous <Continuous>  tamsulosin 0.4 milliGRAM(s) Oral at bedtime    MEDICATIONS  (PRN):  acetaminophen   IVPB .. 1000 milliGRAM(s) IV Intermittent every 6 hours PRN Temp greater or equal to 38C (100.4F), Mild Pain (1 - 3)  hydrALAZINE Injectable 10 milliGRAM(s) IV Push every 2 hours PRN SBP >160  labetalol Injectable 10 milliGRAM(s) IV Push every 2 hours PRN SBP >160      Allergies  clarithromycin (Swelling)      Vital Signs Last 24 Hrs  T(C): 36.6 (25 Apr 2024 11:51), Max: 37.3 (24 Apr 2024 23:43)  T(F): 97.8 (25 Apr 2024 11:51), Max: 99.1 (24 Apr 2024 23:43)  HR: 73 (25 Apr 2024 11:51) (51 - 73)  BP: 150/76 (25 Apr 2024 11:51) (113/63 - 156/64)  BP(mean): 96 (25 Apr 2024 11:00) (76 - 98)  RR: 17 (25 Apr 2024 11:51) (14 - 29)  SpO2: 98% (25 Apr 2024 11:51) (93% - 100%)    Parameters below as of 25 Apr 2024 11:51  Patient On (Oxygen Delivery Method): room air      Physical Exam:  Constitutional: AAOx3, NAD  Cardiovascular: +S1S2 no murmurs, rubs, gallops   Pulmonary: CTA b/l, unlabored, no wheezes, rales. rhonci  Abdomen: soft NTND  Extremities: no edema b/l, +distal pulses b/l  Neuro: non focal, speech clear, MURO x 4        LABS:                        10.7   7.37  )-----------( 134      ( 25 Apr 2024 03:55 )             33.2     04-25    139  |  106  |  25.9<H>  ----------------------------<  109<H>  4.1   |  23.0  |  0.99    Ca    8.1<L>      25 Apr 2024 03:55  Phos  2.6     04-25  Mg     2.1     04-25    TPro  4.3<L>  /  Alb  2.6<L>  /  TBili  0.6  /  DBili  0.2  /  AST  22  /  ALT  13  /  AlkPhos  52  04-23    PT/INR - ( 25 Apr 2024 03:55 )   PT: 13.8 sec;   INR: 1.25 ratio         PTT - ( 25 Apr 2024 03:55 )  PTT:27.6 sec  Urinalysis Basic - ( 25 Apr 2024 03:55 )    Color: x / Appearance: x / SG: x / pH: x  Gluc: 109 mg/dL / Ketone: x  / Bili: x / Urobili: x   Blood: x / Protein: x / Nitrite: x   Leuk Esterase: x / RBC: x / WBC x   Sq Epi: x / Non Sq Epi: x / Bacteria: x      A/P  84 year old male with GERD, HTN, HLD, CAD (PCI with JIMMIE to LAD 12/22), newly diagnosed a flutter (not on AC) and now with SAH.  Pt with 2 recent falls within past 2 weeks, with at least one being related to a syncopal episode, new onset Atrial flutter diagnosed about a week and a half ago on EKG at urgent care center and now with SAH after the most recent fall.   EP consulted as pt has been in Atrial Flutter with pauses on telemetry monitor since arriving at [facility].   Of note pt had been started on Toprol 25mg daily for which he took only 2 doses but does not know when the last dose was.  Pt does also state that he previously was on metoprolol years ago but was taken off of it but does not know why it was discontinued.  Pt also states that he was prescribed Eliquis after being diagnosed with Atrial flutter but did never took it because of concern for bleeding risk.    TELE: Atrial flutter with frequent pauses (one episode of 4.4secs @ 17:08 on 4/24, all other episodes less then 3 secs)      Recommendations  -continue telemetry monitoring  -no indication for PPM at this time as pt is in typical Atrial flutter and pauses are all less then 5secs  -pt would benefit from SERGEY guided DCCV or MERARY ablation as a more definitive therapy for typical atrial flutter but would need to be cleared to start AC from neurosurgery standpoint first  -would hold all AV mariela blockers at this time  -possible ILR at discharge if pt does not have significant SND  -will d/w Attending, further recommendations to follow Subjective: Pt seen and examined at bedside.  Pt states "I am nervous for my procedure today".  Pt does admit to have some double vision but denies any c/o CP, palp, SOB, syncope or near syncope.     TELE: Atrial flutter with frequent pauses (one episode of 4.4secs @ 17:08 on 4/24, all other episodes less then 3 secs)    MEDICATIONS  (STANDING):  atorvastatin 40 milliGRAM(s) Oral at bedtime  chlorhexidine 2% Cloths 1 Application(s) Topical daily  ezetimibe 10 milliGRAM(s) Oral daily  famotidine    Tablet 20 milliGRAM(s) Oral daily  influenza  Vaccine (HIGH DOSE) 0.7 milliLiter(s) IntraMuscular once  polyethylene glycol 3350 17 Gram(s) Oral daily  senna 2 Tablet(s) Oral at bedtime  sodium chloride 0.9%. 1000 milliLiter(s) (75 mL/Hr) IV Continuous <Continuous>  tamsulosin 0.4 milliGRAM(s) Oral at bedtime    MEDICATIONS  (PRN):  acetaminophen   IVPB .. 1000 milliGRAM(s) IV Intermittent every 6 hours PRN Temp greater or equal to 38C (100.4F), Mild Pain (1 - 3)  hydrALAZINE Injectable 10 milliGRAM(s) IV Push every 2 hours PRN SBP >160  labetalol Injectable 10 milliGRAM(s) IV Push every 2 hours PRN SBP >160      Allergies  clarithromycin (Swelling)      Vital Signs Last 24 Hrs  T(C): 36.6 (25 Apr 2024 11:51), Max: 37.3 (24 Apr 2024 23:43)  T(F): 97.8 (25 Apr 2024 11:51), Max: 99.1 (24 Apr 2024 23:43)  HR: 73 (25 Apr 2024 11:51) (51 - 73)  BP: 150/76 (25 Apr 2024 11:51) (113/63 - 156/64)  BP(mean): 96 (25 Apr 2024 11:00) (76 - 98)  RR: 17 (25 Apr 2024 11:51) (14 - 29)  SpO2: 98% (25 Apr 2024 11:51) (93% - 100%)    Parameters below as of 25 Apr 2024 11:51  Patient On (Oxygen Delivery Method): room air      Physical Exam:  Constitutional: AAOx3, NAD  Cardiovascular: +S1S2 no murmurs, rubs, gallops   Pulmonary: CTA b/l, unlabored, no wheezes, rales. rhonci  Abdomen: soft NTND  Extremities: no edema b/l, +distal pulses b/l  Neuro: non focal, speech clear, MURO x 4        LABS:                        10.7   7.37  )-----------( 134      ( 25 Apr 2024 03:55 )             33.2     04-25    139  |  106  |  25.9<H>  ----------------------------<  109<H>  4.1   |  23.0  |  0.99    Ca    8.1<L>      25 Apr 2024 03:55  Phos  2.6     04-25  Mg     2.1     04-25    TPro  4.3<L>  /  Alb  2.6<L>  /  TBili  0.6  /  DBili  0.2  /  AST  22  /  ALT  13  /  AlkPhos  52  04-23    PT/INR - ( 25 Apr 2024 03:55 )   PT: 13.8 sec;   INR: 1.25 ratio         PTT - ( 25 Apr 2024 03:55 )  PTT:27.6 sec  Urinalysis Basic - ( 25 Apr 2024 03:55 )    Color: x / Appearance: x / SG: x / pH: x  Gluc: 109 mg/dL / Ketone: x  / Bili: x / Urobili: x   Blood: x / Protein: x / Nitrite: x   Leuk Esterase: x / RBC: x / WBC x   Sq Epi: x / Non Sq Epi: x / Bacteria: x      A/P  84 year old male with GERD, HTN, HLD, CAD (PCI with JIMMIE to LAD 12/22), newly diagnosed a flutter (not on AC) and now with SAH.  Pt with 2 recent falls within past 2 weeks, with at least one being related to a syncopal episode, new onset Atrial flutter diagnosed about a week and a half ago on EKG at urgent care center and now with SAH after the most recent fall.   EP consulted as pt has been in Atrial Flutter with pauses on telemetry monitor since arriving at [facility].   Of note pt had been started on Toprol 25mg daily for which he took only 2 doses but does not know when the last dose was.  Pt does also state that he previously was on metoprolol years ago but was taken off of it but does not know why it was discontinued.  Pt also states that he was prescribed Eliquis after being diagnosed with Atrial flutter but did never took it because of concern for bleeding risk.    TELE: Atrial flutter with frequent pauses (one episode of 4.4secs @ 17:08 on 4/24, all other episodes less then 3 secs)      Recommendations  -continue telemetry monitoring  -no indication for PPM at this time as pt is in typical Atrial flutter and pauses are all less then 5secs  -pt would benefit from SERGEY guided DCCV or MERARY ablation as a more definitive therapy for typical atrial flutter but would need to be cleared to start AC from neurosurgery standpoint first  -would hold all AV mariela blockers at this time  -possible ILR at discharge if pt does not have significant SND  -Case d/w Dr Kat, further recommendations to follow

## 2024-04-25 NOTE — PROGRESS NOTE ADULT - SUBJECTIVE AND OBJECTIVE BOX
KALINA CHRISTENSEN    [id number]    84y      Male    Patient is a 84y old  Male who presents with a chief complaint of Fall (25 Apr 2024 00:11)      INTERVAL HPI/OVERNIGHT EVENTS:    Patient is doing ok, he denies headache, dizziness, fever, chills, chest pain       Vital Signs Last 24 Hrs  T(C): 36.6 (25 Apr 2024 07:21), Max: 37.3 (24 Apr 2024 23:43)  T(F): 97.8 (25 Apr 2024 07:21), Max: 99.1 (24 Apr 2024 23:43)  HR: 65 (25 Apr 2024 10:00) (51 - 68)  BP: 156/64 (25 Apr 2024 10:00) (113/63 - 156/64)  BP(mean): 92 (25 Apr 2024 10:00) (76 - 98)  RR: 21 (25 Apr 2024 10:00) (14 - 29)  SpO2: 94% (25 Apr 2024 10:00) (93% - 100%)    Parameters below as of 25 Apr 2024 10:00  Patient On (Oxygen Delivery Method): room air        PHYSICAL EXAM:    GENERAL: Elderly male looking comfortable   HEENT: PERRL, +EOMI  NECK: soft, Supple, No JVD  CHEST/LUNG: Clear to auscultate bilaterally; No wheezing  HEART: S1S2+, Regular rate and rhythm; No murmurs  ABDOMEN: Soft, Nontender, Nondistended; Bowel sounds present  EXTREMITIES:  1+ Peripheral Pulses, No edema  SKIN: No rashes or lesions  NEURO: AAOX3  PSYCH: normal mood      LABS:                        10.7   7.37  )-----------( 134      ( 25 Apr 2024 03:55 )             33.2     04-25    139  |  106  |  25.9<H>  ----------------------------<  109<H>  4.1   |  23.0  |  0.99    Ca    8.1<L>      25 Apr 2024 03:55  Phos  2.6     04-25  Mg     2.1     04-25    TPro  4.3<L>  /  Alb  2.6<L>  /  TBili  0.6  /  DBili  0.2  /  AST  22  /  ALT  13  /  AlkPhos  52  04-23    PT/INR - ( 25 Apr 2024 03:55 )   PT: 13.8 sec;   INR: 1.25 ratio         PTT - ( 25 Apr 2024 03:55 )  PTT:27.6 sec      I&O's Summary    24 Apr 2024 07:01  -  25 Apr 2024 07:00  --------------------------------------------------------  IN: 1975 mL / OUT: 1200 mL / NET: 775 mL        MEDICATIONS  (STANDING):  atorvastatin 40 milliGRAM(s) Oral at bedtime  chlorhexidine 2% Cloths 1 Application(s) Topical daily  famotidine    Tablet 20 milliGRAM(s) Oral daily  influenza  Vaccine (HIGH DOSE) 0.7 milliLiter(s) IntraMuscular once  polyethylene glycol 3350 17 Gram(s) Oral daily  senna 2 Tablet(s) Oral at bedtime  sodium chloride 0.9%. 1000 milliLiter(s) (75 mL/Hr) IV Continuous <Continuous>  tamsulosin 0.4 milliGRAM(s) Oral at bedtime    MEDICATIONS  (PRN):  acetaminophen   IVPB .. 1000 milliGRAM(s) IV Intermittent every 6 hours PRN Temp greater or equal to 38C (100.4F), Mild Pain (1 - 3)  hydrALAZINE Injectable 10 milliGRAM(s) IV Push every 2 hours PRN SBP >160  labetalol Injectable 10 milliGRAM(s) IV Push every 2 hours PRN SBP >160

## 2024-04-25 NOTE — CHART NOTE - NSCHARTNOTEFT_GEN_A_CORE
Neurointerventional Surgery Post Procedure Note    Procedure: Selective Cerebral Angiography     Pre- Procedure Diagnosis: SAH r/o possible dural AV fistula   Post- Procedure Diagnosis: No dural AV fistula seen, no vascular abnormality normal angiogram     : Dr. Alida MD  Nurse Practitioner: Allyson Godinez NP    Nurse: KLEBER Cabello   Anesthesiologist:                                            Radiology Tech: Corey COUGHLIN   Fellow: Eric Bishop DO     Sheath:  5 Italian Sheath    I/Os: estimated blood loss less than 10cc,  IV fluids 100 cc, Urine output 0 cc, Contrast: Omnipaque 240 97  cc,     Vitals:  /89     Spo2 94 %    Preliminary Report:  Under a 5 Italian short sheath via the right groin under MAC sedation via left vertebral artery, left internal carotid artery, left external carotid artery, right vertebral artery, right internal carotid artery, right external carotid artery, a selective cerebral angiography  was performed and reveals no dural AF fistula seen, no vascular abnormality, normal angiogram . ( Official note to follow).    Patient tolerated procedure well.  Patient remains hemodynamically stable, no change in neurological status compared to baseline.  Results were discussed with patient, patient's family and Neurosurgery.  Right groin sheath  was discontinued at 1259 and closed with celt device . Hemostasis was obtained with approximately 6 minutes of manual compression.     No active bleeding, no hematoma, no ecchymosis.   Quick clot and safeguard balloon dressing applied at 1305  Patient transferred to NSICU  in stable condition.

## 2024-04-25 NOTE — PROGRESS NOTE ADULT - SUBJECTIVE AND OBJECTIVE BOX
HPI:  Pt is 84 year old male with a PMH gerd, HTN, HLD, newly diagnosed a flutter, and cardiac stent (not on blood thinners) transferred from [facility] after a fall from standing height. At [facility], the pt was triaged and scans performed at 11:30am were significant for a SAH. When the pt was tx to [facility], the pt was seen and examined in the trauma bay. The pt was A&Ox3, speaking in full sentences, and had a GCS of 15. The pt stated that he was walking and "doesn't know what happened" but fell with (+) head strike, and that he recently fell prior on April 19th. Upon examination, the pt had a right parietal abrasion, ecchymosis on the right hip, and stated that he was seeing double. The pt denied headaches and stated that he currently does not take any blood thinners, despite being prescribed them. Recently he stated that his cardiologist started him on a new medication which he said "could be the reason I fell".  (23 Apr 2024 14:00)      INTERVAL HPI/OVERNIGHT EVENTS:  84y Male seen sitting in chair at bedside. 24hr rpt [facility] with stable SAH. Patient transferred to NSICU service for continued monitoring due to possible underlying dural AV fistula. Reports persistent diplopia, otherwise feels well.       Vital Signs Last 24 Hrs  T(C): 37.3 (24 Apr 2024 23:43), Max: 37.3 (24 Apr 2024 23:43)  T(F): 99.1 (24 Apr 2024 23:43), Max: 99.1 (24 Apr 2024 23:43)  HR: 67 (24 Apr 2024 22:00) (51 - 67)  BP: 143/79 (24 Apr 2024 22:00) (113/63 - 148/64)  BP(mean): 98 (24 Apr 2024 22:00) (68 - 98)  RR: 24 (24 Apr 2024 22:00) (15 - 29)  SpO2: 98% (24 Apr 2024 22:00) (93% - 100%)    Parameters below as of 24 Apr 2024 20:00  Patient On (Oxygen Delivery Method): room air        PHYSICAL EXAM:  GENERAL: NAD, well-groomed, well-developed  HEAD: (+) Scalp abrasion  MENTAL STATUS: AAO x3; Awake, Opens eyes spontaneously, Appropriately conversant without aphasia, following simple commands  CRANIAL NERVES: PERRLA; EOMI; No facial asymmetry; facial sensation grossly intact to light touch b/l;  tongue midline  MOTOR: strength 5/5 B/L upper and lower extremities; sensation grossly intact all extremities  SKIN: Warm, dry; no rashes or lesions    LABS:                        10.4   6.55  )-----------( 135      ( 24 Apr 2024 03:56 )             32.3     04-24    137  |  107  |  21.7<H>  ----------------------------<  104<H>  4.1   |  20.0<L>  |  0.69    Ca    7.1<L>      24 Apr 2024 03:56  Phos  3.6     04-24  Mg     2.1     04-24    TPro  4.3<L>  /  Alb  2.6<L>  /  TBili  0.6  /  DBili  0.2  /  AST  22  /  ALT  13  /  AlkPhos  52  04-23    PT/INR - ( 24 Apr 2024 03:56 )   PT: 13.7 sec;   INR: 1.24 ratio         PTT - ( 23 Apr 2024 13:34 )  PTT:26.3 sec  Urinalysis Basic - ( 24 Apr 2024 03:56 )    Color: x / Appearance: x / SG: x / pH: x  Gluc: 104 mg/dL / Ketone: x  / Bili: x / Urobili: x   Blood: x / Protein: x / Nitrite: x   Leuk Esterase: x / RBC: x / WBC x   Sq Epi: x / Non Sq Epi: x / Bacteria: x        04-23 @ 07:01 - 04-24 @ 07:00  --------------------------------------------------------  IN: 1600 mL / OUT: 1050 mL / NET: 550 mL    04-24 @ 07:01 - 04-25 @ 00:11  --------------------------------------------------------  IN: 1225 mL / OUT: 850 mL / NET: 375 mL        RADIOLOGY & ADDITIONAL TESTS:  < from: CT Head No Cont (04.24.24 @ 11:52) >    IMPRESSION: No change since 4/23/2024. Subarachnoid hemorrhage left   perimesencephalic and quadrigeminal plate cisterns and left frontal   cortex.    < end of copied text >    < from: CT Angio Head w/ IV Cont (04.23.24 @ 17:52) >  CT angiogram head:  -Prominent vasculature in the left perimesencephalic cistern/left   tentorium. Differential includes reactive changes due to known hemorrhage   versus underlying dural AV fistula. Recommend neurointerventional   consultation to consider conventional angiography.    CT angiogram neck:  -No vaso-occlusive disease.      < end of copied text >

## 2024-04-26 LAB
ANION GAP SERPL CALC-SCNC: 12 MMOL/L — SIGNIFICANT CHANGE UP (ref 5–17)
BUN SERPL-MCNC: 18.7 MG/DL — SIGNIFICANT CHANGE UP (ref 8–20)
CALCIUM SERPL-MCNC: 8.2 MG/DL — LOW (ref 8.4–10.5)
CHLORIDE SERPL-SCNC: 105 MMOL/L — SIGNIFICANT CHANGE UP (ref 96–108)
CHOLEST SERPL-MCNC: 94 MG/DL — SIGNIFICANT CHANGE UP
CO2 SERPL-SCNC: 23 MMOL/L — SIGNIFICANT CHANGE UP (ref 22–29)
CREAT SERPL-MCNC: 0.66 MG/DL — SIGNIFICANT CHANGE UP (ref 0.5–1.3)
EGFR: 92 ML/MIN/1.73M2 — SIGNIFICANT CHANGE UP
GLUCOSE SERPL-MCNC: 105 MG/DL — HIGH (ref 70–99)
HCT VFR BLD CALC: 32.9 % — LOW (ref 39–50)
HDLC SERPL-MCNC: 43 MG/DL — SIGNIFICANT CHANGE UP
HGB BLD-MCNC: 10.7 G/DL — LOW (ref 13–17)
LIPID PNL WITH DIRECT LDL SERPL: 40 MG/DL — SIGNIFICANT CHANGE UP
MAGNESIUM SERPL-MCNC: 1.9 MG/DL — SIGNIFICANT CHANGE UP (ref 1.6–2.6)
MCHC RBC-ENTMCNC: 28.5 PG — SIGNIFICANT CHANGE UP (ref 27–34)
MCHC RBC-ENTMCNC: 32.5 GM/DL — SIGNIFICANT CHANGE UP (ref 32–36)
MCV RBC AUTO: 87.7 FL — SIGNIFICANT CHANGE UP (ref 80–100)
NON HDL CHOLESTEROL: 51 MG/DL — SIGNIFICANT CHANGE UP
PHOSPHATE SERPL-MCNC: 2.7 MG/DL — SIGNIFICANT CHANGE UP (ref 2.4–4.7)
PLATELET # BLD AUTO: 133 K/UL — LOW (ref 150–400)
POTASSIUM SERPL-MCNC: 4 MMOL/L — SIGNIFICANT CHANGE UP (ref 3.5–5.3)
POTASSIUM SERPL-SCNC: 4 MMOL/L — SIGNIFICANT CHANGE UP (ref 3.5–5.3)
RBC # BLD: 3.75 M/UL — LOW (ref 4.2–5.8)
RBC # FLD: 13.5 % — SIGNIFICANT CHANGE UP (ref 10.3–14.5)
SODIUM SERPL-SCNC: 140 MMOL/L — SIGNIFICANT CHANGE UP (ref 135–145)
TRIGL SERPL-MCNC: 56 MG/DL — SIGNIFICANT CHANGE UP
WBC # BLD: 7 K/UL — SIGNIFICANT CHANGE UP (ref 3.8–10.5)
WBC # FLD AUTO: 7 K/UL — SIGNIFICANT CHANGE UP (ref 3.8–10.5)

## 2024-04-26 PROCEDURE — 99231 SBSQ HOSP IP/OBS SF/LOW 25: CPT

## 2024-04-26 PROCEDURE — 99233 SBSQ HOSP IP/OBS HIGH 50: CPT

## 2024-04-26 PROCEDURE — 99232 SBSQ HOSP IP/OBS MODERATE 35: CPT

## 2024-04-26 RX ORDER — ENOXAPARIN SODIUM 100 MG/ML
40 INJECTION SUBCUTANEOUS EVERY 24 HOURS
Refills: 0 | Status: DISCONTINUED | OUTPATIENT
Start: 2024-04-26 | End: 2024-04-30

## 2024-04-26 RX ORDER — MAGNESIUM SULFATE 500 MG/ML
1 VIAL (ML) INJECTION ONCE
Refills: 0 | Status: COMPLETED | OUTPATIENT
Start: 2024-04-26 | End: 2024-04-26

## 2024-04-26 RX ADMIN — POLYETHYLENE GLYCOL 3350 17 GRAM(S): 17 POWDER, FOR SOLUTION ORAL at 11:55

## 2024-04-26 RX ADMIN — FAMOTIDINE 20 MILLIGRAM(S): 10 INJECTION INTRAVENOUS at 11:55

## 2024-04-26 RX ADMIN — EZETIMIBE 10 MILLIGRAM(S): 10 TABLET ORAL at 11:54

## 2024-04-26 RX ADMIN — Medication 100 GRAM(S): at 06:14

## 2024-04-26 RX ADMIN — Medication 63.75 MILLIMOLE(S): at 06:19

## 2024-04-26 RX ADMIN — ENOXAPARIN SODIUM 40 MILLIGRAM(S): 100 INJECTION SUBCUTANEOUS at 21:50

## 2024-04-26 RX ADMIN — TAMSULOSIN HYDROCHLORIDE 0.4 MILLIGRAM(S): 0.4 CAPSULE ORAL at 21:50

## 2024-04-26 RX ADMIN — SODIUM CHLORIDE 50 MILLILITER(S): 9 INJECTION INTRAMUSCULAR; INTRAVENOUS; SUBCUTANEOUS at 06:22

## 2024-04-26 RX ADMIN — ATORVASTATIN CALCIUM 40 MILLIGRAM(S): 80 TABLET, FILM COATED ORAL at 21:50

## 2024-04-26 NOTE — CHART NOTE - NSCHARTNOTEFT_GEN_A_CORE
Nutrition Note: Aware pt downgraded from ICU to medicine. Chart reviewed; RD to follow up with full nutrition assessment per medical floor protocol.

## 2024-04-26 NOTE — PROGRESS NOTE ADULT - SUBJECTIVE AND OBJECTIVE BOX
Subjective: Pt seen and evaluated at bedside.  Pt was ambulating to chair with PT and RN at time.  Pt did c/o of some Nausea at time of ambulation but denied any CP, palp, SOB, syncope or near syncope.    TELE: Rate controlled Atrial flutter with bradycardia episodes over night and pauses (all less then 3.5secs)    MEDICATIONS  (STANDING):  atorvastatin 40 milliGRAM(s) Oral at bedtime  chlorhexidine 2% Cloths 1 Application(s) Topical daily  enoxaparin Injectable 40 milliGRAM(s) SubCutaneous every 24 hours  ezetimibe 10 milliGRAM(s) Oral daily  famotidine    Tablet 20 milliGRAM(s) Oral daily  polyethylene glycol 3350 17 Gram(s) Oral daily  senna 2 Tablet(s) Oral at bedtime  tamsulosin 0.4 milliGRAM(s) Oral at bedtime    MEDICATIONS  (PRN):  acetaminophen   IVPB .. 1000 milliGRAM(s) IV Intermittent every 6 hours PRN Temp greater or equal to 38C (100.4F), Mild Pain (1 - 3)  hydrALAZINE Injectable 10 milliGRAM(s) IV Push every 2 hours PRN SBP >160      Allergies  clarithromycin (Swelling)        Vital Signs Last 24 Hrs  T(C): 36.7 (26 Apr 2024 07:11), Max: 36.8 (26 Apr 2024 03:35)  T(F): 98.1 (26 Apr 2024 07:11), Max: 98.2 (26 Apr 2024 03:35)  HR: 62 (26 Apr 2024 10:00) (36 - 73)  BP: 137/55 (26 Apr 2024 10:00) (103/49 - 150/76)  BP(mean): 76 (26 Apr 2024 10:00) (62 - 106)  RR: 20 (26 Apr 2024 10:00) (15 - 26)  SpO2: 100% (26 Apr 2024 10:00) (84% - 100%)    Parameters below as of 26 Apr 2024 08:00  Patient On (Oxygen Delivery Method): room air        Physical Exam:  Constitutional: NAD, AAOx3  Cardiovascular: +S1S2  Pulmonary: CTA b/l, unlabored  GI: soft NTND   Extremities: no pedal edema  Neuro: non focal, MURO x4    LABS:                        10.7   7.00  )-----------( 133      ( 26 Apr 2024 03:36 )             32.9     04-26    140  |  105  |  18.7  ----------------------------<  105<H>  4.0   |  23.0  |  0.66    Ca    8.2<L>      26 Apr 2024 03:36  Phos  2.7     04-26  Mg     1.9     04-26      PT/INR - ( 25 Apr 2024 03:55 )   PT: 13.8 sec;   INR: 1.25 ratio         PTT - ( 25 Apr 2024 03:55 )  PTT:27.6 sec  Urinalysis Basic - ( 26 Apr 2024 03:36 )    Color: x / Appearance: x / SG: x / pH: x  Gluc: 105 mg/dL / Ketone: x  / Bili: x / Urobili: x   Blood: x / Protein: x / Nitrite: x   Leuk Esterase: x / RBC: x / WBC x   Sq Epi: x / Non Sq Epi: x / Bacteria: x    A/P  84 year old male with GERD, HTN, HLD, CAD (PCI with JIMMIE to LAD 12/22), newly diagnosed a flutter (not on AC) and now with SAH.  Pt with 2 recent falls within past 2 weeks, with at least one being related to a syncopal episode, new onset Atrial flutter diagnosed about a week and a half ago on EKG at urgent care center and now with SAH after the most recent fall.   EP consulted as pt has been in Atrial Flutter with pauses on telemetry monitor since arriving at [facility].   Of note pt had been started on Toprol 25mg daily for which he took only 2 doses but does not know when the last dose was.  Pt does also state that he previously was on metoprolol years ago but was taken off of it but does not know why it was discontinued.  Pt also states that he was prescribed Eliquis after being diagnosed with Atrial flutter but never took it because of concern for bleeding risk.        Recommendations  -continue telemetry monitoring  -no indication for PPM at this time as pt is in typical Atrial flutter and pauses are all less then 5secs  -pt would benefit from SERGEY guided DCCV or MERARY ablation as a more definitive therapy for typical atrial flutter but would need to be on AC first.  Pt cleared to start AC on 4/30, can consider intervention after AC is started  -would hold all AV mariela blockers at this time  -possible ILR at discharge if pt does not have significant SND     Subjective: Pt seen and evaluated at bedside.  Pt was ambulating to chair with PT and RN at time.  Pt did c/o of some Nausea at time of ambulation but denied any CP, palp, SOB, syncope or near syncope.    TELE: Rate controlled Atrial flutter with bradycardia episodes over night and pauses (all less then 3.5secs)    MEDICATIONS  (STANDING):  atorvastatin 40 milliGRAM(s) Oral at bedtime  chlorhexidine 2% Cloths 1 Application(s) Topical daily  enoxaparin Injectable 40 milliGRAM(s) SubCutaneous every 24 hours  ezetimibe 10 milliGRAM(s) Oral daily  famotidine    Tablet 20 milliGRAM(s) Oral daily  polyethylene glycol 3350 17 Gram(s) Oral daily  senna 2 Tablet(s) Oral at bedtime  tamsulosin 0.4 milliGRAM(s) Oral at bedtime    MEDICATIONS  (PRN):  acetaminophen   IVPB .. 1000 milliGRAM(s) IV Intermittent every 6 hours PRN Temp greater or equal to 38C (100.4F), Mild Pain (1 - 3)  hydrALAZINE Injectable 10 milliGRAM(s) IV Push every 2 hours PRN SBP >160      Allergies  clarithromycin (Swelling)        Vital Signs Last 24 Hrs  T(C): 36.7 (26 Apr 2024 07:11), Max: 36.8 (26 Apr 2024 03:35)  T(F): 98.1 (26 Apr 2024 07:11), Max: 98.2 (26 Apr 2024 03:35)  HR: 62 (26 Apr 2024 10:00) (36 - 73)  BP: 137/55 (26 Apr 2024 10:00) (103/49 - 150/76)  BP(mean): 76 (26 Apr 2024 10:00) (62 - 106)  RR: 20 (26 Apr 2024 10:00) (15 - 26)  SpO2: 100% (26 Apr 2024 10:00) (84% - 100%)    Parameters below as of 26 Apr 2024 08:00  Patient On (Oxygen Delivery Method): room air        Physical Exam:  Constitutional: NAD, AAOx3  Cardiovascular: +S1S2  Pulmonary: CTA b/l, unlabored  GI: soft NTND   Extremities: no pedal edema  Neuro: non focal, MURO x4    LABS:                        10.7   7.00  )-----------( 133      ( 26 Apr 2024 03:36 )             32.9     04-26    140  |  105  |  18.7  ----------------------------<  105<H>  4.0   |  23.0  |  0.66    Ca    8.2<L>      26 Apr 2024 03:36  Phos  2.7     04-26  Mg     1.9     04-26      PT/INR - ( 25 Apr 2024 03:55 )   PT: 13.8 sec;   INR: 1.25 ratio         PTT - ( 25 Apr 2024 03:55 )  PTT:27.6 sec  Urinalysis Basic - ( 26 Apr 2024 03:36 )    Color: x / Appearance: x / SG: x / pH: x  Gluc: 105 mg/dL / Ketone: x  / Bili: x / Urobili: x   Blood: x / Protein: x / Nitrite: x   Leuk Esterase: x / RBC: x / WBC x   Sq Epi: x / Non Sq Epi: x / Bacteria: x    A/P  84 year old male with GERD, HTN, HLD, CAD (PCI with JIMMIE to LAD 12/22), newly diagnosed a flutter (not on AC) and now with SAH.  Pt with 2 recent falls within past 2 weeks, with at least one being related to a syncopal episode, new onset Atrial flutter diagnosed about a week and a half ago on EKG at urgent care center and now with SAH after the most recent fall.   EP consulted as pt has been in Atrial Flutter with pauses on telemetry monitor since arriving at [facility].   Of note pt had been started on Toprol 25mg daily for which he took only 2 doses but does not know when the last dose was.  Pt does also state that he previously was on metoprolol years ago but was taken off of it but does not know why it was discontinued.  Pt also states that he was prescribed Eliquis after being diagnosed with Atrial flutter but never took it because of concern for bleeding risk.        Recommendations  -continue telemetry monitoring  -no indication for PPM at this time as pt is in typical Atrial flutter and pauses are all less then 5secs  -pt would benefit from SERGEY guided DCCV or MERARY ablation as a more definitive therapy for typical atrial flutter but would need to be on AC first.  Pt cleared to start AC on 4/30, can consider intervention after AC is started vs outpt follow with procedure in the near future.  Also discussed potential for outpt LAAO device with Watchman work-up.    -Continue to hold all AV mariela blockers  -possible ILR at discharge if pt does not have significant SND  -Case D/w Dr Cardenas.  Further recommendations to follow

## 2024-04-26 NOTE — PROGRESS NOTE ADULT - SUBJECTIVE AND OBJECTIVE BOX
HPI:  Pt is 84 year old male with a PMH gerd, HTN, HLD, newly diagnosed a flutter, and cardiac stent (not on blood thinners) transferred from [facility] after a fall from standing height. [place] [facility] [place] and scans performed at 11:30am were significant for a SAH. When the pt was tx to [facility], the pt was seen and examined in the trauma bay. The pt was A&Ox3, speaking in full sentences, and had a GCS of 15. The pt stated that he was walking and "doesn't know what happened" but fell with (+) head strike, and that he recently fell prior on April 19th. Upon examination, the pt had a right parietal abrasion, ecchymosis on the right hip, and stated that he was seeing double. The pt denied headaches and stated that he currently does not take any blood thinners, despite being prescribed them. Recently he stated that his cardiologist started him on a new medication which he said "could be the reason I fell".  (23 Apr 2024 14:00)      INTERVAL HPI/OVERNIGHT EVENTS:  84y Male seen lying in bed at bedside. 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 POD#1 from angio which was negative for AV fistula. Reports persistent diplopia, otherwise feels well.       Vital Signs Last 24 Hrs  T(C): 36.5 (25 Apr 2024 23:50), Max: 37.3 (25 Apr 2024 04:04)  T(F): 97.7 (25 Apr 2024 23:50), Max: 99.1 (25 Apr 2024 04:04)  HR: 44 (25 Apr 2024 23:00) (36 - 73)  BP: 123/97 (25 Apr 2024 23:00) (103/49 - 156/64)  BP(mean): 106 (25 Apr 2024 23:00) (62 - 106)  RR: 21 (25 Apr 2024 23:00) (15 - 28)  SpO2: 95% (25 Apr 2024 23:00) (84% - 100%)    Parameters below as of 25 Apr 2024 20:00  Patient On (Oxygen Delivery Method): room air        PHYSICAL EXAM:  GENERAL: NAD, well-groomed, well-developed  HEAD: (+) Scalp abrasion  MENTAL STATUS: AAO x3; Awake, Opens eyes spontaneously, Appropriately conversant without aphasia, following simple commands  CRANIAL NERVES: PERRLA; EOMI; No facial asymmetry; facial sensation grossly intact to light touch b/l;  tongue midline  MOTOR: strength 5/5 B/L upper and lower extremities; sensation grossly intact all extremities  SKIN: Warm, dry; no rashes or lesions      LABS:                        10.4   6.33  )-----------( 135      ( 25 Apr 2024 19:30 )             32.0     04-25    138  |  105  |  20.3<H>  ----------------------------<  125<H>  3.9   |  22.0  |  0.81    Ca    7.9<L>      25 Apr 2024 19:30  Phos  2.6     04-25  Mg     2.1     04-25      PT/INR - ( 25 Apr 2024 03:55 )   PT: 13.8 sec;   INR: 1.25 ratio         PTT - ( 25 Apr 2024 03:55 )  PTT:27.6 sec  Urinalysis Basic - ( 25 Apr 2024 19:30 )    Color: x / Appearance: x / SG: x / pH: x  Gluc: 125 mg/dL / Ketone: x  / Bili: x / Urobili: x   Blood: x / Protein: x / Nitrite: x   Leuk Esterase: x / RBC: x / WBC x   Sq Epi: x / Non Sq Epi: x / Bacteria: x        04-24 @ 07:01 - 04-25 @ 07:00  --------------------------------------------------------  IN: 1975 mL / OUT: 1200 mL / NET: 775 mL    04-25 @ 07:01 - 04-26 @ 00:02  --------------------------------------------------------  IN: 475 mL / OUT: 800 mL / NET: -325 mL        RADIOLOGY & ADDITIONAL TESTS:  4/23 CTH: acute SAH    4/23 CT C-spine: No evidence of an acute cervical spine fracture.    4/23 6hr CTH: stable    4/23 CTA H/N: Prominent vasculature L perimesencephalic cistern/Ltentorium. Cannot rule out dural AV fistula    4/24 24 h CTH: Stable     4/24 TTE: EF 65 to 70 %, mild MR, trace pericardial effusion.     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 Now POD #1 from negative angio.     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

## 2024-04-26 NOTE — PROGRESS NOTE ADULT - SUBJECTIVE AND OBJECTIVE BOX
Neuro ICU Downgrade / Hospital course:   83 y/o M w/ PMH of gerd, HTN, HLD, CAD s/p PCI (not on DAPT anymore) initially presented to [facility] [place] after syncopa. episode w/ subsequent head trauma.  w/u at [facility] [place] was significant for SAH and was transfered to [facility] neuro ICU on 04/23/24.  On initial evaluation on arrival pt was c/o double vision but denied HA.          INTERVAL HPI/OVERNIGHT EVENTS:  84y Male seen lying in bed at bedside. 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 POD#1 from angio which was negative for AV fistula. Reports persistent diplopia, otherwise feels well.       SUBJECTIVE / OVERNIGHT EVENTS: No acute events reported overnight.  Pt offers no acute complaints a this time.       I&O's Summary    25 Apr 2024 07:01  -  26 Apr 2024 07:00  --------------------------------------------------------  IN: 875 mL / OUT: 800 mL / NET: 75 mL      PHYSICAL EXAM:  Vital Signs Last 24 Hrs  T(C): 36.7 (26 Apr 2024 07:11), Max: 36.8 (26 Apr 2024 03:35)  T(F): 98.1 (26 Apr 2024 07:11), Max: 98.2 (26 Apr 2024 03:35)  HR: 66 (26 Apr 2024 07:00) (36 - 73)  BP: 120/64 (26 Apr 2024 07:00) (103/49 - 156/64)  BP(mean): 80 (26 Apr 2024 07:00) (62 - 106)  RR: 24 (26 Apr 2024 07:00) (15 - 26)  SpO2: 98% (26 Apr 2024 07:00) (84% - 100%)    Parameters below as of 26 Apr 2024 08:00  Patient On (Oxygen Delivery Method): room air      GENERAL: pt examined bedside, laying comfortably in bed in NAD  HEENT: NC/AT, moist oral mucosa, clear conjunctiva, sclera nonicteric  RESPIRATORY: Normal respiratory effort, no wheezing, rhonchi, rales  CARDIOVASCULAR: irregularly irregular rate and rhythm, normal S1 and S2  ABDOMEN: soft, NT/ND, +bowel sounds, no rebound/guarding  EXTREMITIES: No cynaosis, no clubbing, no lower extremity edema  NEUROLOGY: A+O x3, no focal neurologic deficits appreciated   SKIN: No rashes or no palpable lesions        LABS:                        10.7   7.00  )-----------( 133      ( 26 Apr 2024 03:36 )             32.9     04-26    140  |  105  |  18.7  ----------------------------<  105<H>  4.0   |  23.0  |  0.66    Ca    8.2<L>      26 Apr 2024 03:36  Phos  2.7     04-26  Mg     1.9     04-26      PT/INR - ( 25 Apr 2024 03:55 )   PT: 13.8 sec;   INR: 1.25 ratio         PTT - ( 25 Apr 2024 03:55 )  PTT:27.6 sec      Urinalysis Basic - ( 26 Apr 2024 03:36 )    Color: x / Appearance: x / SG: x / pH: x  Gluc: 105 mg/dL / Ketone: x  / Bili: x / Urobili: x   Blood: x / Protein: x / Nitrite: x   Leuk Esterase: x / RBC: x / WBC x   Sq Epi: x / Non Sq Epi: x / Bacteria: x      < from: 12 Lead ECG (04.25.24 @ 07:44) >  Ventricular Rate 67 BPM  Atrial Rate 250 BPM  QTC Calculation(Bazett) 418 ms  Diagnosis Line Atrial flutter with variable A-V block    < end of copied text >        RADIOLOGY & ADDITIONAL TESTS:    < from: MR Head w/wo IV Cont (04.25.24 @ 00:56) >  IMPRESSION:    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    Subarachnoid hemorrhage again seen most prominent in the left   perimesencephalic region. Mild hypervascularity in this region is again   seen. Please see subsequent conventional angiography report.    < end of copied text >      < from: CT Head No Cont (04.24.24 @ 11:52) >  IMPRESSION: No change since 4/23/2024. Subarachnoid hemorrhage left   perimesencephalic and quadrigeminal plate cisterns and left frontal   cortex.    < end of copied text >    < from: CT Angio Neck w/ IV Cont (04.23.24 @ 17:53) >  IMPRESSION:  CT head:  -Stable left perimesencephalic cistern and left superior frontal   subarachnoid hemorrhage.  -No new or worsening intracranial hemorrhage.    CT angiogram head:  -Prominent vasculature in the left perimesencephalic cistern/left   tentorium. Differential includes reactive changes due to known hemorrhage   versus underlying dural AV fistula. Recommend neurointerventional   consultation to consider conventional angiography.    CT angiogram neck:  -No vaso-occlusive disease.    < end of copied text >      < from: TTE W or WO Ultrasound Enhancing Agent (04.24.24 @ 13:20) >  CONCLUSIONS:      1. Technically difficult image quality.   2. Left ventricular cavity is normal in size. Left ventricular systolic function is normal with an ejection fraction visually estimated at 65 to 70 %.   3. Analysis of left ventricular diastolic function and filling pressure is made challenging by the presence of atrial fibrillation.   4. Mildly enlarged right ventricular cavity size and normal systolic function.   5. The left atrium is normal in size.   6. The right atrium is normal in size.   7. Fibrocalcific aortic valve sclerosis without stenosis.   8. Mild mitral regurgitation.   9. Estimated pulmonary artery systolic pressure is 33 mmHg, normal pulmonary artery pressure.  10. Trace pericardial effusion.    < end of copied text >          MEDICATIONS  (STANDING):  atorvastatin 40 milliGRAM(s) Oral at bedtime  chlorhexidine 2% Cloths 1 Application(s) Topical daily  enoxaparin Injectable 40 milliGRAM(s) SubCutaneous every 24 hours  ezetimibe 10 milliGRAM(s) Oral daily  famotidine    Tablet 20 milliGRAM(s) Oral daily  polyethylene glycol 3350 17 Gram(s) Oral daily  senna 2 Tablet(s) Oral at bedtime  tamsulosin 0.4 milliGRAM(s) Oral at bedtime    MEDICATIONS  (PRN):  acetaminophen   IVPB .. 1000 milliGRAM(s) IV Intermittent every 6 hours PRN Temp greater or equal to 38C (100.4F), Mild Pain (1 - 3)  hydrALAZINE Injectable 10 milliGRAM(s) IV Push every 2 hours PRN SBP >160  labetalol Injectable 10 milliGRAM(s) IV Push every 2 hours PRN SBP >160                                   Neuro ICU Downgrade / Hospital course:   85 y/o M w/ PMH of gerd, HTN, HLD, CAD s/p PCI (not on DAPT despite it being prescribed to him) initially [place] to [facility] [place]opal episode w/ subsequent head trauma.  w/u at [facility] [place] was significant for SAH and was transferred to [facility] neuro ICU on 04/23/24.  On initial evaluation on arrival pt was c/o double vision but denied HA.  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  CTA h/n showed   prominent vasculature in the left perimesencephalic cistern/left tentorium of which differential included reactive changes due to known hemorrhage vs underlying dural AV fistula.  NeuroIR evaluated and pt underwent cerebral angiography on 04/24 and no dural AV fistula was found.  Hospital course complicated by new AFlutter.  EP was consulted and planning for possible cardioversion but after cardioversion pt would need to be on at least 1 month of uninteruppted AC.  Per neruoICU pt can not start AC until 04/30.  Lovenox for VTE ppx cleared to be started tonight as per neuroICU.  Pt medically stable for transfer to medical service for continued management        INTERVAL HPI/OVERNIGHT EVENTS:  84y Male seen lying in bed at bedside. 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 POD#1 from angio which was negative for AV fistula. Reports persistent diplopia, otherwise feels well.       SUBJECTIVE / OVERNIGHT EVENTS: No acute events reported overnight.  Pt offers no acute complaints a this time.       I&O's Summary    25 Apr 2024 07:01  -  26 Apr 2024 07:00  --------------------------------------------------------  IN: 875 mL / OUT: 800 mL / NET: 75 mL      PHYSICAL EXAM:  Vital Signs Last 24 Hrs  T(C): 36.7 (26 Apr 2024 07:11), Max: 36.8 (26 Apr 2024 03:35)  T(F): 98.1 (26 Apr 2024 07:11), Max: 98.2 (26 Apr 2024 03:35)  HR: 66 (26 Apr 2024 07:00) (36 - 73)  BP: 120/64 (26 Apr 2024 07:00) (103/49 - 156/64)  BP(mean): 80 (26 Apr 2024 07:00) (62 - 106)  RR: 24 (26 Apr 2024 07:00) (15 - 26)  SpO2: 98% (26 Apr 2024 07:00) (84% - 100%)    Parameters below as of 26 Apr 2024 08:00  Patient On (Oxygen Delivery Method): room air      GENERAL: pt examined bedside, laying comfortably in bed in NAD  HEENT: NC/AT, moist oral mucosa, clear conjunctiva, sclera nonicteric  RESPIRATORY: Normal respiratory effort, no wheezing, rhonchi, rales  CARDIOVASCULAR: irregularly irregular rate and rhythm, normal S1 and S2  ABDOMEN: soft, NT/ND, +bowel sounds, no rebound/guarding  EXTREMITIES: No cynaosis, no clubbing, no lower extremity edema  NEUROLOGY: A+O x3, no focal neurologic deficits appreciated   SKIN: No rashes or no palpable lesions        LABS:                        10.7   7.00  )-----------( 133      ( 26 Apr 2024 03:36 )             32.9     04-26    140  |  105  |  18.7  ----------------------------<  105<H>  4.0   |  23.0  |  0.66    Ca    8.2<L>      26 Apr 2024 03:36  Phos  2.7     04-26  Mg     1.9     04-26      PT/INR - ( 25 Apr 2024 03:55 )   PT: 13.8 sec;   INR: 1.25 ratio         PTT - ( 25 Apr 2024 03:55 )  PTT:27.6 sec      Urinalysis Basic - ( 26 Apr 2024 03:36 )    Color: x / Appearance: x / SG: x / pH: x  Gluc: 105 mg/dL / Ketone: x  / Bili: x / Urobili: x   Blood: x / Protein: x / Nitrite: x   Leuk Esterase: x / RBC: x / WBC x   Sq Epi: x / Non Sq Epi: x / Bacteria: x      < from: 12 Lead ECG (04.25.24 @ 07:44) >  Ventricular Rate 67 BPM  Atrial Rate 250 BPM  QTC Calculation(Bazett) 418 ms  Diagnosis Line Atrial flutter with variable A-V block    < end of copied text >        RADIOLOGY & ADDITIONAL TESTS:    < from: MR Head w/wo IV Cont (04.25.24 @ 00:56) >  IMPRESSION:    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    Subarachnoid hemorrhage again seen most prominent in the left   perimesencephalic region. Mild hypervascularity in this region is again   seen. Please see subsequent conventional angiography report.    < end of copied text >      < from: CT Head No Cont (04.24.24 @ 11:52) >  IMPRESSION: No change since 4/23/2024. Subarachnoid hemorrhage left   perimesencephalic and quadrigeminal plate cisterns and left frontal   cortex.    < end of copied text >    < from: CT Angio Neck w/ IV Cont (04.23.24 @ 17:53) >  IMPRESSION:  CT head:  -Stable left perimesencephalic cistern and left superior frontal   subarachnoid hemorrhage.  -No new or worsening intracranial hemorrhage.    CT angiogram head:  -Prominent vasculature in the left perimesencephalic cistern/left   tentorium. Differential includes reactive changes due to known hemorrhage   versus underlying dural AV fistula. Recommend neurointerventional   consultation to consider conventional angiography.    CT angiogram neck:  -No vaso-occlusive disease.    < end of copied text >      < from: TTE W or WO Ultrasound Enhancing Agent (04.24.24 @ 13:20) >  CONCLUSIONS:      1. Technically difficult image quality.   2. Left ventricular cavity is normal in size. Left ventricular systolic function is normal with an ejection fraction visually estimated at 65 to 70 %.   3. Analysis of left ventricular diastolic function and filling pressure is made challenging by the presence of atrial fibrillation.   4. Mildly enlarged right ventricular cavity size and normal systolic function.   5. The left atrium is normal in size.   6. The right atrium is normal in size.   7. Fibrocalcific aortic valve sclerosis without stenosis.   8. Mild mitral regurgitation.   9. Estimated pulmonary artery systolic pressure is 33 mmHg, normal pulmonary artery pressure.  10. Trace pericardial effusion.    < end of copied text >          MEDICATIONS  (STANDING):  atorvastatin 40 milliGRAM(s) Oral at bedtime  chlorhexidine 2% Cloths 1 Application(s) Topical daily  enoxaparin Injectable 40 milliGRAM(s) SubCutaneous every 24 hours  ezetimibe 10 milliGRAM(s) Oral daily  famotidine    Tablet 20 milliGRAM(s) Oral daily  polyethylene glycol 3350 17 Gram(s) Oral daily  senna 2 Tablet(s) Oral at bedtime  tamsulosin 0.4 milliGRAM(s) Oral at bedtime    MEDICATIONS  (PRN):  acetaminophen   IVPB .. 1000 milliGRAM(s) IV Intermittent every 6 hours PRN Temp greater or equal to 38C (100.4F), Mild Pain (1 - 3)  hydrALAZINE Injectable 10 milliGRAM(s) IV Push every 2 hours PRN SBP >160  labetalol Injectable 10 milliGRAM(s) IV Push every 2 hours PRN SBP >160                                   Neuro ICU Downgrade / Hospital course:   85 y/o M w/ PMH of gerd, HTN, HLD, CAD s/p PCI (not on DAPT despite it being prescribed to him) initially [place] to [facility] [place]opal episode w/ subsequent head trauma.  w[place] at [facility] [place] was significant for SAH and was transferred to [facility] neuro ICU on 04/23/24.  On initial evaluation on arrival pt was c/o double vision but denied HA.  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  CTA h/n showed prominent vasculature in the left perimesencephalic cistern/left tentorium of which differential included reactive changes due to known hemorrhage vs underlying dural AV fistula.  NeuroIR evaluated and pt underwent cerebral angiography on 04/24 and no dural AV fistula was found.  Hospital course complicated by new AFlutter.  EP was consulted and planning for possible cardioversion but after cardioversion pt would need to be on at least 1 month of uninteruppted AC.  Per neruoICU pt can not start AC until 04/30.  Lovenox for VTE ppx cleared to be started tonight as per neuroICU.  Pt medically stable for transfer to medical service for continued management        INTERVAL HPI/OVERNIGHT EVENTS:  84y Male seen lying in bed at bedside. 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 POD#1 from angio which was negative for AV fistula. Reports persistent diplopia, otherwise feels well.       SUBJECTIVE / OVERNIGHT EVENTS: No acute events reported overnight.  Pt offers no acute complaints a this time.       I&O's Summary    25 Apr 2024 07:01  -  26 Apr 2024 07:00  --------------------------------------------------------  IN: 875 mL / OUT: 800 mL / NET: 75 mL      PHYSICAL EXAM:  Vital Signs Last 24 Hrs  T(C): 36.7 (26 Apr 2024 07:11), Max: 36.8 (26 Apr 2024 03:35)  T(F): 98.1 (26 Apr 2024 07:11), Max: 98.2 (26 Apr 2024 03:35)  HR: 66 (26 Apr 2024 07:00) (36 - 73)  BP: 120/64 (26 Apr 2024 07:00) (103/49 - 156/64)  BP(mean): 80 (26 Apr 2024 07:00) (62 - 106)  RR: 24 (26 Apr 2024 07:00) (15 - 26)  SpO2: 98% (26 Apr 2024 07:00) (84% - 100%)    Parameters below as of 26 Apr 2024 08:00  Patient On (Oxygen Delivery Method): room air      GENERAL: pt examined bedside, laying comfortably in bed in NAD  HEENT: NC/AT, moist oral mucosa, clear conjunctiva, sclera nonicteric  RESPIRATORY: Normal respiratory effort, no wheezing, rhonchi, rales  CARDIOVASCULAR: irregularly irregular rate and rhythm, normal S1 and S2  ABDOMEN: soft, NT/ND, +bowel sounds, no rebound/guarding  EXTREMITIES: No cynaosis, no clubbing, no lower extremity edema  NEUROLOGY: A+O x3, no focal neurologic deficits appreciated   SKIN: No rashes or no palpable lesions        LABS:                        10.7   7.00  )-----------( 133      ( 26 Apr 2024 03:36 )             32.9     04-26    140  |  105  |  18.7  ----------------------------<  105<H>  4.0   |  23.0  |  0.66    Ca    8.2<L>      26 Apr 2024 03:36  Phos  2.7     04-26  Mg     1.9     04-26      PT/INR - ( 25 Apr 2024 03:55 )   PT: 13.8 sec;   INR: 1.25 ratio         PTT - ( 25 Apr 2024 03:55 )  PTT:27.6 sec      Urinalysis Basic - ( 26 Apr 2024 03:36 )    Color: x / Appearance: x / SG: x / pH: x  Gluc: 105 mg/dL / Ketone: x  / Bili: x / Urobili: x   Blood: x / Protein: x / Nitrite: x   Leuk Esterase: x / RBC: x / WBC x   Sq Epi: x / Non Sq Epi: x / Bacteria: x      < from: 12 Lead ECG (04.25.24 @ 07:44) >  Ventricular Rate 67 BPM  Atrial Rate 250 BPM  QTC Calculation(Bazett) 418 ms  Diagnosis Line Atrial flutter with variable A-V block    < end of copied text >        RADIOLOGY & ADDITIONAL TESTS:    < from: MR Head w/wo IV Cont (04.25.24 @ 00:56) >  IMPRESSION:    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    Subarachnoid hemorrhage again seen most prominent in the left   perimesencephalic region. Mild hypervascularity in this region is again   seen. Please see subsequent conventional angiography report.    < end of copied text >      < from: CT Head No Cont (04.24.24 @ 11:52) >  IMPRESSION: No change since 4/23/2024. Subarachnoid hemorrhage left   perimesencephalic and quadrigeminal plate cisterns and left frontal   cortex.    < end of copied text >    < from: CT Angio Neck w/ IV Cont (04.23.24 @ 17:53) >  IMPRESSION:  CT head:  -Stable left perimesencephalic cistern and left superior frontal   subarachnoid hemorrhage.  -No new or worsening intracranial hemorrhage.    CT angiogram head:  -Prominent vasculature in the left perimesencephalic cistern/left   tentorium. Differential includes reactive changes due to known hemorrhage   versus underlying dural AV fistula. Recommend neurointerventional   consultation to consider conventional angiography.    CT angiogram neck:  -No vaso-occlusive disease.    < end of copied text >      < from: TTE W or WO Ultrasound Enhancing Agent (04.24.24 @ 13:20) >  CONCLUSIONS:      1. Technically difficult image quality.   2. Left ventricular cavity is normal in size. Left ventricular systolic function is normal with an ejection fraction visually estimated at 65 to 70 %.   3. Analysis of left ventricular diastolic function and filling pressure is made challenging by the presence of atrial fibrillation.   4. Mildly enlarged right ventricular cavity size and normal systolic function.   5. The left atrium is normal in size.   6. The right atrium is normal in size.   7. Fibrocalcific aortic valve sclerosis without stenosis.   8. Mild mitral regurgitation.   9. Estimated pulmonary artery systolic pressure is 33 mmHg, normal pulmonary artery pressure.  10. Trace pericardial effusion.    < end of copied text >          MEDICATIONS  (STANDING):  atorvastatin 40 milliGRAM(s) Oral at bedtime  chlorhexidine 2% Cloths 1 Application(s) Topical daily  enoxaparin Injectable 40 milliGRAM(s) SubCutaneous every 24 hours  ezetimibe 10 milliGRAM(s) Oral daily  famotidine    Tablet 20 milliGRAM(s) Oral daily  polyethylene glycol 3350 17 Gram(s) Oral daily  senna 2 Tablet(s) Oral at bedtime  tamsulosin 0.4 milliGRAM(s) Oral at bedtime    MEDICATIONS  (PRN):  acetaminophen   IVPB .. 1000 milliGRAM(s) IV Intermittent every 6 hours PRN Temp greater or equal to 38C (100.4F), Mild Pain (1 - 3)  hydrALAZINE Injectable 10 milliGRAM(s) IV Push every 2 hours PRN SBP >160  labetalol Injectable 10 milliGRAM(s) IV Push every 2 hours PRN SBP >160

## 2024-04-26 NOTE — CHART NOTE - NSCHARTNOTESELECT_GEN_ALL_CORE
Event Note
Neurointerventional Surgery Pre-Procedure Note/Event Note
Nutrition Services
Transfer Note
Neurointerventional Surgery Post-Procedure Note/Event Note
Tertiary/Event Note
transfer/Transfer Note

## 2024-04-26 NOTE — CHART NOTE - NSCHARTNOTEFT_GEN_A_CORE
NSICU Transfer Note    Transfer from: NSICU    Transfer to: (  ) Medicine    (  ) Telemetry    (  ) RCU   ( ) Neurosurgery                               (  ) Palliative    (  ) Stroke Unit    (  ) MICU    (  ) __________________    Accepting Physician:    Signout given to:     HPI / NSCU COURSE: 84M tx from [facility] for syncopal fall at home, patient states that he had fall while getting coffee, on ASA 81mg    : [facility]  : Transferred to NSICU service. Anushka, cardio/EP want defib procedure but would require AC, will discuss after angio tomorrow   : Angio Negative for AV fistula. Lico- intermittent aflutter   : Stable for DG, eye patch ordered          Vital Signs Last 24 Hrs  T(C): 36.8 (2024 03:35), Max: 36.8 (2024 03:35)  T(F): 98.2 (2024 03:35), Max: 98.2 (2024 03:35)  HR: 59 (2024 06:00) (36 - 73)  BP: 122/52 (2024 06:00) (103/49 - 156/64)  BP(mean): 71 (2024 06:00) (62 - 106)  RR: 25 (2024 06:00) (15 - 26)  SpO2: 99% (2024 06:00) (84% - 100%)    Parameters below as of 2024 04:00  Patient On (Oxygen Delivery Method): room air        I&O's Summary    2024 07:01  -  2024 07:00  --------------------------------------------------------  IN: 1975 mL / OUT: 1200 mL / NET: 775 mL    2024 07:01  -  2024 06:44  --------------------------------------------------------  IN: 875 mL / OUT: 800 mL / NET: 75 mL        Physical Exam:   AAOX3, Intact, c/o Diplopia,  follows commands, EOMI, PERRLA, motor - MURO 5/5 with no drift, sensation intact.  S1S2 present.  CTAB,   Abd soft, NT, ND.  No peripheral swelling.    LABS:                               10.7   7.00  )-----------( 133      ( 2024 03:36 )             32.9       04-26    140  |  105  |  18.7  ----------------------------<  105<H>  4.0   |  23.0  |  0.66    Ca    8.2<L>      2024 03:36  Phos  2.7     -  Mg     1.9     -        PT/INR - ( 2024 03:55 )   PT: 13.8 sec;   INR: 1.25 ratio         PTT - ( 2024 03:55 )  PTT:27.6 sec        Imagin/23 CTH: acute SAH     CT C-spine: No evidence of an acute cervical spine fracture.     6hr CTH: stable     CTA H/N: Prominent vasculature L perimesencephalic cistern/Ltentorium. Cannot rule out dural AV fistula     24 h CTH: Stable      TTE: EF 65 to 70 %, mild MR, trace pericardial effusion.      MRI Brain: L dorsal camila infarct      ASSESSMENT & PLAN: 84M tx from [facility] for syncopal fall at home, patient states that he had fall while getting coffee, on ASA 81mg now s/p negative angio. NSICU Transfer Note    Transfer from: NSICU    Transfer to: (  ) Medicine    (  ) Telemetry    (  ) RCU   ( ) Neurosurgery                               (  ) Palliative    (  ) Stroke Unit    (  ) MICU    (  ) __________________    Accepting Physician:    Signout given to:     HPI / NSCU COURSE: 84M tx from [facility] for syncopal fall at home, patient states that he had fall while getting coffee, on ASA 81mg    : [facility]  : Transferred to NSICU service. Anushka, cardio/EP want defib procedure but would require AC, will discuss after angio tomorrow   : Angio Negative for AV fistula. Lico- intermittent aflutter   : Stable for DG, eye patch ordered          Vital Signs Last 24 Hrs  T(C): 36.8 (2024 03:35), Max: 36.8 (2024 03:35)  T(F): 98.2 (2024 03:35), Max: 98.2 (2024 03:35)  HR: 59 (2024 06:00) (36 - 73)  BP: 122/52 (2024 06:00) (103/49 - 156/64)  BP(mean): 71 (2024 06:00) (62 - 106)  RR: 25 (2024 06:00) (15 - 26)  SpO2: 99% (2024 06:00) (84% - 100%)    Parameters below as of 2024 04:00  Patient On (Oxygen Delivery Method): room air        I&O's Summary    2024 07:01  -  2024 07:00  --------------------------------------------------------  IN: 1975 mL / OUT: 1200 mL / NET: 775 mL    2024 07:01  -  2024 06:44  --------------------------------------------------------  IN: 875 mL / OUT: 800 mL / NET: 75 mL        Physical Exam:   AAOX3, Intact, c/o Diplopia,  follows commands, EOMI, PERRLA, motor - MURO 5/5 with no drift, sensation intact.  S1S2 present.  CTAB,   Abd soft, NT, ND.  No peripheral swelling.    LABS:                               10.7   7.00  )-----------( 133      ( 2024 03:36 )             32.9       04-26    140  |  105  |  18.7  ----------------------------<  105<H>  4.0   |  23.0  |  0.66    Ca    8.2<L>      2024 03:36  Phos  2.7     04-  Mg     1.9     -      PT/INR - ( 2024 03:55 )   PT: 13.8 sec;   INR: 1.25 ratio    PTT - ( 2024 03:55 )  PTT:27.6 sec        Imagin/23 CTH: acute SAH     CT C-spine: No evidence of an acute cervical spine fracture.     6hr CTH: stable     CTA H/N: Prominent vasculature L perimesencephalic cistern/L tentorium. Cannot rule out dural AV fistula     24 h CTH: Stable      TTE: EF 65 to 70 %, mild MR, trace pericardial effusion.      MRI Brain: L dorsal camila infarct      ASSESSMENT & PLAN: 84M tx from [facility] for syncopal fall at home, patient states that he had fall while getting coffee, on ASA 81mg now s/p negative angio.    PLAN  - Neurochecks with post-DSA groin checks  - HOLD APA/AC for now, initiate when cleared from NSGY  - cont Statin  - Aflutter with possible AV dysfunction/block, appreciate cards recs  - Avoid AV node blockers  - SBP <160  - SpO2 > 94% on RA  - Diet with bowel regimen as tolerated  - Cont Flomax  - DVT PPx: SCD, resume SQL tomorrow  - Eye patching NSICU Transfer Note    Transfer from: NSICU    Transfer to: (X ) Medicine    ( X ) Telemetry        Accepting Physician: Dr. Rincon    Signout given to: Dr. Rincon    [facility] / NSICU COURSE: 84M tx from [facility] [place] for syncopal fall at home, patient states that he had fall while getting coffee, on ASA 81mg    : Transferred from [facility] [place], [facility] Stable  : Transferred to NSICU service. Aflutter, cardio/EP want defib procedure but would require AC, will discuss after angio tomorrow   : Angio Negative for AV fistula. Lico- intermittent aflutter   : Stable for DG, eye patch ordered          Vital Signs Last 24 Hrs  T(C): 36.8 (2024 03:35), Max: 36.8 (2024 03:35)  T(F): 98.2 (2024 03:35), Max: 98.2 (2024 03:35)  HR: 59 (2024 06:00) (36 - 73)  BP: 122/52 (2024 06:00) (103/49 - 156/64)  BP(mean): 71 (2024 06:00) (62 - 106)  RR: 25 (2024 06:00) (15 - 26)  SpO2: 99% (2024 06:00) (84% - 100%)    Parameters below as of 2024 04:00  Patient On (Oxygen Delivery Method): room air        I&O's Summary    2024 07:  -  2024 07:00  --------------------------------------------------------  IN: 1975 mL / OUT: 1200 mL / NET: 775 mL    2024 07:01  -  2024 06:44  --------------------------------------------------------  IN: 875 mL / OUT: 800 mL / NET: 75 mL        Physical Exam:   AAOX3, Intact, c/o Diplopia,  follows commands, EOMI, PERRLA, motor - MURO 5/5 with no drift, sensation intact.  Irregular rhythm.  CTAB,   Abd soft, NT, ND.  No peripheral swelling.    LABS:                               10.7   7.00  )-----------( 133      ( 2024 03:36 )             32.9       04-26    140  |  105  |  18.7  ----------------------------<  105<H>  4.0   |  23.0  |  0.66    Ca    8.2<L>      2024 03:36  Phos  2.7       Mg     1.9           PT/INR - ( 2024 03:55 )   PT: 13.8 sec;   INR: 1.25 ratio    PTT - ( 2024 03:55 )  PTT:27.6 sec        Imagin/23 CTH: acute SAH     CT C-spine: No evidence of an acute cervical spine fracture.     6hr CTH: stable     CTA H/N: Prominent vasculature L perimesencephalic cistern/L tentorium. Cannot rule out dural AV fistula     24 h CTH: Stable      TTE: EF 65 to 70 %, mild MR, trace pericardial effusion.      MRI Brain: L dorsal camila infarct      ASSESSMENT & PLAN: 84M tx from [facility] for syncopal fall at home, patient states that he had fall while getting coffee, on ASA 81mg now s/p negative angio.    PLAN  - Q4 neuro checks  - HOLD APA/AC for now, initiate when cleared from NSGY-one week after fall  - cont Statin  - Aflutter with possible AV dysfunction/block, appreciate EP & cards recs  - Avoid AV node blockers  - SBP <160  - SpO2 > 94% on RA  - Diet with bowel regimen as tolerated  - Cont Flomax  - DVT PPx: SCD, resume SQL tonight  - Eye patching NSICU Transfer Note    Transfer from: NSICU    Transfer to: (X ) Medicine    ( X ) Telemetry        Accepting Physician: Dr. Rincon    Signout given to: Dr. Rincon    [facility] / NSICU COURSE: 84M tx from [facility] for syncopal fall at home, patient states that he had fall while getting coffee, on ASA 81mg    : Transferred from [facility], [facility] Stable  : Transferred to NSICU service. Aflutter, cardio/EP want defib procedure but would require AC, will discuss after angio tomorrow   : Angio Negative for AV fistula. Lico- intermittent aflutter   : Stable for DG, eye patch ordered          Vital Signs Last 24 Hrs  T(C): 36.8 (2024 03:35), Max: 36.8 (2024 03:35)  T(F): 98.2 (2024 03:35), Max: 98.2 (2024 03:35)  HR: 59 (2024 06:00) (36 - 73)  BP: 122/52 (2024 06:00) (103/49 - 156/64)  BP(mean): 71 (2024 06:00) (62 - 106)  RR: 25 (2024 06:00) (15 - 26)  SpO2: 99% (2024 06:00) (84% - 100%)    Parameters below as of 2024 04:00  Patient On (Oxygen Delivery Method): room air        I&O's Summary    2024 07:  -  2024 07:00  --------------------------------------------------------  IN: 1975 mL / OUT: 1200 mL / NET: 775 mL    2024 07:01  -  2024 06:44  --------------------------------------------------------  IN: 875 mL / OUT: 800 mL / NET: 75 mL        Physical Exam:   AAOX3, Intact, c/o Diplopia,  follows commands, EOMI, PERRLA, motor - MURO 5/5 with no drift, sensation intact.  Irregular rhythm.  CTAB,   Abd soft, NT, ND.  No peripheral swelling.    LABS:                               10.7   7.00  )-----------( 133      ( 2024 03:36 )             32.9       04-26    140  |  105  |  18.7  ----------------------------<  105<H>  4.0   |  23.0  |  0.66    Ca    8.2<L>      2024 03:36  Phos  2.7       Mg     1.9           PT/INR - ( 2024 03:55 )   PT: 13.8 sec;   INR: 1.25 ratio    PTT - ( 2024 03:55 )  PTT:27.6 sec        Imagin/23 CTH: acute SAH     CT C-spine: No evidence of an acute cervical spine fracture.     6hr CTH: stable     CTA H/N: Prominent vasculature L perimesencephalic cistern/L tentorium. Cannot rule out dural AV fistula     24 h CTH: Stable      TTE: EF 65 to 70 %, mild MR, trace pericardial effusion.      MRI Brain: L dorsal camila infarct      ASSESSMENT & PLAN: 84M tx from [facility] for syncopal fall at home, patient states that he had fall while getting coffee, on ASA 81mg now s/p negative angio.    PLAN  - Q4 neuro checks  - HOLD APA/AC for now, initiate when cleared from NSGY-one week after fall  - cont Statin  - Aflutter with possible AV dysfunction/block, appreciate EP & cards recs  - Avoid AV node blockers  - SBP <160  - SpO2 > 94% on RA  - Diet with bowel regimen as tolerated  - Cont Flomax  - DVT PPx: SCD, resume SQL tonight  - Eye patching  - Pt to follow up with Dr. Gaudencio Junior as outpt in 2 weeks

## 2024-04-26 NOTE — PROGRESS NOTE ADULT - ASSESSMENT
Neuro ICU Downgrade / Hospital course:   83 y/o M w/ PMH of gerd, HTN, HLD, CAD s/p PCI (not on DAPT despite it being prescribed to him) initially presented to [facility] after syncopal episode w/ subsequent head trauma.  w/u at [facility] was significant for SAH and was transferred to [facility] neuro ICU on 04/23/24.  On initial evaluation on arrival pt was c/o double vision but denied HA.  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  CTA h/n showed   prominent vasculature in the left perimesencephalic cistern/left tentorium of which differential included reactive changes due to known hemorrhage vs underlying dural AV fistula.  NeuroIR evaluated and pt underwent cerebral angiography on 04/24 and no dural AV fistula was found.  Hospital course complicated by new AFlutter.  EP was consulted and planning for possible cardioversion but after cardioversion pt would need to be on at least 1 month of uninteruppted AC.  Per neruoICU pt can not start AC until 04/30.  Lovenox for VTE ppx cleared to be started tonight as per neuroICU.  Pt medically stable for transfer to medical service for continued management   Neuro ICU Downgrade / Hospital course:   83 y/o M w/ PMH of gerd, HTN, HLD, CAD s/p PCI (not on DAPT despite it being prescribed to him) initially presented to [facility] after syncopal episode w/ subsequent head trauma.  w/u at [facility] was significant for SAH and was transferred to [facility] neuro ICU on 04/23/24.  On initial evaluation on arrival pt was c/o double vision but denied HA.  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  CTA h/n showed prominent vasculature in the left perimesencephalic cistern/left tentorium of which differential included reactive changes due to known hemorrhage vs underlying dural AV fistula.  NeuroIR evaluated and pt underwent cerebral angiography on 04/24 and no dural AV fistula was found.  Hospital course complicated by new AFlutter.  EP was consulted and planning for possible cardioversion but after cardioversion pt would need to be on at least 1 month of uninterrupted AC.  Per neruoICU pt can not start AC until 04/30.  Lovenox for VTE ppx cleared to be started tonight as per neuroICU.  Pt medically stable for transfer to medical service for continued management .      Traumatic SAH and L-dorsal camila infarct   - Eye patch ordered for persistent double vision  - Repeat CTH w/ stable SAH   - CTA h/n w/ possible dural AV fistula   - s/p cerebral angiography on 04/24 w/ no evidence of dural AV fistula  - MRI brain w/ small acute infarct in the left dorsal camila and SAH in most prominent in the left perimesencephalic region  - TTE 4/2024 w/ LVEF 60-65%  - On ASA 81mg and cleared to start lovenox tonight for VTE ppx   - Can not start AC until 04/30 as per neuroICU  - c/w statin therapy   - Goal SBP <160  - Fall precautions   - c/w Neuro checks and VS q4h   - PT consulted and recommending home PT w/ assist   - Continue to monitor on telemetry   - Neurosurg recs noted       Syncope likely 2/2 arrhythmia vs CVA   - Found to be in new Aflutter w/ intermittent bradycardia  - Concern for possible AV dysfunction/block   - Avoid AVN blockers   - No indication for PPM as pt is in Aflutter and pauses are <5 sec as per EP  - EP planning for possible DCCV vs ablation but would then need to be on uninterrupted AC for at least 30 days   - Earliest pt cleared to start AC would be 4/30/24  - Possible ILR prior to d/c as per EP   - TTE w/ LVEF 60-65% w/ no significant valvulopathy or evidence of WMA  - Monitor on telemetry       Normocytic anemia / mild thrombocytopenia   - H/H and plts remain stable   - TSH, B12 and folate WNL  - Hemodynamically stable and no active bleeding   - Monitor CBC and transfuse as needed       HLD / HTN  - Not on standing antihypertensives at this time   - Goal SBP <160  - c/w statin and ezetimibe      BPH  - c/w flomax       VTE ppx: cleared to start lovenox tonight

## 2024-04-26 NOTE — OCCUPATIONAL THERAPY INITIAL EVALUATION ADULT - PERTINENT HX OF CURRENT PROBLEM, REHAB EVAL
Pt was transferred from [facility]; s/p fall +headstrike; pt and wife report that pt has fallen twice over the past few days prior to admission

## 2024-04-26 NOTE — OCCUPATIONAL THERAPY INITIAL EVALUATION ADULT - GROOMING, PREVIOUS LEVEL OF FUNCTION, OT EVAL
Patient's guardian called stating that the patient is experiencing throat irritation and laryngitis which she believes to be related to the adavir. They are requesting a change in the medication. Please advise.     [facility]

## 2024-04-27 DIAGNOSIS — I60.9 NONTRAUMATIC SUBARACHNOID HEMORRHAGE, UNSPECIFIED: ICD-10-CM

## 2024-04-27 LAB
ANION GAP SERPL CALC-SCNC: 6 MMOL/L — SIGNIFICANT CHANGE UP (ref 5–17)
BUN SERPL-MCNC: 19.5 MG/DL — SIGNIFICANT CHANGE UP (ref 8–20)
CALCIUM SERPL-MCNC: 8.3 MG/DL — LOW (ref 8.4–10.5)
CHLORIDE SERPL-SCNC: 105 MMOL/L — SIGNIFICANT CHANGE UP (ref 96–108)
CO2 SERPL-SCNC: 27 MMOL/L — SIGNIFICANT CHANGE UP (ref 22–29)
CREAT SERPL-MCNC: 0.79 MG/DL — SIGNIFICANT CHANGE UP (ref 0.5–1.3)
EGFR: 88 ML/MIN/1.73M2 — SIGNIFICANT CHANGE UP
GLUCOSE SERPL-MCNC: 110 MG/DL — HIGH (ref 70–99)
HCT VFR BLD CALC: 31.4 % — LOW (ref 39–50)
HGB BLD-MCNC: 10.3 G/DL — LOW (ref 13–17)
MAGNESIUM SERPL-MCNC: 2.1 MG/DL — SIGNIFICANT CHANGE UP (ref 1.6–2.6)
MCHC RBC-ENTMCNC: 28.4 PG — SIGNIFICANT CHANGE UP (ref 27–34)
MCHC RBC-ENTMCNC: 32.8 GM/DL — SIGNIFICANT CHANGE UP (ref 32–36)
MCV RBC AUTO: 86.5 FL — SIGNIFICANT CHANGE UP (ref 80–100)
MRSA PCR RESULT.: SIGNIFICANT CHANGE UP
PHOSPHATE SERPL-MCNC: 2.7 MG/DL — SIGNIFICANT CHANGE UP (ref 2.4–4.7)
PLATELET # BLD AUTO: 147 K/UL — LOW (ref 150–400)
POTASSIUM SERPL-MCNC: 4.5 MMOL/L — SIGNIFICANT CHANGE UP (ref 3.5–5.3)
POTASSIUM SERPL-SCNC: 4.5 MMOL/L — SIGNIFICANT CHANGE UP (ref 3.5–5.3)
RBC # BLD: 3.63 M/UL — LOW (ref 4.2–5.8)
RBC # FLD: 13.4 % — SIGNIFICANT CHANGE UP (ref 10.3–14.5)
S AUREUS DNA NOSE QL NAA+PROBE: SIGNIFICANT CHANGE UP
SODIUM SERPL-SCNC: 138 MMOL/L — SIGNIFICANT CHANGE UP (ref 135–145)
WBC # BLD: 6.85 K/UL — SIGNIFICANT CHANGE UP (ref 3.8–10.5)
WBC # FLD AUTO: 6.85 K/UL — SIGNIFICANT CHANGE UP (ref 3.8–10.5)

## 2024-04-27 PROCEDURE — 99233 SBSQ HOSP IP/OBS HIGH 50: CPT

## 2024-04-27 PROCEDURE — 99232 SBSQ HOSP IP/OBS MODERATE 35: CPT

## 2024-04-27 RX ADMIN — ATORVASTATIN CALCIUM 40 MILLIGRAM(S): 80 TABLET, FILM COATED ORAL at 22:27

## 2024-04-27 RX ADMIN — ENOXAPARIN SODIUM 40 MILLIGRAM(S): 100 INJECTION SUBCUTANEOUS at 18:19

## 2024-04-27 RX ADMIN — CHLORHEXIDINE GLUCONATE 1 APPLICATION(S): 213 SOLUTION TOPICAL at 11:31

## 2024-04-27 RX ADMIN — TAMSULOSIN HYDROCHLORIDE 0.4 MILLIGRAM(S): 0.4 CAPSULE ORAL at 22:26

## 2024-04-27 RX ADMIN — EZETIMIBE 10 MILLIGRAM(S): 10 TABLET ORAL at 11:29

## 2024-04-27 RX ADMIN — FAMOTIDINE 20 MILLIGRAM(S): 10 INJECTION INTRAVENOUS at 11:30

## 2024-04-27 NOTE — PROGRESS NOTE ADULT - ASSESSMENT
85 y/o M w/ PMH of gerd, HTN, HLD, CAD s/p PCI (not on DAPT despite it being prescribed to him) initially presented to [facility] [place] after syncopal episode w/ subsequent head trauma.  w/u at [facility] [place] was significant for SAH and was transferred to [facility] neuro ICU on 04/23/24.  On initial evaluation on arrival pt was c/o double vision but denied HA.  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  CTA h/n showed prominent vasculature in the left perimesencephalic cistern/left tentorium of which differential included reactive changes due to known hemorrhage vs underlying dural AV fistula.  NeuroIR evaluated and pt underwent cerebral angiography on 04/24 and no dural AV fistula was found.  Hospital course complicated by new AFlutter.  EP was consulted and planning for possible cardioversion but after cardioversion pt would need to be on at least 1 month of uninterrupted AC.  Per neruoICU pt can not start AC until 04/30.  Lovenox for VTE ppx cleared to be started tonight as per neuroICU.  Pt medically stable for transfer to medical service for continued management .      # Traumatic SAH and L-dorsal camila infarct   - Eye patch ordered for persistent double vision  - Repeat CTH w/ stable SAH   - CTA h/n w/ possible dural AV fistula   - s/p cerebral angiography on 04/24 w/ no evidence of dural AV fistula  - MRI brain w/ small acute infarct in the left dorsal camila and SAH in most prominent in the left perimesencephalic region  - TTE 4/2024 w/ LVEF 60-65%  - On ASA 81mg and cleared to start lovenox tonight for VTE ppx   - Can not start AC until 04/30 as per neuroICU  - c/w statin therapy   - Goal SBP <160  - Fall precautions   - c/w Neuro checks and VS q4h   - PT consulted and recommending home PT w/ assist   - Continue to monitor on telemetry   - Neurosurg recs noted     # Syncope likely 2/2 arrhythmia vs CVA   - Found to be in new Aflutter w/ intermittent bradycardia  - Concern for possible AV dysfunction/block   - Avoid AVN blockers   - No indication for PPM as pt is in Aflutter and pauses are <5 sec as per EP  - EP planning for possible DCCV vs ablation but would then need to be on uninterrupted AC for at least 30 days   - Earliest pt cleared to start AC would be 4/30/24  - Possible ILR prior to d/c as per EP   - TTE w/ LVEF 60-65% w/ no significant valvulopathy or evidence of WMA  - Monitor on telemetry     # Normocytic anemia / mild thrombocytopenia   H/H and plts remain stable   TSH, B12 and folate WNL  - Hemodynamically stable and no active bleeding   - Monitor CBC and transfuse as needed     # HTN  # Dyslipidemia  - PRN Hydralazine (SBP>160)  - statin and ezetimibe    # BPH  - Tamulosin      VTE ppx: LMWH

## 2024-04-27 NOTE — PROGRESS NOTE ADULT - SUBJECTIVE AND OBJECTIVE BOX
Subjective: No acute events documented overnight. Pt resting comfortably at time of assessment. Currently reports persistent double vision, no new complaints.       TELE: Atrial flutter with controlled ventriclar rates. Slow VR in the 40s overnight. No significant pauses appreciated.      MEDICATIONS  (STANDING):  atorvastatin 40 milliGRAM(s) Oral at bedtime  chlorhexidine 2% Cloths 1 Application(s) Topical daily  enoxaparin Injectable 40 milliGRAM(s) SubCutaneous every 24 hours  ezetimibe 10 milliGRAM(s) Oral daily  famotidine    Tablet 20 milliGRAM(s) Oral daily  polyethylene glycol 3350 17 Gram(s) Oral daily  senna 2 Tablet(s) Oral at bedtime  tamsulosin 0.4 milliGRAM(s) Oral at bedtime    MEDICATIONS  (PRN):  acetaminophen   IVPB .. 1000 milliGRAM(s) IV Intermittent every 6 hours PRN Temp greater or equal to 38C (100.4F), Mild Pain (1 - 3)  hydrALAZINE Injectable 10 milliGRAM(s) IV Push every 2 hours PRN SBP >160      Allergies    clarithromycin (Swelling)    Intolerances        Vital Signs Last 24 Hrs  T(C): 36.3 (27 Apr 2024 07:30), Max: 36.8 (26 Apr 2024 11:27)  T(F): 97.4 (27 Apr 2024 07:30), Max: 98.3 (27 Apr 2024 00:05)  HR: 67 (27 Apr 2024 07:30) (61 - 68)  BP: 142/69 (27 Apr 2024 07:30) (108/60 - 149/71)  BP(mean): 90 (26 Apr 2024 13:00) (81 - 90)  RR: 18 (27 Apr 2024 07:30) (18 - 23)  SpO2: 97% (27 Apr 2024 07:30) (94% - 100%)    Parameters below as of 27 Apr 2024 07:30  Patient On (Oxygen Delivery Method): room air        Physical Exam:  Constitutional: NAD  Chest wall: normal in appearance, nontender to palpation  Resp: effort normal, breath sounds clear to auscultation bilaterally  Cardiac: irregular rhythm. Regular rate.  Neuro: Alert and oriented x 3,  Skin: color normal, no rashes or injury  Psych: mood calm    LABS:                        10.3   6.85  )-----------( 147      ( 27 Apr 2024 04:35 )             31.4     04-27    138  |  105  |  19.5  ----------------------------<  110<H>  4.5   |  27.0  |  0.79    Ca    8.3<L>      27 Apr 2024 04:35  Phos  2.7     04-27  Mg     2.1     04-27        Urinalysis Basic - ( 27 Apr 2024 04:35 )    Color: x / Appearance: x / SG: x / pH: x  Gluc: 110 mg/dL / Ketone: x  / Bili: x / Urobili: x   Blood: x / Protein: x / Nitrite: x   Leuk Esterase: x / RBC: x / WBC x   Sq Epi: x / Non Sq Epi: x / Bacteria: x        RADIOLOGY & ADDITIONAL TESTS:    < from: TTE W or WO Ultrasound Enhancing Agent (04.24.24 @ 13:20) >  1. Technically difficult image quality.   2. Left ventricular cavity is normal in size. Left ventricular systolic function is normal with an ejection fraction visually estimated at 65 to 70 %.   3. Analysis of left ventricular diastolic function and filling pressure is made challenging by the presence of atrial fibrillation.   4. Mildly enlarged right ventricular cavity size and normal systolic function.   5. The left atrium is normal in size.   6. The right atrium is normal in size.   7. Fibrocalcific aortic valve sclerosis without stenosis.   8. Mild mitral regurgitation.   9. Estimated pulmonary artery systolic pressure is 33 mmHg, normal pulmonary artery pressure.  10. Trace pericardial effusion.        Assessment:     84 year old male with GERD, HTN, HLD, CAD (PCI with JIMMIE to LAD 12/22), newly diagnosed a flutter, who initially presented to [facility] [place] after syncopal episode w/ subsequent head trauma found to have a SAH and was transferred to [facility] neuro ICU on 04/23/24. Pt noted to be in AFL with a 4.6 second pause on telemetry, for which the EP service was consulted.    Pt remains in AFL with controlled ventricular rates, with episodes of bradycardia during sleeping hours (40s). No significant pauses or high grade AVB appreciated overnight. Anticoagulation remains on hold due to SAH. Pt currently c/o persistent double vision, no other complaints.          Recommendations  - continue telemetry monitoring  - Remain off AVN blocking agents as HRs remain controlled  - Start AC when cleared by Neuro Sx team   - Will wait to ensure tolerating anitcoagulation before DCCV (or eventual flutter ablation). Likely to be arranged on outpatient basis.   - Pt is a good candidate for LAAO as alternative to long-term anticoagulation. Will also discuss at time of outpatient follow up  - No indication for PPM at this time as pt is in typical Atrial flutter w/o significant pauses  - EP to follow    Discussed with Dr. Cueto, further recommendations to follow   Subjective: No acute events documented overnight. Pt resting comfortably at time of assessment. Currently reports persistent double vision, no new complaints.     TELE: Atrial flutter with controlled ventriclar rates. Slow VR in the 40s overnight. No significant pauses appreciated.    MEDICATIONS  (STANDING):  atorvastatin 40 milliGRAM(s) Oral at bedtime  chlorhexidine 2% Cloths 1 Application(s) Topical daily  enoxaparin Injectable 40 milliGRAM(s) SubCutaneous every 24 hours  ezetimibe 10 milliGRAM(s) Oral daily  famotidine    Tablet 20 milliGRAM(s) Oral daily  polyethylene glycol 3350 17 Gram(s) Oral daily  senna 2 Tablet(s) Oral at bedtime  tamsulosin 0.4 milliGRAM(s) Oral at bedtime    MEDICATIONS  (PRN):  acetaminophen   IVPB .. 1000 milliGRAM(s) IV Intermittent every 6 hours PRN Temp greater or equal to 38C (100.4F), Mild Pain (1 - 3)  hydrALAZINE Injectable 10 milliGRAM(s) IV Push every 2 hours PRN SBP >160      Allergies    clarithromycin (Swelling)    Intolerances        Vital Signs Last 24 Hrs  T(C): 36.3 (27 Apr 2024 07:30), Max: 36.8 (26 Apr 2024 11:27)  T(F): 97.4 (27 Apr 2024 07:30), Max: 98.3 (27 Apr 2024 00:05)  HR: 67 (27 Apr 2024 07:30) (61 - 68)  BP: 142/69 (27 Apr 2024 07:30) (108/60 - 149/71)  BP(mean): 90 (26 Apr 2024 13:00) (81 - 90)  RR: 18 (27 Apr 2024 07:30) (18 - 23)  SpO2: 97% (27 Apr 2024 07:30) (94% - 100%)    Parameters below as of 27 Apr 2024 07:30  Patient On (Oxygen Delivery Method): room air        Physical Exam:  Constitutional: NAD  Chest wall: normal in appearance, nontender to palpation  Resp: effort normal, breath sounds clear to auscultation bilaterally  Cardiac: irregular rhythm. Regular rate.  Neuro: Alert and oriented x 3,  Skin: color normal, no rashes or injury  Psych: mood calm    LABS:                        10.3   6.85  )-----------( 147      ( 27 Apr 2024 04:35 )             31.4     04-27    138  |  105  |  19.5  ----------------------------<  110<H>  4.5   |  27.0  |  0.79    Ca    8.3<L>      27 Apr 2024 04:35  Phos  2.7     04-27  Mg     2.1     04-27        Urinalysis Basic - ( 27 Apr 2024 04:35 )    Color: x / Appearance: x / SG: x / pH: x  Gluc: 110 mg/dL / Ketone: x  / Bili: x / Urobili: x   Blood: x / Protein: x / Nitrite: x   Leuk Esterase: x / RBC: x / WBC x   Sq Epi: x / Non Sq Epi: x / Bacteria: x        RADIOLOGY & ADDITIONAL TESTS:    < from: TTE W or WO Ultrasound Enhancing Agent (04.24.24 @ 13:20) >  1. Technically difficult image quality.   2. Left ventricular cavity is normal in size. Left ventricular systolic function is normal with an ejection fraction visually estimated at 65 to 70 %.   3. Analysis of left ventricular diastolic function and filling pressure is made challenging by the presence of atrial fibrillation.   4. Mildly enlarged right ventricular cavity size and normal systolic function.   5. The left atrium is normal in size.   6. The right atrium is normal in size.   7. Fibrocalcific aortic valve sclerosis without stenosis.   8. Mild mitral regurgitation.   9. Estimated pulmonary artery systolic pressure is 33 mmHg, normal pulmonary artery pressure.  10. Trace pericardial effusion.        Assessment:     84 year old male with GERD, HTN, HLD, CAD (PCI with JIMMIE to LAD 12/22), newly diagnosed a flutter, who initially presented to [facility] [place] after syncopal episode w/ subsequent head trauma found to have a SAH and was transferred to [facility] neuro ICU on 04/23/24. Pt noted to be in AFL with a 4.6 second pause on telemetry, for which the EP service was consulted.    Pt remains in AFL with controlled ventricular rates, with episodes of bradycardia during sleeping hours (40s). No significant pauses or high grade AVB appreciated overnight. Anticoagulation remains on hold due to SAH. Pt currently c/o persistent double vision, no other complaints.    Recommendations  - continue telemetry monitoring  - Remain off AVN blocking agents as HRs remain controlled  - Start AC when cleared by Neuro Sx team   - Will wait to ensure tolerating anitcoagulation before DCCV (or eventual flutter ablation). Likely to be arranged on outpatient basis.   - Pt is a good candidate for LAAO as alternative to long-term anticoagulation. Will also discuss at time of outpatient follow up  - No indication for PPM at this time as pt is in typical Atrial flutter w/o significant pauses  - EP to follow    Discussed with Dr. Cueto, further recommendations to follow

## 2024-04-27 NOTE — PROGRESS NOTE ADULT - SUBJECTIVE AND OBJECTIVE BOX
HOSPITALIST PROGRESS NOTE    KALINA CHRISTENSEN  [id number]  84yMale    Patient is a 84y old  Male who presents with a chief complaint of Fall (27 Apr 2024 10:02)      SUBJECTIVE:   Chart reviewed since last visit.  Patient seen and examined at bedside.      OBJECTIVE:  Vital Signs Last 24 Hrs  T(C): 36.3 (27 Apr 2024 07:30), Max: 36.8 (26 Apr 2024 11:27)  T(F): 97.4 (27 Apr 2024 07:30), Max: 98.3 (27 Apr 2024 00:05)  HR: 67 (27 Apr 2024 07:30) (61 - 68)  BP: 142/69 (27 Apr 2024 07:30) (108/60 - 149/71)  BP(mean): 90 (26 Apr 2024 13:00) (81 - 90)  RR: 18 (27 Apr 2024 07:30) (18 - 23)  SpO2: 97% (27 Apr 2024 07:30) (94% - 100%)    Parameters below as of 27 Apr 2024 07:30  Patient On (Oxygen Delivery Method): room air        PHYSICAL EXAMINATION  General:   HEENT:    NECK:    CVS:   RESP:    GI:    :   MSK:    CNS:    INTEG:    PSYCH:      MONITOR:  CAPILLARY BLOOD GLUCOSE            I&O's Summary    26 Apr 2024 07:01  -  27 Apr 2024 07:00  --------------------------------------------------------  IN: 240 mL / OUT: 300 mL / NET: -60 mL                            10.3   6.85  )-----------( 147      ( 27 Apr 2024 04:35 )             31.4       04-27    138  |  105  |  19.5  ----------------------------<  110<H>  4.5   |  27.0  |  0.79    Ca    8.3<L>      27 Apr 2024 04:35  Phos  2.7     04-27  Mg     2.1     04-27        A1C with Estimated Average Glucose Result: 5.3 % (04.24.24 @ 03:56)   LDL Cholesterol Calculated: 40 mg/dL (04.26.24 @ 03:36)   Urinalysis Basic - ( 27 Apr 2024 04:35 )    Color: x / Appearance: x / SG: x / pH: x  Gluc: 110 mg/dL / Ketone: x  / Bili: x / Urobili: x   Blood: x / Protein: x / Nitrite: x   Leuk Esterase: x / RBC: x / WBC x   Sq Epi: x / Non Sq Epi: x / Bacteria: x        Culture:    TTE:  < from: TTE W or WO Ultrasound Enhancing Agent (04.24.24 @ 13:20) >     CONCLUSIONS:      1. Technically difficult image quality.   2. Left ventricular cavity is normal in size. Left ventricular systolic function is normal with an ejection fraction visually estimated at 65 to 70 %.   3. Analysis of left ventricular diastolic function and filling pressure is made challenging by the presence of atrial fibrillation.   4. Mildly enlarged right ventricular cavity size and normal systolic function.   5. The left atrium is normal in size.   6. The right atrium is normal in size.   7. Fibrocalcific aortic valve sclerosis without stenosis.   8. Mild mitral regurgitation.   9. Estimated pulmonary artery systolic pressure is 33 mmHg, normal pulmonary artery pressure.  10. Trace pericardial effusion.    < end of copied text >        RADIOLOGY    < from: CT Head No Cont (04.23.24 @ 17:51) >  IMPRESSION:  CT head:  -Stable left perimesencephalic cistern and left superior frontal   subarachnoid hemorrhage.  -No new or worsening intracranial hemorrhage.    CT angiogram head:  -Prominent vasculature in the left perimesencephalic cistern/left   tentorium. Differential includes reactive changes due to known hemorrhage   versus underlying dural AV fistula. Recommend neurointerventional   consultation to consider conventional angiography.    CT angiogram neck:  -No vaso-occlusive disease.    < end of copied text >        < from: CT Head No Cont (04.24.24 @ 11:52) >  IMPRESSION: No change since 4/23/2024. Subarachnoid hemorrhage left   perimesencephalic and quadrigeminal plate cisterns and left frontal   cortex.    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    Subarachnoid hemorrhage again seen most prominent in the left   perimesencephalic region. Mild hypervascularity in this region is again   seen. Please see subsequent conventional angiography report.      < end of copied text >      MEDICATIONS  (STANDING):  atorvastatin 40 milliGRAM(s) Oral at bedtime  chlorhexidine 2% Cloths 1 Application(s) Topical daily  enoxaparin Injectable 40 milliGRAM(s) SubCutaneous every 24 hours  ezetimibe 10 milliGRAM(s) Oral daily  famotidine    Tablet 20 milliGRAM(s) Oral daily  polyethylene glycol 3350 17 Gram(s) Oral daily  senna 2 Tablet(s) Oral at bedtime  tamsulosin 0.4 milliGRAM(s) Oral at bedtime      MEDICATIONS  (PRN):  acetaminophen   IVPB .. 1000 milliGRAM(s) IV Intermittent every 6 hours PRN Temp greater or equal to 38C (100.4F), Mild Pain (1 - 3)  hydrALAZINE Injectable 10 milliGRAM(s) IV Push every 2 hours PRN SBP >160     HOSPITALIST PROGRESS NOTE    KALINA CHRISTENSEN  [id number]  84yMale    Patient is a 84y old  Male who presents with a chief complaint of Fall (27 Apr 2024 10:02)      SUBJECTIVE:   Chart reviewed since last visit.  Patient seen and examined at bedside for SAH, CVA.  Still with double vision  Denies any headache, dizziness, nausea, vomiting, pain or dyspnea      OBJECTIVE:  Vital Signs Last 24 Hrs  T(C): 36.3 (27 Apr 2024 07:30), Max: 36.8 (26 Apr 2024 11:27)  T(F): 97.4 (27 Apr 2024 07:30), Max: 98.3 (27 Apr 2024 00:05)  HR: 67 (27 Apr 2024 07:30) (61 - 68)  BP: 142/69 (27 Apr 2024 07:30) (108/60 - 149/71)  BP(mean): 90 (26 Apr 2024 13:00) (81 - 90)  RR: 18 (27 Apr 2024 07:30) (18 - 23)  SpO2: 97% (27 Apr 2024 07:30) (94% - 100%)    Parameters below as of 27 Apr 2024 07:30  Patient On (Oxygen Delivery Method): room air        PHYSICAL EXAMINATION  General: Elderly male lying in bed, comfortable  HEENT:  No facial asymmetry, EOMI  NECK:  Supple  CVS: regular rate and rhythm S1 S2  RESP:  Fair air entry bilaterally  GI:  Soft nondistended nontender BS+  : No suprapubic tenderness  MSK:  FROM  CNS:  Awake, alert. No facial asymmetry, extraocular movements intact, fluent speech, strength intact grossly with normal finger nose  INTEG:  warm dry skin  PSYCH:  Fair mood    MONITOR:  CAPILLARY BLOOD GLUCOSE            I&O's Summary    26 Apr 2024 07:01  -  27 Apr 2024 07:00  --------------------------------------------------------  IN: 240 mL / OUT: 300 mL / NET: -60 mL                            10.3   6.85  )-----------( 147      ( 27 Apr 2024 04:35 )             31.4       04-27    138  |  105  |  19.5  ----------------------------<  110<H>  4.5   |  27.0  |  0.79    Ca    8.3<L>      27 Apr 2024 04:35  Phos  2.7     04-27  Mg     2.1     04-27        A1C with Estimated Average Glucose Result: 5.3 % (04.24.24 @ 03:56)   LDL Cholesterol Calculated: 40 mg/dL (04.26.24 @ 03:36)   Urinalysis Basic - ( 27 Apr 2024 04:35 )    Color: x / Appearance: x / SG: x / pH: x  Gluc: 110 mg/dL / Ketone: x  / Bili: x / Urobili: x   Blood: x / Protein: x / Nitrite: x   Leuk Esterase: x / RBC: x / WBC x   Sq Epi: x / Non Sq Epi: x / Bacteria: x        Culture:    TTE:  < from: TTE W or WO Ultrasound Enhancing Agent (04.24.24 @ 13:20) >     CONCLUSIONS:      1. Technically difficult image quality.   2. Left ventricular cavity is normal in size. Left ventricular systolic function is normal with an ejection fraction visually estimated at 65 to 70 %.   3. Analysis of left ventricular diastolic function and filling pressure is made challenging by the presence of atrial fibrillation.   4. Mildly enlarged right ventricular cavity size and normal systolic function.   5. The left atrium is normal in size.   6. The right atrium is normal in size.   7. Fibrocalcific aortic valve sclerosis without stenosis.   8. Mild mitral regurgitation.   9. Estimated pulmonary artery systolic pressure is 33 mmHg, normal pulmonary artery pressure.  10. Trace pericardial effusion.    < end of copied text >        RADIOLOGY    < from: CT Head No Cont (04.23.24 @ 17:51) >  IMPRESSION:  CT head:  -Stable left perimesencephalic cistern and left superior frontal   subarachnoid hemorrhage.  -No new or worsening intracranial hemorrhage.    CT angiogram head:  -Prominent vasculature in the left perimesencephalic cistern/left   tentorium. Differential includes reactive changes due to known hemorrhage   versus underlying dural AV fistula. Recommend neurointerventional   consultation to consider conventional angiography.    CT angiogram neck:  -No vaso-occlusive disease.    < end of copied text >        < from: CT Head No Cont (04.24.24 @ 11:52) >  IMPRESSION: No change since 4/23/2024. Subarachnoid hemorrhage left   perimesencephalic and quadrigeminal plate cisterns and left frontal   cortex.    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    Subarachnoid hemorrhage again seen most prominent in the left   perimesencephalic region. Mild hypervascularity in this region is again   seen. Please see subsequent conventional angiography report.      < end of copied text >      MEDICATIONS  (STANDING):  atorvastatin 40 milliGRAM(s) Oral at bedtime  chlorhexidine 2% Cloths 1 Application(s) Topical daily  enoxaparin Injectable 40 milliGRAM(s) SubCutaneous every 24 hours  ezetimibe 10 milliGRAM(s) Oral daily  famotidine    Tablet 20 milliGRAM(s) Oral daily  polyethylene glycol 3350 17 Gram(s) Oral daily  senna 2 Tablet(s) Oral at bedtime  tamsulosin 0.4 milliGRAM(s) Oral at bedtime      MEDICATIONS  (PRN):  acetaminophen   IVPB .. 1000 milliGRAM(s) IV Intermittent every 6 hours PRN Temp greater or equal to 38C (100.4F), Mild Pain (1 - 3)  hydrALAZINE Injectable 10 milliGRAM(s) IV Push every 2 hours PRN SBP >160

## 2024-04-28 PROCEDURE — 99233 SBSQ HOSP IP/OBS HIGH 50: CPT

## 2024-04-28 RX ORDER — ACETAMINOPHEN 500 MG
650 TABLET ORAL EVERY 6 HOURS
Refills: 0 | Status: DISCONTINUED | OUTPATIENT
Start: 2024-04-28 | End: 2024-04-30

## 2024-04-28 RX ADMIN — CHLORHEXIDINE GLUCONATE 1 APPLICATION(S): 213 SOLUTION TOPICAL at 11:45

## 2024-04-28 RX ADMIN — TAMSULOSIN HYDROCHLORIDE 0.4 MILLIGRAM(S): 0.4 CAPSULE ORAL at 21:45

## 2024-04-28 RX ADMIN — FAMOTIDINE 20 MILLIGRAM(S): 10 INJECTION INTRAVENOUS at 11:42

## 2024-04-28 RX ADMIN — ATORVASTATIN CALCIUM 40 MILLIGRAM(S): 80 TABLET, FILM COATED ORAL at 21:45

## 2024-04-28 RX ADMIN — EZETIMIBE 10 MILLIGRAM(S): 10 TABLET ORAL at 11:43

## 2024-04-28 RX ADMIN — ENOXAPARIN SODIUM 40 MILLIGRAM(S): 100 INJECTION SUBCUTANEOUS at 21:44

## 2024-04-28 RX ADMIN — Medication 650 MILLIGRAM(S): at 17:56

## 2024-04-28 RX ADMIN — Medication 650 MILLIGRAM(S): at 18:40

## 2024-04-28 NOTE — PROGRESS NOTE ADULT - SUBJECTIVE AND OBJECTIVE BOX
HOSPITALIST PROGRESS NOTE    KALINA CHRISTENSEN  [id number]  84yMale    Patient is a 84y old  Male who presents with a chief complaint of Fall (27 Apr 2024 10:49)      SUBJECTIVE:   Chart reviewed since last visit.  Patient seen and examined at bedside for CVA, SDH, AF  Continues to have diploplia  Denies any headache, dizziness      OBJECTIVE:  Vital Signs Last 24 Hrs  T(C): 36.8 (28 Apr 2024 11:44), Max: 37.1 (28 Apr 2024 01:21)  T(F): 98.2 (28 Apr 2024 11:44), Max: 98.7 (28 Apr 2024 01:21)  HR: 76 (28 Apr 2024 11:44) (67 - 95)  BP: 120/67 (28 Apr 2024 11:44) (120/67 - 145/73)   RR: 18 (28 Apr 2024 11:44) (18 - 18)  SpO2: 93% (28 Apr 2024 11:44) (92% - 100%)    Parameters below as of 28 Apr 2024 11:44  Patient On (Oxygen Delivery Method): room air          PHYSICAL EXAMINATION  General: Elderly male lying in bed, comfortable  HEENT:  No facial asymmetry, EOMI  NECK:  Supple  CVS: regular rate and rhythm S1 S2  RESP:  Fair air entry bilaterally  GI:  Soft nondistended nontender BS+  : No suprapubic tenderness  MSK:  FROM  CNS:  Awake, alert. No facial asymmetry, extraocular movements intact, fluent speech, strength intact grossly with normal finger nose  INTEG:  warm dry skin  PSYCH:  Fair mood    MONITOR:  CAPILLARY BLOOD GLUCOSE            I&O's Summary    27 Apr 2024 07:01  -  28 Apr 2024 07:00  --------------------------------------------------------  IN: 240 mL / OUT: 350 mL / NET: -110 mL                            10.3   6.85  )-----------( 147      ( 27 Apr 2024 04:35 )             31.4       04-27    138  |  105  |  19.5  ----------------------------<  110<H>  4.5   |  27.0  |  0.79    Ca    8.3<L>      27 Apr 2024 04:35  Phos  2.7     04-27  Mg     2.1     04-27          Urinalysis Basic - ( 27 Apr 2024 04:35 )    Color: x / Appearance: x / SG: x / pH: x  Gluc: 110 mg/dL / Ketone: x  / Bili: x / Urobili: x   Blood: x / Protein: x / Nitrite: x   Leuk Esterase: x / RBC: x / WBC x   Sq Epi: x / Non Sq Epi: x / Bacteria: x        Culture:    TTE:    RADIOLOGY        MEDICATIONS  (STANDING):  atorvastatin 40 milliGRAM(s) Oral at bedtime  chlorhexidine 2% Cloths 1 Application(s) Topical daily  enoxaparin Injectable 40 milliGRAM(s) SubCutaneous every 24 hours  ezetimibe 10 milliGRAM(s) Oral daily  famotidine    Tablet 20 milliGRAM(s) Oral daily  polyethylene glycol 3350 17 Gram(s) Oral daily  senna 2 Tablet(s) Oral at bedtime  tamsulosin 0.4 milliGRAM(s) Oral at bedtime      MEDICATIONS  (PRN):  acetaminophen   IVPB .. 1000 milliGRAM(s) IV Intermittent every 6 hours PRN Temp greater or equal to 38C (100.4F), Mild Pain (1 - 3)  hydrALAZINE Injectable 10 milliGRAM(s) IV Push every 2 hours PRN SBP >160

## 2024-04-28 NOTE — INPATIENT CERTIFICATION FOR MEDICARE PATIENTS - CURRENT MEDICAL NEEDS AND CARE PLANS
fEMALE ANNUAL EXAM note      History    Minerva Carcamo is a 52 year old year old female who presents for an annual exam.          medical history      Past Medical History:   Diagnosis Date   • Anxiety    • Arthritis    • Depression    • Eczema    • Essential (primary) hypertension    • Fibromyalgia 2/15/2013   • Hemorrhoids    • Liver disease     fatty liver   • Neuralgia 8/21/2013   • Osteoarthrosis, unspecified whether generalized or localized, hand 1/20/2015   • Osteopenia 09/18/2007   • Other chronic pain    • Pain in joint, multiple sites 8/21/2013   • Unspecified sinusitis (chronic)        SURGICAL history  Past Surgical History:   Procedure Laterality Date   • Bunionectomy Right    • Colonoscopy  12/04/2009   • Dexa bone density axial skeleton  09/18/2007   • Esophagogastroduodenoscopy transoral flex w/bx single or mult  12/04/2009   • Hysterectomy     • Service to gastroenterology     • Total abdominal hysterectomy  06/01/2001    Bilateral oophorectomy    • Tubal ligation         social history    Social History     Social History   • Marital status:      Spouse name: N/A   • Number of children: N/A   • Years of education: N/A     Social History Main Topics   • Smoking status: Former Smoker     Packs/day: 0.50     Quit date: 5/2/2018   • Smokeless tobacco: Never Used   • Alcohol use 16.8 - 21.6 oz/week     28 - 36 Standard drinks or equivalent per week      Comment: beer   • Drug use: No   • Sexual activity: Not Asked     Other Topics Concern   • None     Social History Narrative   • None       family history    Family History   Problem Relation Age of Onset   • Cancer Mother 49        breast cancer   • Cancer Brother         AML   • Hypertension Father    • Arthritis Father    • High blood pressure Father    • Cancer Maternal Uncle 50        prostate cancer   • Cancer Paternal Grandmother 60        breast cancer   • Cancer, Breast Maternal Grandmother 49       mEDICATIONS   Current Outpatient  Prescriptions   Medication Sig Dispense Refill   • sertraline (ZOLOFT) 100 MG tablet Take 0.5 tablets by mouth daily. 45 tablet 1   • celecoxib (CELEBREX) 200 MG capsule Take 1 capsule by mouth daily. 90 capsule 1   • triamterene-hydroCHLOROthiazide (MAXZIDE-25) 37.5-25 MG per tablet Take 1 tablet by mouth daily. 90 tablet 1   • ketoconazole (NIZORAL) 2 % cream Apply 1 application topically as needed.     • diclofenac (VOLTAREN) 1 % gel Apply to the bilateral hands up to 4x daily as needed. 300 g 1   • pimecrolimus (ELIDEL) 1 % cream Apply  topically 2 times daily.     • triamcinolone (ARISTOCORT) 0.1 % ointment aa bid 30 g 0   • desonide (DESOWEN) 0.05 % cream Apply thin area if irritation on face daily. 30 g 0   • fluconazole (DIFLUCAN) 150 MG tablet Take 1 tablet by mouth once for 1 dose. 1 tablet 0     No current facility-administered medications for this visit.        aLLERGIES   ALLERGIES:   Allergen Reactions   • Latex RASH   • Lidocaine RASH       Review of systems        Constitutional:  Denies fevers, chills, weakness, fatigue, loss of appetite, abnormal weight gain or abnormal weight loss.    Eyes:  Denies blindness, blurred vision, double vision, pain, itching or burning.    HENT:  Denies facial pain, ear pain, hearing loss, tinnitus, nasal congestion, rhinorrhea, epistaxis, sinus pain, mouth lesions or sore throat.    Respiratory:  Denies SOB, cough, sputum production, hemoptysis or wheezing.    Cardiovascular:  Denies chest pains, palpitations, tachycardia, edema, cyanosis or vertigo.    Gastrointestinal:  Has been having intermittent epigastric pain dull in nature radiating to the right quadrant area, the pain usually starts in our after she eats and it is associated with intermittent diarrhea, no nausea or vomiting..  Musculoskeletal:  Denies back pain, does have intermittent fingers knees and ankles joint pain, intermittent mild these joint swelling and tenderness, no muscle pains or  spasms.Neurologic:  Denies numbness, tingling, other sensory changes, sudden weakness in arms or legs. Denies confusion, headache, dizziness, memory loss or tremors.    Genitourinary:  Denies urinary frequency, nocturia, urgency, incontinence, dysuria or hematuria.    Hematologic/Lymph:  Denies easy bruising or bleeding, swollen lymph glands.    Endocrine:  Denies heat or cold intolerance, polydipsia or polyduria.  Denies changes in hair or skin texture.    Integument:  Denies new rashes or lesions, Psychiatric:  Denies anxiety, controlled depression, no hallucinations, irritability or sleeping problems.         Physical Exam    Vital Signs:  Blood pressure 110/76, pulse 60, resp. rate 12, height 5' 3\" (1.6 m), weight 78.3 kg, last menstrual period 06/01/2001.  General:  Well developed, well nourished. In no apparent distress.    Eyes:  PERRL, EOMI. Conjunctivae pink. Sclerae anicteric.    HENT:  Normocephalic, atraumatic. Bilateral external ears are normal. Mucosal membranes moist. External nose is normal. Oropharynx is clear.    Neck:  Supple. Nontender. Normal range of motion. No masses. No thyromegaly.  Trachea midline.  Respiratory:  Normal respiratory effort. No chest wall tenderness. Lungs clear to auscultation bilaterally. Symmetrical chest expansion.    Cardiovascular:  Regular rate and rhythm. No murmurs, rubs, or gallops. Normal S1 and S2. No S3 or S4. No JVD. No carotid bruits. Good dorsalis pedis pulses bilaterally. No peripheral edema.  Gastrointestinal:  Soft. Has right upper quadrant tenderness and epigastric tenderness. Nondistended. Normal bowel sounds. No pulsatile or other abdominal masses. No hepatosplenomegaly or splenomegaly.    Breasts:  Symmetric. No masses. No nipple discharge.    :  Pelvic Exam:   Normal external genitalia with Normal vulva. Dry vagina with no polyps , there is a small white lesion on the right side. Status post supracervical hysterectomy, . no adnexal mass or tenderness  bilaterally,no bladder tenderness.       Musculoskeletal:  No clubbing or cyanosis. Full range of motion in all 4 extremities proximal and distal. No joint swelling or tenderness in right and left shoulders, elbows, wrists and fingers. No joint swelling or tenderness in left and right knees, ankles and toes.    Neurologic:  Alert and oriented x 3. Gait is normal. Normal sensory function. No motor deficits in all 4 extremities. Cranial nerves 3-XII intact. Symmetrical bilateral knee DTR’s.   Integumentary:  Warm. Dry. Pink. No rashes or lesions. No wounds.    Lymphatic:  No lymphadenopathy in submental, submandibular or cervical chain. No supraclavicular or infraclavicular lymphadenopathy. No axillary or inguinal/groin lymphadenopathy.    Psychiatric: Cooperative. Appropriate mood and affect. Normal judgment.      Results    Pertinent labs and imaging studies reviewed.      Assessment & Plan    Annual physical exam was done.  Lipids at the goal.  Mammogram normal this much., Needs breast MRI in September. She is BRCA Positive, she continues decline prophylactic bilateral mastectomy, she is agreeable to undergo mammogram and MRI annually.   Discussed healthy lifestyle.  Congratulated on smoking cessation.  White lesion vaginal area, possibly cyst infection, Return Monday to repeat pelvic exam: Fluconazole ×1 dose was prescribed.  Right upper quadrant pain and epigastric pain with change in bowel habits, abdominal ultrasound ordered and refer to GI.     Possible Acute Rehab

## 2024-04-28 NOTE — PROGRESS NOTE ADULT - ASSESSMENT
83 y/o M w/ PMH of gerd, HTN, HLD, CAD s/p PCI (not on DAPT despite it being prescribed to him) [place] [facility] [place].  [place] [facility] [place]H and was transferred to [facility] neuro ICU on 04/23/24.  On initial evaluation on arrival pt was c/o double vision but denied HA.  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  CTA h/n showed prominent vasculature in the left perimesencephalic cistern/left tentorium of which differential included reactive changes due to known hemorrhage vs underlying dural AV fistula.  NeuroIR evaluated and pt underwent cerebral angiography on 04/24 and no dural AV fistula was found.  Hospital course complicated by new AFlutter.  EP was consulted and planning for possible cardioversion but after cardioversion pt would need to be on at least 1 month of uninterrupted AC.  Per neruoICU pt can not start AC until 04/30.  Lovenox for VTE ppx cleared to be started tonight as per neuroICU.  Pt medically stable for transfer to medical service for continued management .      # Traumatic SAH and L-dorsal camila infarct   - Eye patch ordered for persistent double vision  - Repeat CTH w/ stable SAH   - CTA h/n w/ possible dural AV fistula   - s/p cerebral angiography on 04/24 w/ no evidence of dural AV fistula  - MRI brain w/ small acute infarct in the left dorsal camila and SAH in most prominent in the left perimesencephalic region  - TTE 4/2024 w/ LVEF 60-65%  - On ASA 81mg and cleared to start lovenox tonight for VTE ppx   - Can not start AC until 04/30 as per neuroICU  - c/w statin therapy   - Goal SBP <160  - Fall precautions   - c/w Neuro checks and VS q4h   - PT consulted and recommending home PT w/ assist   - Continue to monitor on telemetry   - Neurosurg recs noted     # Syncope likely 2/2 arrhythmia vs CVA   - Found to be in new Aflutter w/ intermittent bradycardia  - Concern for possible AV dysfunction/block   - Avoid AVN blockers   - No indication for PPM as pt is in Aflutter and pauses are <5 sec as per EP  - EP planning for possible DCCV vs ablation but would then need to be on uninterrupted AC for at least 30 days   - Earliest pt cleared to start AC would be 4/30/24  - Possible ILR prior to d/c as per EP   - TTE w/ LVEF 60-65% w/ no significant valvulopathy or evidence of WMA  - Monitor on telemetry     # Normocytic anemia / mild thrombocytopenia   H/H and plts remain stable   TSH, B12 and folate WNL  - Hemodynamically stable and no active bleeding   - Monitor CBC and transfuse as needed     # HTN  # Dyslipidemia  - PRN Hydralazine (SBP>160)  - statin and ezetimibe    # BPH  - Tamulosin      VTE ppx: LMWH

## 2024-04-29 LAB
ANION GAP SERPL CALC-SCNC: 9 MMOL/L — SIGNIFICANT CHANGE UP (ref 5–17)
BUN SERPL-MCNC: 22.1 MG/DL — HIGH (ref 8–20)
CALCIUM SERPL-MCNC: 8.1 MG/DL — LOW (ref 8.4–10.5)
CHLORIDE SERPL-SCNC: 101 MMOL/L — SIGNIFICANT CHANGE UP (ref 96–108)
CO2 SERPL-SCNC: 25 MMOL/L — SIGNIFICANT CHANGE UP (ref 22–29)
CREAT SERPL-MCNC: 0.7 MG/DL — SIGNIFICANT CHANGE UP (ref 0.5–1.3)
EGFR: 91 ML/MIN/1.73M2 — SIGNIFICANT CHANGE UP
GLUCOSE SERPL-MCNC: 108 MG/DL — HIGH (ref 70–99)
HCT VFR BLD CALC: 31.2 % — LOW (ref 39–50)
HGB BLD-MCNC: 10.4 G/DL — LOW (ref 13–17)
MCHC RBC-ENTMCNC: 28.4 PG — SIGNIFICANT CHANGE UP (ref 27–34)
MCHC RBC-ENTMCNC: 33.3 GM/DL — SIGNIFICANT CHANGE UP (ref 32–36)
MCV RBC AUTO: 85.2 FL — SIGNIFICANT CHANGE UP (ref 80–100)
PLATELET # BLD AUTO: 162 K/UL — SIGNIFICANT CHANGE UP (ref 150–400)
POTASSIUM SERPL-MCNC: 4 MMOL/L — SIGNIFICANT CHANGE UP (ref 3.5–5.3)
POTASSIUM SERPL-SCNC: 4 MMOL/L — SIGNIFICANT CHANGE UP (ref 3.5–5.3)
RBC # BLD: 3.66 M/UL — LOW (ref 4.2–5.8)
RBC # FLD: 13.7 % — SIGNIFICANT CHANGE UP (ref 10.3–14.5)
SODIUM SERPL-SCNC: 135 MMOL/L — SIGNIFICANT CHANGE UP (ref 135–145)
WBC # BLD: 7.27 K/UL — SIGNIFICANT CHANGE UP (ref 3.8–10.5)
WBC # FLD AUTO: 7.27 K/UL — SIGNIFICANT CHANGE UP (ref 3.8–10.5)

## 2024-04-29 PROCEDURE — 99233 SBSQ HOSP IP/OBS HIGH 50: CPT

## 2024-04-29 PROCEDURE — 70450 CT HEAD/BRAIN W/O DYE: CPT | Mod: 26

## 2024-04-29 RX ADMIN — ATORVASTATIN CALCIUM 40 MILLIGRAM(S): 80 TABLET, FILM COATED ORAL at 21:17

## 2024-04-29 RX ADMIN — Medication 650 MILLIGRAM(S): at 12:40

## 2024-04-29 RX ADMIN — FAMOTIDINE 20 MILLIGRAM(S): 10 INJECTION INTRAVENOUS at 12:41

## 2024-04-29 RX ADMIN — POLYETHYLENE GLYCOL 3350 17 GRAM(S): 17 POWDER, FOR SOLUTION ORAL at 12:40

## 2024-04-29 RX ADMIN — SENNA PLUS 2 TABLET(S): 8.6 TABLET ORAL at 21:17

## 2024-04-29 RX ADMIN — EZETIMIBE 10 MILLIGRAM(S): 10 TABLET ORAL at 12:40

## 2024-04-29 RX ADMIN — Medication 650 MILLIGRAM(S): at 12:43

## 2024-04-29 RX ADMIN — ENOXAPARIN SODIUM 40 MILLIGRAM(S): 100 INJECTION SUBCUTANEOUS at 17:00

## 2024-04-29 RX ADMIN — CHLORHEXIDINE GLUCONATE 1 APPLICATION(S): 213 SOLUTION TOPICAL at 12:40

## 2024-04-29 RX ADMIN — TAMSULOSIN HYDROCHLORIDE 0.4 MILLIGRAM(S): 0.4 CAPSULE ORAL at 21:18

## 2024-04-29 NOTE — DIETITIAN INITIAL EVALUATION ADULT - PERTINENT LABORATORY DATA
04-29 Na135 mmol/L Glu 108 mg/dL<H> K+ 4.0 mmol/L Cr  0.70 mg/dL BUN 22.1 mg/dL<H>     A1C with Estimated Average Glucose Result: 5.3 % (04-24-24 @ 03:56)

## 2024-04-29 NOTE — DIETITIAN INITIAL EVALUATION ADULT - NS FNS DIET ORDER
Diet, Regular:   Supplement Feeding Modality:  Oral  Ensure Enlive Cans or Servings Per Day:  1       Frequency:  Three Times a day (04-26-24 @ 18:15)

## 2024-04-29 NOTE — PROGRESS NOTE ADULT - ASSESSMENT
85 y/o M w/ PMH of gerd, HTN, HLD, CAD s/p PCI (not on DAPT despite it being prescribed to him) [place] [facility] [place]/ subsequent head trauma.  [place] [facility] [place]H and was transferred to [facility] neuro ICU on 04/23/24.  On initial evaluation on arrival pt was c/o double vision but denied HA.  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  CTA h/n showed prominent vasculature in the left perimesencephalic cistern/left tentorium of which differential included reactive changes due to known hemorrhage vs underlying dural AV fistula.  NeuroIR evaluated and pt underwent cerebral angiography on 04/24 and no dural AV fistula was found.  Hospital course complicated by new AFlutter.  EP was consulted and planning for possible cardioversion but after cardioversion pt would need to be on at least 1 month of uninterrupted AC.  Per neruoICU pt can not start AC until 04/30.  Lovenox for VTE ppx cleared to be started tonight as per neuroICU.  Pt medically stable for transfer to medical service for continued management .    # Traumatic SAH and L-dorsal camila infarct   - Eye patch ordered for persistent double vision  - Repeat CTH w/ stable SAH   - CTA h/n w/ possible dural AV fistula   - s/p cerebral angiography on 04/24 w/ no evidence of dural AV fistula  - MRI brain w/ small acute infarct in the left dorsal camila and SAH in most prominent in the left perimesencephalic region  - TTE 4/2024 w/ LVEF 60-65%  - On ASA 81mg and cleared to start lovenox tonight for VTE ppx   - Can not start AC until 04/30 as per neuroICU  - c/w statin therapy   - Goal SBP <160  - Fall precautions   - c/w Neuro checks and VS q4h   - PT consulted and recommending home PT w/ assist   - Continue to monitor on telemetry   - Neurosurg recs noted   - Repeat CT head 4/29: Decreasing subarachnoid hemorrhage along the left perimesencephalic and quadrigeminal plate cisterns since 4/24/2024. No change in small amount of left frontal cortical subarachnoid hemorrhage    # Syncope likely 2/2 arrhythmia vs CVA   - Found to be in new Aflutter w/ intermittent bradycardia  - Concern for possible AV dysfunction/block   - Avoid AVN blockers   - No indication for PPM as pt is in Aflutter and pauses are <5 sec as per EP  - EP planning for possible DCCV vs ablation but would then need to be on uninterrupted AC for at least 30 days   - Earliest pt cleared to start AC would be 4/30/24  - Possible ILR prior to d/c as per EP   - TTE w/ LVEF 60-65% w/ no significant valvulopathy or evidence of WMA  - Monitor on telemetry     # Normocytic anemia / mild thrombocytopenia   H/H and plts remain stable   TSH, B12 and folate WNL  - Hemodynamically stable and no active bleeding   - Monitor CBC and transfuse as needed     # HTN  # Dyslipidemia  - PRN Hydralazine (SBP>160)  - statin and ezetimibe    # BPH  - Tamulosin    DVT ppx  - Lovenox SQ

## 2024-04-29 NOTE — DIETITIAN INITIAL EVALUATION ADULT - OTHER INFO
85 y/o M w/ PMH of gerd, HTN, HLD, CAD s/p PCI (not on DAPT despite it being prescribed to him) [place] [facility] [place] w/ subsequent head trauma.  [place] [facility] [place] for SAH and was transferred to [facility] neuro ICU on 04/23/24.  On initial evaluation on arrival pt was c/o double vision but denied HA.  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  CTA h/n showed prominent vasculature in the left perimesencephalic cistern/left tentorium of which differential included reactive changes due to known hemorrhage vs underlying dural AV fistula.  NeuroIR evaluated and pt underwent cerebral angiography on 04/24 and no dural AV fistula was found.  Hospital course complicated by new AFlutter.  EP was consulted and planning for possible cardioversion but after cardioversion pt would need to be on at least 1 month of uninterrupted AC.  Per neruoICU pt can not start AC until 04/30.  Lovenox for VTE ppx cleared to be started tonight as per neuroICU.  Pt medically stable for transfer to medical service for continued management .

## 2024-04-29 NOTE — DIETITIAN INITIAL EVALUATION ADULT - ORAL INTAKE PTA/DIET HISTORY
Patient tolerating regular diet well with good appetite/PO intake. Reports eating about 75% of breakfast this AM. Also receiving Ensure TID, but pt reports only drinking 2 per day. Recommend decreasing to BID. Pt reports vomiting a few days ago, but no c/o N/V since then. No c/o C/D. Reports his UBW is about 214 lbs, no reports of unintentional weight loss in the past year. Current weight 222 lbs, trace edema noted which may influence weights. Pt with no further nutrition-relared questions or concerns at this time. Will continue to monitor and follow up as needed. RD remains available.

## 2024-04-29 NOTE — DIETITIAN INITIAL EVALUATION ADULT - ADD RECOMMEND
1) Continue diet as tolerated.   2) Decreased Ensure to BID per pt's request.  3) Encourage po intake, monitor diet tolerance, and provide assistance at meals as needed.   4) Obtain daily weights to monitor trends.

## 2024-04-29 NOTE — PROGRESS NOTE ADULT - SUBJECTIVE AND OBJECTIVE BOX
No acute events documented overnight. Pt resting comfortably at time of assessment. Denies dizziness, LH, syncope.   Wearing eye patch for prior complaints of double vision.       TELE: Atrial flutter with controlled ventriclar rates. Slow VR in the 40s overnight. No significant pauses appreciated.      MEDICATIONS  (STANDING):  atorvastatin 40 milliGRAM(s) Oral at bedtime  chlorhexidine 2% Cloths 1 Application(s) Topical daily  enoxaparin Injectable 40 milliGRAM(s) SubCutaneous every 24 hours  ezetimibe 10 milliGRAM(s) Oral daily  famotidine    Tablet 20 milliGRAM(s) Oral daily  polyethylene glycol 3350 17 Gram(s) Oral daily  senna 2 Tablet(s) Oral at bedtime  tamsulosin 0.4 milliGRAM(s) Oral at bedtime    MEDICATIONS  (PRN):  acetaminophen     Tablet .. 650 milliGRAM(s) Oral every 6 hours PRN Mild Pain (1 - 3), Moderate Pain (4 - 6)  acetaminophen   IVPB .. 1000 milliGRAM(s) IV Intermittent every 6 hours PRN Temp greater or equal to 38C (100.4F), Mild Pain (1 - 3)  hydrALAZINE Injectable 10 milliGRAM(s) IV Push every 2 hours PRN SBP >160      Allergies  clarithromycin (Swelling)      Vital Signs Last 24 Hrs  T(C): 36.8 (29 Apr 2024 16:11), Max: 37.6 (29 Apr 2024 07:32)  T(F): 98.2 (29 Apr 2024 16:11), Max: 99.7 (29 Apr 2024 07:32)  HR: 65 (29 Apr 2024 16:11) (55 - 65)  BP: 122/72 (29 Apr 2024 16:11) (111/57 - 151/71)  BP(mean): --  RR: 18 (29 Apr 2024 16:11) (18 - 20)  SpO2: 97% (29 Apr 2024 16:11) (96% - 100%)    Parameters below as of 29 Apr 2024 16:11  Patient On (Oxygen Delivery Method): room air        Physical Exam:  Constitutional: NAD, AAOx3  Cardiovascular: +S1S2, irregularly irregular  Pulmonary: unlabored  GI: soft NTND +BS  Extremities: no pedal edema, +distal pulses b/l  Neuro: non focal, MURO x4    LABS:                        10.4   7.27  )-----------( 162      ( 29 Apr 2024 05:54 )             31.2     04-29    135  |  101  |  22.1<H>  ----------------------------<  108<H>  4.0   |  25.0  |  0.70    Ca    8.1<L>      29 Apr 2024 05:54        Urinalysis Basic - ( 29 Apr 2024 05:54 )    Color: x / Appearance: x / SG: x / pH: x  Gluc: 108 mg/dL / Ketone: x  / Bili: x / Urobili: x   Blood: x / Protein: x / Nitrite: x   Leuk Esterase: x / RBC: x / WBC x   Sq Epi: x / Non Sq Epi: x / Bacteria: x        84 year old male with GERD, HTN, HLD, CAD (PCI with JIMMIE to LAD 12/22), newly diagnosed a flutter, [place] [facility] [place]/ subsequent head trauma found to have a SAH and was transferred to [facility] neuro ICU on 04/23/24. Pt noted to be in AFL with a 4.6 second pause on telemetry, for which the EP service was consulted.    Pt remains in AFL with controlled ventricular rates, with episodes of bradycardia during sleeping hours (40s). No significant pauses noted. Anticoagulation remains on hold due to SAH.  Repeat CT head 4/29: Decreasing subarachnoid hemorrhage along the left perimesencephalic and quadrigeminal plate cisterns since 4/24/2024. No change in small amount of left frontal cortical subarachnoid hemorrhage    Recommendations  - continue telemetry monitoring  - Remain off AVN blocking agents as HRs remain controlled  - Start AC when cleared by Neuro Sx team. Reportedly plan to start tomorrow 4/30/24.  - Will wait to ensure tolerating anitcoagulation before DCCV (or eventual flutter ablation). Likely to be arranged on outpatient basis.   - Pt is a good candidate for LAAO as alternative to long-term anticoagulation. May consider CTA heart while inpatient to assess TARSHA anatomy.   - No indication for PPM at this time as pt is in typical Atrial flutter w/o significant pauses  - EP to follow  - Following discharge. Will likely recommend outpatient follow-up with Dr. Rivers given closer proximity to patient home.

## 2024-04-29 NOTE — DIETITIAN INITIAL EVALUATION ADULT - PERTINENT MEDS FT
MEDICATIONS  (STANDING):  enoxaparin Injectable 40 milliGRAM(s) SubCutaneous every 24 hours  ezetimibe 10 milliGRAM(s) Oral daily  famotidine    Tablet 20 milliGRAM(s) Oral daily  polyethylene glycol 3350 17 Gram(s) Oral daily  senna 2 Tablet(s) Oral at bedtime

## 2024-04-29 NOTE — PROGRESS NOTE ADULT - SUBJECTIVE AND OBJECTIVE BOX
Chief complaint: SAH    Patient seen and examined at bedside. No acute overnight events reported. No fever, chills, cough, nausea or vomiting.     Vital Signs Last 24 Hrs  T(F): 99.7 (29 Apr 2024 07:32), Max: 99.7 (29 Apr 2024 07:32)  HR: 65 (29 Apr 2024 07:32) (55 - 67)  BP: 136/74 (29 Apr 2024 07:32) (111/57 - 151/71)  RR: 20 (29 Apr 2024 07:32) (18 - 20)  SpO2: 100% (29 Apr 2024 07:32) (96% - 100%)    Physical Exam:  Constitutional: alert and oriented, in no acute distress   Neck: Soft and supple  Respiratory: Clear to auscultation bilaterally  Cardiovascular: Regular rate and rhyhtm  Gastrointestinal: Soft, non-tender to palpation, +bs  Vascular: 2+ peripheral pulses  Neurological: A/O x 3  Musculoskeletal: no lower extremity edema bilaterally    Labs:                        10.4   7.27  )-----------( 162      ( 29 Apr 2024 05:54 )             31.2   04-29    135  |  101  |  22.1<H>  ----------------------------<  108<H>  4.0   |  25.0  |  0.70    Ca    8.1<L>      29 Apr 2024 05:54

## 2024-04-30 LAB
ANION GAP SERPL CALC-SCNC: 11 MMOL/L — SIGNIFICANT CHANGE UP (ref 5–17)
BASOPHILS # BLD AUTO: 0.03 K/UL — SIGNIFICANT CHANGE UP (ref 0–0.2)
BASOPHILS NFR BLD AUTO: 0.3 % — SIGNIFICANT CHANGE UP (ref 0–2)
BUN SERPL-MCNC: 22.1 MG/DL — HIGH (ref 8–20)
CALCIUM SERPL-MCNC: 8.6 MG/DL — SIGNIFICANT CHANGE UP (ref 8.4–10.5)
CHLORIDE SERPL-SCNC: 102 MMOL/L — SIGNIFICANT CHANGE UP (ref 96–108)
CO2 SERPL-SCNC: 24 MMOL/L — SIGNIFICANT CHANGE UP (ref 22–29)
CREAT SERPL-MCNC: 0.86 MG/DL — SIGNIFICANT CHANGE UP (ref 0.5–1.3)
EGFR: 85 ML/MIN/1.73M2 — SIGNIFICANT CHANGE UP
EOSINOPHIL # BLD AUTO: 0.13 K/UL — SIGNIFICANT CHANGE UP (ref 0–0.5)
EOSINOPHIL NFR BLD AUTO: 1.5 % — SIGNIFICANT CHANGE UP (ref 0–6)
GLUCOSE SERPL-MCNC: 117 MG/DL — HIGH (ref 70–99)
HCT VFR BLD CALC: 33.3 % — LOW (ref 39–50)
HGB BLD-MCNC: 11.4 G/DL — LOW (ref 13–17)
IMM GRANULOCYTES NFR BLD AUTO: 0.3 % — SIGNIFICANT CHANGE UP (ref 0–0.9)
LYMPHOCYTES # BLD AUTO: 1.42 K/UL — SIGNIFICANT CHANGE UP (ref 1–3.3)
LYMPHOCYTES # BLD AUTO: 16.4 % — SIGNIFICANT CHANGE UP (ref 13–44)
MCHC RBC-ENTMCNC: 29 PG — SIGNIFICANT CHANGE UP (ref 27–34)
MCHC RBC-ENTMCNC: 34.2 GM/DL — SIGNIFICANT CHANGE UP (ref 32–36)
MCV RBC AUTO: 84.7 FL — SIGNIFICANT CHANGE UP (ref 80–100)
MONOCYTES # BLD AUTO: 0.82 K/UL — SIGNIFICANT CHANGE UP (ref 0–0.9)
MONOCYTES NFR BLD AUTO: 9.5 % — SIGNIFICANT CHANGE UP (ref 2–14)
NEUTROPHILS # BLD AUTO: 6.21 K/UL — SIGNIFICANT CHANGE UP (ref 1.8–7.4)
NEUTROPHILS NFR BLD AUTO: 72 % — SIGNIFICANT CHANGE UP (ref 43–77)
PLATELET # BLD AUTO: 189 K/UL — SIGNIFICANT CHANGE UP (ref 150–400)
POTASSIUM SERPL-MCNC: 4 MMOL/L — SIGNIFICANT CHANGE UP (ref 3.5–5.3)
POTASSIUM SERPL-SCNC: 4 MMOL/L — SIGNIFICANT CHANGE UP (ref 3.5–5.3)
RBC # BLD: 3.93 M/UL — LOW (ref 4.2–5.8)
RBC # FLD: 13.8 % — SIGNIFICANT CHANGE UP (ref 10.3–14.5)
SODIUM SERPL-SCNC: 137 MMOL/L — SIGNIFICANT CHANGE UP (ref 135–145)
WBC # BLD: 8.64 K/UL — SIGNIFICANT CHANGE UP (ref 3.8–10.5)
WBC # FLD AUTO: 8.64 K/UL — SIGNIFICANT CHANGE UP (ref 3.8–10.5)

## 2024-04-30 PROCEDURE — 99233 SBSQ HOSP IP/OBS HIGH 50: CPT

## 2024-04-30 PROCEDURE — 99232 SBSQ HOSP IP/OBS MODERATE 35: CPT

## 2024-04-30 PROCEDURE — 73562 X-RAY EXAM OF KNEE 3: CPT | Mod: 26,RT

## 2024-04-30 RX ORDER — ACETAMINOPHEN 500 MG
975 TABLET ORAL EVERY 8 HOURS
Refills: 0 | Status: DISCONTINUED | OUTPATIENT
Start: 2024-04-30 | End: 2024-05-01

## 2024-04-30 RX ORDER — APIXABAN 2.5 MG/1
5 TABLET, FILM COATED ORAL EVERY 12 HOURS
Refills: 0 | Status: DISCONTINUED | OUTPATIENT
Start: 2024-04-30 | End: 2024-05-01

## 2024-04-30 RX ADMIN — TAMSULOSIN HYDROCHLORIDE 0.4 MILLIGRAM(S): 0.4 CAPSULE ORAL at 21:02

## 2024-04-30 RX ADMIN — CHLORHEXIDINE GLUCONATE 1 APPLICATION(S): 213 SOLUTION TOPICAL at 09:09

## 2024-04-30 RX ADMIN — Medication 975 MILLIGRAM(S): at 14:15

## 2024-04-30 RX ADMIN — Medication 975 MILLIGRAM(S): at 22:00

## 2024-04-30 RX ADMIN — Medication 650 MILLIGRAM(S): at 04:39

## 2024-04-30 RX ADMIN — POLYETHYLENE GLYCOL 3350 17 GRAM(S): 17 POWDER, FOR SOLUTION ORAL at 09:04

## 2024-04-30 RX ADMIN — ATORVASTATIN CALCIUM 40 MILLIGRAM(S): 80 TABLET, FILM COATED ORAL at 21:02

## 2024-04-30 RX ADMIN — Medication 975 MILLIGRAM(S): at 13:15

## 2024-04-30 RX ADMIN — APIXABAN 5 MILLIGRAM(S): 2.5 TABLET, FILM COATED ORAL at 09:04

## 2024-04-30 RX ADMIN — EZETIMIBE 10 MILLIGRAM(S): 10 TABLET ORAL at 09:04

## 2024-04-30 RX ADMIN — Medication 975 MILLIGRAM(S): at 21:02

## 2024-04-30 RX ADMIN — APIXABAN 5 MILLIGRAM(S): 2.5 TABLET, FILM COATED ORAL at 17:00

## 2024-04-30 RX ADMIN — FAMOTIDINE 20 MILLIGRAM(S): 10 INJECTION INTRAVENOUS at 09:05

## 2024-04-30 NOTE — PROGRESS NOTE ADULT - SUBJECTIVE AND OBJECTIVE BOX
Chief complaint: Fall    Patient seen and examined at bedside. No acute overnight events reported. No fever, chills, nausea or vomiting.     Vital Signs Last 24 Hrs  T(F): 97.9 (30 Apr 2024 07:30), Max: 99.3 (30 Apr 2024 00:44)  HR: 67 (30 Apr 2024 07:30) (63 - 88)  BP: 114/69 (30 Apr 2024 07:30) (114/69 - 144/70)  RR: 20 (30 Apr 2024 07:30) (18 - 20)  SpO2: 100% (30 Apr 2024 07:30) (95% - 100%)    Physical Exam:  Constitutional: alert and oriented, in no acute distress   Neck: Soft and supple  Respiratory: Clear to auscultation bilaterally  Cardiovascular: Regular rate and rhyhtm  Gastrointestinal: Soft, non-tender to palpation, +bs  Vascular: 2+ peripheral pulses  Neurological: A/O x 3  Musculoskeletal: no lower extremity edema bilaterally    Labs:                        11.4   8.64  )-----------( 189      ( 30 Apr 2024 05:20 )             33.3   04-30    137  |  102  |  22.1<H>  ----------------------------<  117<H>  4.0   |  24.0  |  0.86    Ca    8.6      30 Apr 2024 05:20

## 2024-04-30 NOTE — PROGRESS NOTE ADULT - SUBJECTIVE AND OBJECTIVE BOX
No acute events documented overnight.  Started on Eliquis this morning.  Pt resting comfortably at time of assessment. Denies dizziness, LH, syncope.   Wearing eye patch for prior complaints of double vision.   Complaints of progressive R knee pain.  Xray ordered and pending.     TELE:  AFlutter with controlled ventriclar rates. Slow VR in the 40s overnight. No significant pauses appreciated.      MEDICATIONS  (STANDING):  acetaminophen     Tablet .. 975 milliGRAM(s) Oral every 8 hours  apixaban 5 milliGRAM(s) Oral every 12 hours  atorvastatin 40 milliGRAM(s) Oral at bedtime  chlorhexidine 2% Cloths 1 Application(s) Topical daily  ezetimibe 10 milliGRAM(s) Oral daily  famotidine    Tablet 20 milliGRAM(s) Oral daily  polyethylene glycol 3350 17 Gram(s) Oral daily  senna 2 Tablet(s) Oral at bedtime  tamsulosin 0.4 milliGRAM(s) Oral at bedtime    MEDICATIONS  (PRN):  hydrALAZINE Injectable 10 milliGRAM(s) IV Push every 2 hours PRN SBP >160      Allergies    clarithromycin (Swelling)    Intolerances        Vital Signs Last 24 Hrs  T(C): 36.7 (30 Apr 2024 12:32), Max: 37.4 (30 Apr 2024 00:44)  T(F): 98.1 (30 Apr 2024 12:32), Max: 99.3 (30 Apr 2024 00:44)  HR: 62 (30 Apr 2024 12:32) (62 - 88)  BP: 121/71 (30 Apr 2024 12:32) (114/69 - 144/70)  BP(mean): --  RR: 17 (30 Apr 2024 12:32) (17 - 20)  SpO2: 99% (30 Apr 2024 12:32) (95% - 100%)    Parameters below as of 30 Apr 2024 12:32  Patient On (Oxygen Delivery Method): room air        Physical Exam:  Constitutional: NAD, AAOx3  Cardiovascular: +S1S2, irregularly irregular  Pulmonary: CTA b/l, unlabored  GI: soft NTND +BS  Extremities: no pedal edema, +distal pulses b/l  Neuro: non focal, MURO x4    LABS:                        11.4   8.64  )-----------( 189      ( 30 Apr 2024 05:20 )             33.3     04-30    137  |  102  |  22.1<H>  ----------------------------<  117<H>  4.0   |  24.0  |  0.86    Ca    8.6      30 Apr 2024 05:20        Urinalysis Basic - ( 30 Apr 2024 05:20 )    Color: x / Appearance: x / SG: x / pH: x  Gluc: 117 mg/dL / Ketone: x  / Bili: x / Urobili: x   Blood: x / Protein: x / Nitrite: x   Leuk Esterase: x / RBC: x / WBC x   Sq Epi: x / Non Sq Epi: x / Bacteria: x      ASSESSMENT/PLAN:    84 year old male with GERD, HTN, HLD, CAD (PCI with JIMMIE to LAD 12/22), newly diagnosed a flutter, who initially presented to [facility] [place] [place] after syncopal episode w/ subsequent head trauma found to have a SAH and was transferred to [facility] neuro ICU on 04/23/24. Pt noted to be in AFL with a 4.6 second pause on telemetry, for which the EP service was consulted.    Pt remains in AFL with controlled ventricular rates, with episodes of bradycardia during sleeping hours (40s). No significant pauses noted. Anticoagulation held due to SAH.  Repeat CT head 4/29: Decreasing subarachnoid hemorrhage along the left perimesencephalic and quadrigeminal plate cisterns since 4/24/2024. No change in small amount of left frontal cortical subarachnoid hemorrhage    Started on Eliquis 5mg BID This AM.     Recommendations  - continue telemetry monitoring  - Remain off AVN blocking agents as HRs remain controlled  - Started on Eliquis 5mg BID this AM.  - Will wait to ensure tolerating anitcoagulation before DCCV (or eventual flutter ablation). Likely to be arranged on outpatient basis.   - Pt is a good candidate for LAAO as alternative to long-term anticoagulation. Recommend obtaining CTA heart this admission, to assess TARSHA anatomy. Patient declined and would like to discuss further as outpatient.   - No indication for PPM at this time as pt is in typical Atrial flutter w/o significant pauses  - EP to follow  - Following discharge. Will likely recommend outpatient follow-up with Dr. Rivers given closer proximity to patient home.

## 2024-04-30 NOTE — PROGRESS NOTE ADULT - ASSESSMENT
83 y/o M w/ PMH of gerd, HTN, HLD, CAD s/p PCI (not on DAPT despite it being prescribed to him) initially presented to [facility] [place] syncopal episode w/ subsequent head trauma.  w/u at [facility] [place] was significant for SAH and was transferred to [facility] neuro ICU on 04/23/24.  On initial evaluation on arrival pt was c/o double vision but denied HA.  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  CTA h/n showed prominent vasculature in the left perimesencephalic cistern/left tentorium of which differential included reactive changes due to known hemorrhage vs underlying dural AV fistula.  NeuroIR evaluated and pt underwent cerebral angiography on 04/24 and no dural AV fistula was found.  Hospital course complicated by new AFlutter.  EP was consulted and planning for possible cardioversion but after cardioversion pt would need to be on at least 1 month of uninterrupted AC.  Per neruoICU pt can not start AC until 04/30.  Lovenox for VTE ppx cleared to be started tonight as per neuroICU.  Pt medically stable for transfer to medical service for continued management .    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 No change in small amount of left frontal cortical subarachnoid hemorrhage    Syncope likely 2/2 arrhythmia vs CVA   - Found to be in new Aflutter w/ intermittent bradycardia  - Concern for possible AV dysfunction/block   - Avoid AVN blockers   - No indication for PPM as pt is in Aflutter and pauses are <5 sec as per EP  - EP planning for possible DCCV vs ablation but would then need to be on uninterrupted AC for at least 30 days   - TTE w/ LVEF 60-65% w/ no significant valvulopathy or evidence of WMA  - EP following, will wait to ensure tolerating AC before DCCV - likely outpatient   - Start Eliquis     Normocytic anemia / mild thrombocytopenia   H/H and plts remain stable   TSH, B12 and folate WNL  - Hemodynamically stable and no active bleeding   - Monitor CBC and transfuse as needed     HTN/HLD  - PRN Hydralazine (SBP>160)  - statin and ezetimibe    BPH  - Tamulosin    DVT ppx  - Lovenox SQ

## 2024-04-30 NOTE — PROGRESS NOTE ADULT - TIME BILLING
discussion with pt and his wife regarding all treatment options, including AF ablation, DCCV, ILR and LAAO procedure.

## 2024-05-01 ENCOUNTER — TRANSCRIPTION ENCOUNTER (OUTPATIENT)
Age: 84
End: 2024-05-01

## 2024-05-01 VITALS
SYSTOLIC BLOOD PRESSURE: 130 MMHG | RESPIRATION RATE: 18 BRPM | DIASTOLIC BLOOD PRESSURE: 77 MMHG | HEART RATE: 65 BPM | OXYGEN SATURATION: 98 % | TEMPERATURE: 99 F

## 2024-05-01 LAB
ANION GAP SERPL CALC-SCNC: 10 MMOL/L — SIGNIFICANT CHANGE UP (ref 5–17)
BASOPHILS # BLD AUTO: 0.03 K/UL — SIGNIFICANT CHANGE UP (ref 0–0.2)
BASOPHILS NFR BLD AUTO: 0.4 % — SIGNIFICANT CHANGE UP (ref 0–2)
BUN SERPL-MCNC: 23 MG/DL — HIGH (ref 8–20)
CALCIUM SERPL-MCNC: 8.4 MG/DL — SIGNIFICANT CHANGE UP (ref 8.4–10.5)
CHLORIDE SERPL-SCNC: 102 MMOL/L — SIGNIFICANT CHANGE UP (ref 96–108)
CO2 SERPL-SCNC: 26 MMOL/L — SIGNIFICANT CHANGE UP (ref 22–29)
CREAT SERPL-MCNC: 0.74 MG/DL — SIGNIFICANT CHANGE UP (ref 0.5–1.3)
EGFR: 89 ML/MIN/1.73M2 — SIGNIFICANT CHANGE UP
EOSINOPHIL # BLD AUTO: 0.28 K/UL — SIGNIFICANT CHANGE UP (ref 0–0.5)
EOSINOPHIL NFR BLD AUTO: 3.8 % — SIGNIFICANT CHANGE UP (ref 0–6)
GLUCOSE SERPL-MCNC: 108 MG/DL — HIGH (ref 70–99)
HCT VFR BLD CALC: 32.2 % — LOW (ref 39–50)
HGB BLD-MCNC: 10.5 G/DL — LOW (ref 13–17)
IMM GRANULOCYTES NFR BLD AUTO: 0.4 % — SIGNIFICANT CHANGE UP (ref 0–0.9)
LYMPHOCYTES # BLD AUTO: 1.3 K/UL — SIGNIFICANT CHANGE UP (ref 1–3.3)
LYMPHOCYTES # BLD AUTO: 17.6 % — SIGNIFICANT CHANGE UP (ref 13–44)
MCHC RBC-ENTMCNC: 28.1 PG — SIGNIFICANT CHANGE UP (ref 27–34)
MCHC RBC-ENTMCNC: 32.6 GM/DL — SIGNIFICANT CHANGE UP (ref 32–36)
MCV RBC AUTO: 86.1 FL — SIGNIFICANT CHANGE UP (ref 80–100)
MONOCYTES # BLD AUTO: 0.59 K/UL — SIGNIFICANT CHANGE UP (ref 0–0.9)
MONOCYTES NFR BLD AUTO: 8 % — SIGNIFICANT CHANGE UP (ref 2–14)
NEUTROPHILS # BLD AUTO: 5.16 K/UL — SIGNIFICANT CHANGE UP (ref 1.8–7.4)
NEUTROPHILS NFR BLD AUTO: 69.8 % — SIGNIFICANT CHANGE UP (ref 43–77)
PLATELET # BLD AUTO: 199 K/UL — SIGNIFICANT CHANGE UP (ref 150–400)
POTASSIUM SERPL-MCNC: 3.8 MMOL/L — SIGNIFICANT CHANGE UP (ref 3.5–5.3)
POTASSIUM SERPL-SCNC: 3.8 MMOL/L — SIGNIFICANT CHANGE UP (ref 3.5–5.3)
RBC # BLD: 3.74 M/UL — LOW (ref 4.2–5.8)
RBC # FLD: 14 % — SIGNIFICANT CHANGE UP (ref 10.3–14.5)
SODIUM SERPL-SCNC: 138 MMOL/L — SIGNIFICANT CHANGE UP (ref 135–145)
WBC # BLD: 7.39 K/UL — SIGNIFICANT CHANGE UP (ref 3.8–10.5)
WBC # FLD AUTO: 7.39 K/UL — SIGNIFICANT CHANGE UP (ref 3.8–10.5)

## 2024-05-01 PROCEDURE — 85610 PROTHROMBIN TIME: CPT

## 2024-05-01 PROCEDURE — 80076 HEPATIC FUNCTION PANEL: CPT

## 2024-05-01 PROCEDURE — 99239 HOSP IP/OBS DSCHRG MGMT >30: CPT

## 2024-05-01 PROCEDURE — 70496 CT ANGIOGRAPHY HEAD: CPT | Mod: MC

## 2024-05-01 PROCEDURE — 82553 CREATINE MB FRACTION: CPT

## 2024-05-01 PROCEDURE — 97110 THERAPEUTIC EXERCISES: CPT

## 2024-05-01 PROCEDURE — 80061 LIPID PANEL: CPT

## 2024-05-01 PROCEDURE — 93005 ELECTROCARDIOGRAM TRACING: CPT

## 2024-05-01 PROCEDURE — C1760: CPT

## 2024-05-01 PROCEDURE — 99223 1ST HOSP IP/OBS HIGH 75: CPT

## 2024-05-01 PROCEDURE — 83605 ASSAY OF LACTIC ACID: CPT

## 2024-05-01 PROCEDURE — 86850 RBC ANTIBODY SCREEN: CPT

## 2024-05-01 PROCEDURE — C1769: CPT

## 2024-05-01 PROCEDURE — 80048 BASIC METABOLIC PNL TOTAL CA: CPT

## 2024-05-01 PROCEDURE — 93306 TTE W/DOPPLER COMPLETE: CPT

## 2024-05-01 PROCEDURE — 87641 MR-STAPH DNA AMP PROBE: CPT

## 2024-05-01 PROCEDURE — 84484 ASSAY OF TROPONIN QUANT: CPT

## 2024-05-01 PROCEDURE — 99285 EMERGENCY DEPT VISIT HI MDM: CPT

## 2024-05-01 PROCEDURE — 97116 GAIT TRAINING THERAPY: CPT

## 2024-05-01 PROCEDURE — 85027 COMPLETE CBC AUTOMATED: CPT

## 2024-05-01 PROCEDURE — C1887: CPT

## 2024-05-01 PROCEDURE — 70498 CT ANGIOGRAPHY NECK: CPT | Mod: MC

## 2024-05-01 PROCEDURE — 84100 ASSAY OF PHOSPHORUS: CPT

## 2024-05-01 PROCEDURE — 85730 THROMBOPLASTIN TIME PARTIAL: CPT

## 2024-05-01 PROCEDURE — A9579: CPT

## 2024-05-01 PROCEDURE — 70553 MRI BRAIN STEM W/O & W/DYE: CPT | Mod: MC

## 2024-05-01 PROCEDURE — 82550 ASSAY OF CK (CPK): CPT

## 2024-05-01 PROCEDURE — 84443 ASSAY THYROID STIM HORMONE: CPT

## 2024-05-01 PROCEDURE — 99232 SBSQ HOSP IP/OBS MODERATE 35: CPT

## 2024-05-01 PROCEDURE — 86900 BLOOD TYPING SEROLOGIC ABO: CPT

## 2024-05-01 PROCEDURE — 36227 PLACE CATH XTRNL CAROTID: CPT

## 2024-05-01 PROCEDURE — 82746 ASSAY OF FOLIC ACID SERUM: CPT

## 2024-05-01 PROCEDURE — 80307 DRUG TEST PRSMV CHEM ANLYZR: CPT

## 2024-05-01 PROCEDURE — 86901 BLOOD TYPING SEROLOGIC RH(D): CPT

## 2024-05-01 PROCEDURE — G0480: CPT

## 2024-05-01 PROCEDURE — 36224 PLACE CATH CAROTD ART: CPT

## 2024-05-01 PROCEDURE — 97167 OT EVAL HIGH COMPLEX 60 MIN: CPT

## 2024-05-01 PROCEDURE — 36226 PLACE CATH VERTEBRAL ART: CPT

## 2024-05-01 PROCEDURE — 97530 THERAPEUTIC ACTIVITIES: CPT

## 2024-05-01 PROCEDURE — 85025 COMPLETE CBC W/AUTO DIFF WBC: CPT

## 2024-05-01 PROCEDURE — 70450 CT HEAD/BRAIN W/O DYE: CPT | Mod: MC

## 2024-05-01 PROCEDURE — 36415 COLL VENOUS BLD VENIPUNCTURE: CPT

## 2024-05-01 PROCEDURE — 83735 ASSAY OF MAGNESIUM: CPT

## 2024-05-01 PROCEDURE — 83036 HEMOGLOBIN GLYCOSYLATED A1C: CPT

## 2024-05-01 PROCEDURE — 86618 LYME DISEASE ANTIBODY: CPT

## 2024-05-01 PROCEDURE — C1894: CPT

## 2024-05-01 PROCEDURE — 87640 STAPH A DNA AMP PROBE: CPT

## 2024-05-01 PROCEDURE — 82607 VITAMIN B-12: CPT

## 2024-05-01 PROCEDURE — 73562 X-RAY EXAM OF KNEE 3: CPT

## 2024-05-01 RX ORDER — APIXABAN 2.5 MG/1
1 TABLET, FILM COATED ORAL
Refills: 0 | DISCHARGE

## 2024-05-01 RX ORDER — ASPIRIN/CALCIUM CARB/MAGNESIUM 324 MG
1 TABLET ORAL
Refills: 0 | DISCHARGE

## 2024-05-01 RX ORDER — APIXABAN 2.5 MG/1
1 TABLET, FILM COATED ORAL
Qty: 60 | Refills: 0
Start: 2024-05-01 | End: 2024-05-30

## 2024-05-01 RX ORDER — METOPROLOL TARTRATE 50 MG
1 TABLET ORAL
Refills: 0 | DISCHARGE

## 2024-05-01 RX ORDER — OLMESARTAN MEDOXOMIL 5 MG/1
1 TABLET, FILM COATED ORAL
Refills: 0 | DISCHARGE

## 2024-05-01 RX ADMIN — Medication 975 MILLIGRAM(S): at 06:11

## 2024-05-01 RX ADMIN — APIXABAN 5 MILLIGRAM(S): 2.5 TABLET, FILM COATED ORAL at 06:12

## 2024-05-01 RX ADMIN — EZETIMIBE 10 MILLIGRAM(S): 10 TABLET ORAL at 12:41

## 2024-05-01 RX ADMIN — FAMOTIDINE 20 MILLIGRAM(S): 10 INJECTION INTRAVENOUS at 12:42

## 2024-05-01 RX ADMIN — Medication 975 MILLIGRAM(S): at 12:45

## 2024-05-01 RX ADMIN — CHLORHEXIDINE GLUCONATE 1 APPLICATION(S): 213 SOLUTION TOPICAL at 12:38

## 2024-05-01 NOTE — PROGRESS NOTE ADULT - ASSESSMENT
A/P  84 year old male with GERD, HTN, HLD, CAD (PCI with JIMMIE to LAD 12/22), newly diagnosed a flutter, who initially presented to [facility] [place] after syncopal episode w/ subsequent head trauma found to have a SAH and was transferred to [facility] neuro ICU on 04/23/24. Pt noted to be in AFL with a 4.6 second pause on telemetry, for which the EP service was consulted.    - Remain off AVN blocking agents as HRs remain very well controlled  - Continue Eliquis 5mg BID - tolerating thus far.   - Elective ILR insertion, ablation vs DCCV, and Watchman can be pursued as outpatient with Dr. Rivers after recovery.   - No objection to discharge planning from EP perspective.

## 2024-05-01 NOTE — PROGRESS NOTE ADULT - NS ATTEND AMEND GEN_ALL_CORE FT
.  .  Remains in rate controlled atrial flutter. Plan to restart AC on 4/30 per Hospitalist note, would confirm with Neurology/Neurosurgery. If patient can tolerate anticoagulation, then would agree with catheter ablation. Can consider LAAO as well as patient at high risk of developing AF and is a poor candidate for long term anticoagulation.    Mikhail Cueto MD  Clinical Cardiac Electrophysiology    A total of 38 minutes were spent on this patient encounter.
I reviewed Mr. Montemayor's CT angiogram and MRI.  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  This lesion will need further characterization with cerebral angiography which I am planning to perform later today.  I discussed briefly with the patient the risk of rerupture especially in the anticipated need of anticoagulation for his atrial flutter.  After the angiogram is performed we will have a discussion of the risks and benefits of anticoagulation with or without embolization of his lesion as well as a discussion of the risks, benefits and alternatives of treatment of his vascular lesion.  The patient expresses understanding and all questions were answered.  I will follow-up with the patient later today after angiogram is complete.
Pt remains in atrial flutter, but HRs remain much improved, with resting rates in 70s bpm today, off BB.   Just started anticoagulation today, and tolerating thus far.   If he continues to tolerate OAC, would consider rhythm control, with SERGEY/DCCV vs flutter ablation, and this will be pursued as outpt (he plans to followup with Dr Rivers in [place]).   Offered ILR for ongoing monitoring- he will consider this, but not yet agreeable.   Finally we again discussed option for LAAO occlusion as an alternative to long-term anticoagulation. As he is high risk of both stroke and further bleeding complications, with recent traumatic SDH, I do think this is a good option for him. He wants to consider this further as an outpt as well. He can discuss with Dr Rivers or myself in office. I offered CT to evaluate TARSHA anatomy while he is still inpatient, but he refused this thus far, and reports willing to get CT as outpt.
pt remains in atrial flutter, but rates in 60s bpm, with no daytime bradycardia events seen thus far while off rate control. will continue to hold all rate slowing medications.   t/c restarting anticoagulation once cleared by neurosurgery.   However, will wait to ensure tolerating anitcoagulation before DCCV (or eventual flutter ablation).   Also good candidate for LAAO as alternative to long-term anticoagulation. For this would require intraprocedural heparin and possible short-term anticoagulation. will likely offer this as outpt, particularly if heart rates remain well controlled.   continue tele. ep to followup
Agree with above. Imaging reviewed. Discussed with patient. I spent 50 minutes.
agree with a/p as above  pe typical atrial flutter rate controlled in the setting of AV node disease,  avoid any av node blocking agents  cont eliquis  follow with Dr Rivers EP as outpatient   likely benefits from cti flutter ablation in the near future  I spent a total of 35 minutes on the encounter, including chart review, evaluating and discussing treatment options with the patient, as well as counseling and coordination as stated above.

## 2024-05-01 NOTE — DISCHARGE NOTE PROVIDER - CARE PROVIDER_API CALL
Jeffrey Cardenas  Cardiac Electrophysiology  [address] [place] [id number]  Phone: [phone]  Fax: [phone]  Follow Up Time: 1 week    Jorge Irwin  Neurosurgery  [address] [place] [id number]  Phone: [phone]  Fax: [phone]  Follow Up Time: 2 weeks

## 2024-05-01 NOTE — DISCHARGE NOTE PROVIDER - NSDCCPCAREPLAN_GEN_ALL_CORE_FT
PRINCIPAL DISCHARGE DIAGNOSIS  Diagnosis: SAH (subarachnoid hemorrhage)  Assessment and Plan of Treatment: - s/p cerebral angiography on 04/24 w/ no evidence of dural AV fistula  - MRI brain w/ small acute infarct in the left dorsal camila and SAH in most prominent in the left perimesencephalic region  - TTE 4/2024 w/ LVEF 60-65%  - Repeat CT head 4/29: Decreasing subarachnoid hemorrhage along the left perimesencephalic and quadrigeminal plate cisterns since 4/24/2024. No change in small amount of left frontal cortical subarachnoid hemorrhage  - Follow up with Neurosurgery as an outpatient        SECONDARY DISCHARGE DIAGNOSES  Diagnosis: Syncope  Assessment and Plan of Treatment: - Found to be in new Aflutter w/ intermittent bradycardia  - Tolerating Eliquis  - Follow up with EP

## 2024-05-01 NOTE — PROGRESS NOTE ADULT - PROVIDER SPECIALTY LIST ADULT
Hospitalist
Neurosurgery
Electrophysiology
Electrophysiology
Neurosurgery
Neurosurgery
SICU
Electrophysiology
Hospitalist
NSICU

## 2024-05-01 NOTE — DISCHARGE NOTE PROVIDER - NSDCMRMEDTOKEN_GEN_ALL_CORE_FT
apixaban 5 mg oral tablet: 1 tab(s) orally 2 times a day  atorvastatin 40 mg oral tablet: 1 tab(s) orally once a day  ezetimibe 10 mg oral tablet: 1 tab(s) orally once a day  famotidine 20 mg oral tablet: 1 tab(s) orally 2 times a day  tamsulosin 0.4 mg oral capsule: 1 cap(s) orally once a day

## 2024-05-01 NOTE — PROGRESS NOTE ADULT - SUBJECTIVE AND OBJECTIVE BOX
Patient seen today in chair OOB. Eager to go home. Declining ILR insertion at this time. Wants to follow up with Dr. Rivers at [facility] [place]. Telemetry reviewed     TELE: Aflutter with rates in the 60s. No other events recorded. No pauses exceeding 5 seconds recorded.     MEDICATIONS  (STANDING):  acetaminophen     Tablet .. 975 milliGRAM(s) Oral every 8 hours  apixaban 5 milliGRAM(s) Oral every 12 hours  atorvastatin 40 milliGRAM(s) Oral at bedtime  chlorhexidine 2% Cloths 1 Application(s) Topical daily  ezetimibe 10 milliGRAM(s) Oral daily  famotidine    Tablet 20 milliGRAM(s) Oral daily  polyethylene glycol 3350 17 Gram(s) Oral daily  senna 2 Tablet(s) Oral at bedtime  tamsulosin 0.4 milliGRAM(s) Oral at bedtime    MEDICATIONS  (PRN):  hydrALAZINE Injectable 10 milliGRAM(s) IV Push every 2 hours PRN SBP >160    Allergies  clarithromycin (Swelling)    PAST MEDICAL & SURGICAL HISTORY:    Vital Signs Last 24 Hrs  T(C): 36.4 (01 May 2024 07:34), Max: 36.8 (01 May 2024 00:00)  T(F): 97.5 (01 May 2024 07:34), Max: 98.3 (01 May 2024 00:00)  HR: 62 (01 May 2024 07:34) (56 - 82)  BP: 128/62 (01 May 2024 07:34) (120/68 - 129/68)  RR: 18 (01 May 2024 07:34) (17 - 18)  SpO2: 97% (01 May 2024 07:34) (93% - 99%)    Physical Exam:  Constitutional: NAD, AAOx3  Cardiovascular: +S1S2 RRR  Pulmonary: CTA b/l, unlabored  GI: soft NTND +BS  Extremities: no pedal edema, +distal pulses b/l  Neuro: non focal, MURO x4    LABS:                        10.5   7.39  )-----------( 199      ( 01 May 2024 04:45 )             32.2     138  |  102  |  23.0<H>  ----------------------------<  108<H>  3.8   |  26.0  |  0.74  Ca    8.4      01 May 2024 04:45    A/P  84 year old male with GERD, HTN, HLD, CAD (PCI with JIMMIE to LAD 12/22), newly diagnosed a flutter, who initially presented to [facility] [place] after syncopal episode w/ subsequent head trauma found to have a SAH and was transferred to [facility] neuro ICU on 04/23/24. Pt noted to be in AFL with a 4.6 second pause on telemetry, for which the EP service was consulted.    - Remain off AVN blocking agents as HRs remain very well controlled  - Continue Eliquis 5mg BID - tolerating thus far.   - Elective ILR insertion, ablation vs DCCV, and Watchman can be pursued as outpatient with Dr. Rivers after recovery.   - No objection to discharge planning from EP perspective.    Patient seen today in chair OOB. Eager to go home. Declining ILR insertion at this time. Wants to follow up with Dr. Rivers at [facility]. Telemetry reviewed     TELE: Aflutter with rates in the 60s. No other events recorded. No pauses exceeding 5 seconds recorded.     MEDICATIONS  (STANDING):  acetaminophen     Tablet .. 975 milliGRAM(s) Oral every 8 hours  apixaban 5 milliGRAM(s) Oral every 12 hours  atorvastatin 40 milliGRAM(s) Oral at bedtime  chlorhexidine 2% Cloths 1 Application(s) Topical daily  ezetimibe 10 milliGRAM(s) Oral daily  famotidine    Tablet 20 milliGRAM(s) Oral daily  polyethylene glycol 3350 17 Gram(s) Oral daily  senna 2 Tablet(s) Oral at bedtime  tamsulosin 0.4 milliGRAM(s) Oral at bedtime    MEDICATIONS  (PRN):  hydrALAZINE Injectable 10 milliGRAM(s) IV Push every 2 hours PRN SBP >160    Allergies  clarithromycin (Swelling)    PAST MEDICAL & SURGICAL HISTORY:    Vital Signs Last 24 Hrs  T(C): 36.4 (01 May 2024 07:34), Max: 36.8 (01 May 2024 00:00)  T(F): 97.5 (01 May 2024 07:34), Max: 98.3 (01 May 2024 00:00)  HR: 62 (01 May 2024 07:34) (56 - 82)  BP: 128/62 (01 May 2024 07:34) (120/68 - 129/68)  RR: 18 (01 May 2024 07:34) (17 - 18)  SpO2: 97% (01 May 2024 07:34) (93% - 99%)    Physical Exam:  Constitutional: NAD, AAOx3  Cardiovascular: +S1S2 RRR  Pulmonary: CTA b/l, unlabored  GI: soft NTND +BS  Extremities: no pedal edema, +distal pulses b/l  Neuro: non focal, MURO x4    LABS:                        10.5   7.39  )-----------( 199      ( 01 May 2024 04:45 )             32.2     138  |  102  |  23.0<H>  ----------------------------<  108<H>  3.8   |  26.0  |  0.74  Ca    8.4      01 May 2024 04:45

## 2024-05-01 NOTE — CONSULT NOTE ADULT - SUBJECTIVE AND OBJECTIVE BOX
KaylynyM was admitted on  from [facility] after a fall and workup showed a SAH.          Imaging Reviewed Today:  CT head  - Stable left perimesencephalic cistern and left superior frontal subarachnoid hemorrhage.-No new or worsening intracranial hemorrhage.    CT angiogram head  - -Prominent vasculature in the left perimesencephalic cistern/left tentorium. Differential includes reactive changes due to known hemorrhage versus underlying dural AV fistula. Recommend neurointerventional consultation to consider conventional angiography.    CT angiogram neck  - -No vaso-occlusive disease.    HEAD CT  - No change since 2024. Subarachnoid hemorrhage left perimesencephalic and quadrigeminal plate cisterns and left frontal cortex.    HEAD CT  - Decreasing subarachnoid hemorrhage along the left perimesencephalic and quadrigeminal plate cisterns since 2024. No change in small amount of left frontal cortical subarachnoid hemorrhage.     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 Subarachnoid hemorrhage again seen most prominent in the left   perimesencephalic region. Mild hypervascularity in this region is again seen. Please see subsequent conventional angiography report.    RIGHT KNEE XR  - The bone mineralization is normal. There is No fracture or dislocation. Degenerative narrowing of the medial joint compartment. The medial and lateral lung the skull chondrocalcinosis. Lateral joint compartment height maintained. Small suprapatellar joint effusion.. No gross soft tissue abnormalities.. IMPRESSION: Arthritic changes as described. No fracture.  ----------------------------  Patient reports he wants to go home and not to a nursing home.   He states that he has pain and has been difficulty getting up and walking.       VITALS  T(C): 36.4 (24 @ 07:34), Max: 36.8 (24 @ 00:00)  HR: 62 (24 @ 07:34) (56 - 82)  BP: 128/62 (24 @ 07:34) (120/68 - 129/68)  RR: 18 (24 @ 07:34) (17 - 18)  SpO2: 97% (24 @ 07:34) (93% - 99%)  Wt(kg): --    PAST MEDICAL & SURGICAL HISTORY       RECENT LABS REVIEWED    CBC Full  -  ( 01 May 2024 04:45 )  WBC Count : 7.39 K/uL  RBC Count : 3.74 M/uL  Hemoglobin : 10.5 g/dL  Hematocrit : 32.2 %  Platelet Count - Automated : 199 K/uL  Mean Cell Volume : 86.1 fl  Mean Cell Hemoglobin : 28.1 pg  Mean Cell Hemoglobin Concentration : 32.6 gm/dL  Auto Neutrophil # : 5.16 K/uL  Auto Lymphocyte # : 1.30 K/uL  Auto Monocyte # : 0.59 K/uL  Auto Eosinophil # : 0.28 K/uL  Auto Basophil # : 0.03 K/uL  Auto Neutrophil % : 69.8 %  Auto Lymphocyte % : 17.6 %  Auto Monocyte % : 8.0 %  Auto Eosinophil % : 3.8 %  Auto Basophil % : 0.4 %    05    138  |  102  |  23.0<H>  ----------------------------<  108<H>  3.8   |  26.0  |  0.74    Ca    8.4      01 May 2024 04:45      Urinalysis Basic - ( 01 May 2024 04:45 )    Color: x / Appearance: x / SG: x / pH: x  Gluc: 108 mg/dL / Ketone: x  / Bili: x / Urobili: x   Blood: x / Protein: x / Nitrite: x   Leuk Esterase: x / RBC: x / WBC x   Sq Epi: x / Non Sq Epi: x / Bacteria: x        ALLERGIES  clarithromycin (Swelling)      MEDICATIONS   acetaminophen     Tablet .. 975 milliGRAM(s) Oral every 8 hours  apixaban 5 milliGRAM(s) Oral every 12 hours  atorvastatin 40 milliGRAM(s) Oral at bedtime  chlorhexidine 2% Cloths 1 Application(s) Topical daily  ezetimibe 10 milliGRAM(s) Oral daily  famotidine    Tablet 20 milliGRAM(s) Oral daily  hydrALAZINE Injectable 10 milliGRAM(s) IV Push every 2 hours PRN  polyethylene glycol 3350 17 Gram(s) Oral daily  senna 2 Tablet(s) Oral at bedtime  tamsulosin 0.4 milliGRAM(s) Oral at bedtime      ----------------------------------------------------------------------------------------  FUNCTIONAL HISTORY  Lives with spouse, 0 MATT  Independent    FUNCTIONAL STATUS/PROGRESS   PT  Bed Mobility  Bed Mobility Training Supine-to-Sit: moderate assist (50% patient effort);  1 person assist  Bed Mobility Training Limitations: decreased ability to use legs for bridging/pushing;  decreased strength;  pain    Sit-Stand Transfer Training  Transfer Training Sit-to-Stand Transfer: moderate assist (50% patient effort);  1 person assist  Transfer Training Stand-to-Sit Transfer: moderate assist (50% patient effort);  1 person assist  Sit-to-Stand Transfer Training Transfer Safety Analysis: pain;  R knee    Gait Training  Gait Trainin person assist;  rolling walker;  10 feet;  moderate assist (50% patient effort)  Gait Analysis: 3-point gait   step- to gait due to pain on R when advancing LLE;  pain;  RLE;  10 feet;  rolling walker     OT  Bathing Training:     · Level of [place]	supervision; seated  · Physical Assist/Nonphysical Assist	supervision    Upper Body Dressing Training:     · Level of [place]	minimum assist (75% patients effort); to don gown  · Physical Assist/Nonphysical Assist	1 person assist    Lower Body Dressing Training:     · Level of [place]	minimum assist (75% patients effort); to don socks  · Physical Assist/Nonphysical Assist	1 person assist    Toilet Hygiene Training:     · Level of [place]	not observed    Grooming Training:     · Level of [place]	minimum assist (75% patients effort)  · Physical Assist/Nonphysical Assist	1 person assist    Eating/Self-Feeding Training:     · Level of [place]	supervision  · Physical Assist/Nonphysical Assist	supervision    ----------------------------------------------------------------------------------------  PHYSICAL EXAM  Constitutional - NAD, Comfortable  HEENT - NCAT, EOMI  Neck - Supple, No limited ROM  Chest - Breathing comfortably, No wheezing  Cardiovascular - S1S2   Abdomen - Soft   Extremities - Pitting +2 edema of the lateral aspect of right knee  Neurologic Exam -                    Cognitive - AAO to self, place, date, year, situation     Communication - Fluent, No dysarthria     Cranial Nerves - CN 2-12 intact     FUNCTIONAL MOTOR EXAM -                      LEFT    UE - ShAB 5/5, EF 5/5, EE 5/5, WE 5/5,  5/5                    RIGHT UE - ShAB 5/5, EF 5/5, EE 5/5, WE 5/5,  5/5                    LEFT    LE - HF 5/5, KE 5/5, DF 5/5, PF 5/5                    RIGHT LE - HF 3/5, KE 3/5, DF 5/5, PF 5/5        Sensory - Intact to LT  Psychiatric - Anxiouse  ----------------------------------------------------------------------------------------  ASSESSMENT/PLAN  84yMale with functional deficits after a fall sustaining a SAH  Traumatic SAH - Stable  CAD - Zetia, Lipitor  HTN, AFIB - Eliquis, Hydralazine  Right knee OA (H/O medial meniscal repair 20 years ago) - Consider Ortho consult/steroid injection, RECOMMEND Lidoderm Patches   Pain - Tylenol  DVT PPX - SCDs  Rehab/Impaired mobility and function - Patient continues to require hospitalization for the above diagnoses and ongoing active management of comorbid complications   that are substantially impairing functional ability and impairing quality of life.     RECOMMEND - OOB daily     Patient considered to not be medically safe for DC HOME with home/outpatient services given level of assist which is limited by knee pain. Does not want to go to rehab (nursing home) and discussed that my recommendation is for AR, which would assist in managing his right knee pain which is likely the result of immobility. Can consider steroid injection by ortho, if agree to assist with pain control.      When medically optimized, based on the patient's diagnosis, current functional status and potential for progress, recommend ACUTE inpatient rehabilitation for the functional deficits consisting of 3 hours of therapy/day & 24 hour RN/daily PMR physician for comorbid medical management. Patient will be able to tolerate 3 hours a day.    When medically optimized, based on the patient's diagnosis, current functional status and potential for progress, recommend VALORIE, patient DOES NOT meet acute inpatient rehabilitation criteria. Patient needs a more prolonged stay to achieve transition to community living and would not be able to tolerate a comprehensive/intense rehab program of 3hours/day.       Will continue to follow. Rehab recommendations are dependent on how functional progress changes as well as how patient continues to participate and tolerate therapeutic interventions, WHICH MAY CHANGE UPON FOLLOW UP EXAMINATIONS. Recommend ongoing mobilization by staff to maintain cardiopulmonary function and prevention of secondary complications related to debility/immobility. Discussed the specific management and recommendations above with rehab clinical care team/rehab liaison.      Total Time Spent on Encounter (reviewing clinical notes, labs, radiology, medications, patient history/exam, assessment and plan) - 75 minutes

## 2024-05-01 NOTE — PROGRESS NOTE ADULT - NS ATTEND OPT1 GEN_ALL_CORE
I attest my time as attending is greater than 50% of the total combined time spent on qualifying patient care activities by the PA/NP and attending.
I independently performed the documented:
I attest my time as attending is greater than 50% of the total combined time spent on qualifying patient care activities by the PA/NP and attending.
I independently performed the documented:

## 2024-05-01 NOTE — DISCHARGE NOTE PROVIDER - CARE PROVIDERS DIRECT ADDRESSES
,[email or web address]

## 2024-05-01 NOTE — DISCHARGE NOTE NURSING/CASE MANAGEMENT/SOCIAL WORK - PATIENT PORTAL LINK FT
You can access the FollowMyHealth Patient Portal offered by [facility] by registering at the following website: [email or web address]. By joining [employer]’s FollowMyHealth portal, you will also be able to view your health information using other applications (apps) compatible with our system.

## 2024-05-01 NOTE — DISCHARGE NOTE PROVIDER - PROVIDER TOKENS
PROVIDER:[TOKEN:[[id number]],FOLLOWUP:[1 week]],PROVIDER:[TOKEN:[[id number]],FOLLOWUP:[2 weeks]]

## 2024-05-01 NOTE — DISCHARGE NOTE PROVIDER - HOSPITAL COURSE
83 y/o M w/ PMH of gerd, HTN, HLD, CAD s/p PCI (not on DAPT despite it being prescribed to him) initially presented to [facility] after syncopal episode w/ subsequent head trauma.  w/u at [facility] was significant for SAH and was transferred to [facility] neuro ICU on 04/23/24.  On initial evaluation on arrival pt was c/o double vision but denied HA.  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  CTA h/n showed prominent vasculature in the left perimesencephalic cistern/left tentorium of which differential included reactive changes due to known hemorrhage vs underlying dural AV fistula.  NeuroIR evaluated and pt underwent cerebral angiography on 04/24 and no dural AV fistula was found.  Hospital course complicated by new AFlutter.  EP was consulted and planning for possible cardioversion but after cardioversion pt would need to be on at least 1 month of uninterrupted AC. Repeat CT head on 4/29 with decreasing subarachnoid hemorrhage along the left perimesencephalic and quadrigeminal plate cisterns since 4/24/2024. No change in small amount of left frontal cortical subarachnoid hemorrhage. Eliquis was restarted on 4/30 and tolerating. The patient is hemodynamically stable for discharge with appropriate follow up.

## 2024-05-01 NOTE — DISCHARGE NOTE PROVIDER - ATTENDING DISCHARGE PHYSICAL EXAMINATION:
Vital Signs Last 24 Hrs  T(F): 98.1 (01 May 2024 12:56), Max: 98.3 (01 May 2024 00:00)  HR: 67 (01 May 2024 12:56) (56 - 82)  BP: 148/92 (01 May 2024 12:56) (120/68 - 148/92)  RR: 18 (01 May 2024 12:56) (17 - 18)  SpO2: 98% (01 May 2024 12:56) (93% - 99%)    Physical Exam:  Constitutional: alert and oriented, in no acute distress   Neck: Soft and supple, No LAD, No JVD  Respiratory: Clear to auscultation bilaterally, no wheezes or crackles  Cardiovascular: Regular rate and rhythm, no murmurs, gallops, rubs  Gastrointestinal: Soft, non-tender to palpation, +bs  Vascular: 2+ peripheral pulses  Neurological: A/O x 3  Musculoskeletal: 5/5 strength b/l upper and lower extremities, no lower extremity edema bilaterally

## 2024-06-07 ENCOUNTER — APPOINTMENT (OUTPATIENT)
Dept: OPHTHALMOLOGY | Facility: CLINIC | Age: 84
End: 2024-06-07

## 2024-07-12 ENCOUNTER — NON-APPOINTMENT (OUTPATIENT)
Age: 84
End: 2024-07-12

## 2024-07-12 ENCOUNTER — APPOINTMENT (OUTPATIENT)
Dept: OPHTHALMOLOGY | Facility: CLINIC | Age: 84
End: 2024-07-12
Payer: MEDICARE

## 2024-07-12 PROCEDURE — 92014 COMPRE OPH EXAM EST PT 1/>: CPT

## 2024-08-02 ENCOUNTER — APPOINTMENT (OUTPATIENT)
Dept: OPHTHALMOLOGY | Facility: CLINIC | Age: 84
End: 2024-08-02
Payer: SELF-PAY

## 2024-08-02 ENCOUNTER — APPOINTMENT (OUTPATIENT)
Dept: OPHTHALMOLOGY | Facility: CLINIC | Age: 84
End: 2024-08-02
Payer: MEDICARE

## 2024-08-02 ENCOUNTER — NON-APPOINTMENT (OUTPATIENT)
Age: 84
End: 2024-08-02

## 2024-08-02 PROCEDURE — 92060 SENSORIMOTOR EXAMINATION: CPT

## 2024-08-02 PROCEDURE — 92015 DETERMINE REFRACTIVE STATE: CPT

## 2024-09-06 ENCOUNTER — APPOINTMENT (OUTPATIENT)
Dept: OPHTHALMOLOGY | Facility: CLINIC | Age: 84
End: 2024-09-06

## 2024-10-10 ENCOUNTER — NON-APPOINTMENT (OUTPATIENT)
Age: 84
End: 2024-10-10

## 2024-10-10 ENCOUNTER — APPOINTMENT (OUTPATIENT)
Dept: OPHTHALMOLOGY | Facility: CLINIC | Age: 84
End: 2024-10-10
Payer: MEDICARE

## 2024-10-10 PROCEDURE — 92060 SENSORIMOTOR EXAMINATION: CPT

## 2025-01-30 ENCOUNTER — NON-APPOINTMENT (OUTPATIENT)
Age: 85
End: 2025-01-30

## 2025-01-30 ENCOUNTER — APPOINTMENT (OUTPATIENT)
Dept: OPHTHALMOLOGY | Facility: CLINIC | Age: 85
End: 2025-01-30
Payer: MEDICARE

## 2025-01-30 PROCEDURE — 92134 CPTRZ OPH DX IMG PST SGM RTA: CPT

## 2025-01-30 PROCEDURE — 92014 COMPRE OPH EXAM EST PT 1/>: CPT

## 2025-02-17 ENCOUNTER — NON-APPOINTMENT (OUTPATIENT)
Age: 85
End: 2025-02-17

## 2025-02-17 ENCOUNTER — APPOINTMENT (OUTPATIENT)
Dept: OPHTHALMOLOGY | Facility: CLINIC | Age: 85
End: 2025-02-17
Payer: MEDICARE

## 2025-02-17 PROCEDURE — 99213 OFFICE O/P EST LOW 20 MIN: CPT

## 2025-02-17 PROCEDURE — 92136 OPHTHALMIC BIOMETRY: CPT

## 2025-03-12 ENCOUNTER — APPOINTMENT (OUTPATIENT)
Dept: OPHTHALMOLOGY | Facility: AMBULATORY MEDICAL SERVICES | Age: 85
End: 2025-03-12
Payer: MEDICARE

## 2025-03-12 ENCOUNTER — NON-APPOINTMENT (OUTPATIENT)
Age: 85
End: 2025-03-12

## 2025-03-12 PROCEDURE — 66982 XCAPSL CTRC RMVL CPLX WO ECP: CPT | Mod: LT

## 2025-03-13 ENCOUNTER — APPOINTMENT (OUTPATIENT)
Dept: OPHTHALMOLOGY | Facility: CLINIC | Age: 85
End: 2025-03-13

## 2025-03-14 ENCOUNTER — APPOINTMENT (OUTPATIENT)
Dept: OPHTHALMOLOGY | Facility: CLINIC | Age: 85
End: 2025-03-14
Payer: MEDICARE

## 2025-03-14 ENCOUNTER — NON-APPOINTMENT (OUTPATIENT)
Age: 85
End: 2025-03-14

## 2025-03-14 PROCEDURE — 99024 POSTOP FOLLOW-UP VISIT: CPT

## 2025-03-20 ENCOUNTER — APPOINTMENT (OUTPATIENT)
Dept: OPHTHALMOLOGY | Facility: CLINIC | Age: 85
End: 2025-03-20
Payer: MEDICARE

## 2025-03-20 ENCOUNTER — NON-APPOINTMENT (OUTPATIENT)
Age: 85
End: 2025-03-20

## 2025-03-20 PROCEDURE — 99024 POSTOP FOLLOW-UP VISIT: CPT

## 2025-04-17 ENCOUNTER — NON-APPOINTMENT (OUTPATIENT)
Age: 85
End: 2025-04-17

## 2025-04-17 ENCOUNTER — APPOINTMENT (OUTPATIENT)
Dept: OPHTHALMOLOGY | Facility: CLINIC | Age: 85
End: 2025-04-17
Payer: MEDICARE

## 2025-04-17 PROCEDURE — 99024 POSTOP FOLLOW-UP VISIT: CPT

## 2025-06-05 ENCOUNTER — NON-APPOINTMENT (OUTPATIENT)
Age: 85
End: 2025-06-05

## 2025-06-05 ENCOUNTER — APPOINTMENT (OUTPATIENT)
Dept: OPHTHALMOLOGY | Facility: CLINIC | Age: 85
End: 2025-06-05
Payer: MEDICARE

## 2025-06-05 PROCEDURE — 68761 CLOSE TEAR DUCT OPENING: CPT | Mod: 79,LT

## 2025-06-05 PROCEDURE — 99024 POSTOP FOLLOW-UP VISIT: CPT

## 2025-08-22 ENCOUNTER — NON-APPOINTMENT (OUTPATIENT)
Age: 85
End: 2025-08-22

## 2025-08-22 ENCOUNTER — APPOINTMENT (OUTPATIENT)
Dept: OPHTHALMOLOGY | Facility: CLINIC | Age: 85
End: 2025-08-22
Payer: MEDICARE

## 2025-08-22 PROCEDURE — 92012 INTRM OPH EXAM EST PATIENT: CPT
